# Patient Record
Sex: FEMALE | Race: OTHER | HISPANIC OR LATINO
[De-identification: names, ages, dates, MRNs, and addresses within clinical notes are randomized per-mention and may not be internally consistent; named-entity substitution may affect disease eponyms.]

---

## 2017-01-06 ENCOUNTER — APPOINTMENT (OUTPATIENT)
Dept: INTERNAL MEDICINE | Facility: CLINIC | Age: 72
End: 2017-01-06

## 2017-01-06 ENCOUNTER — OUTPATIENT (OUTPATIENT)
Dept: OUTPATIENT SERVICES | Facility: HOSPITAL | Age: 72
LOS: 1 days | End: 2017-01-06
Payer: MEDICAID

## 2017-01-06 VITALS
SYSTOLIC BLOOD PRESSURE: 120 MMHG | DIASTOLIC BLOOD PRESSURE: 70 MMHG | HEIGHT: 64 IN | WEIGHT: 133 LBS | BODY MASS INDEX: 22.71 KG/M2

## 2017-01-06 DIAGNOSIS — Z98.89 OTHER SPECIFIED POSTPROCEDURAL STATES: Chronic | ICD-10-CM

## 2017-01-06 DIAGNOSIS — I10 ESSENTIAL (PRIMARY) HYPERTENSION: ICD-10-CM

## 2017-01-06 DIAGNOSIS — Z41.1 ENCOUNTER FOR COSMETIC SURGERY: Chronic | ICD-10-CM

## 2017-01-06 DIAGNOSIS — Z98.42 CATARACT EXTRACTION STATUS, LEFT EYE: Chronic | ICD-10-CM

## 2017-01-06 DIAGNOSIS — R04.0 EPISTAXIS: ICD-10-CM

## 2017-01-06 PROCEDURE — G0463: CPT

## 2017-01-09 DIAGNOSIS — M25.561 PAIN IN RIGHT KNEE: ICD-10-CM

## 2017-01-09 DIAGNOSIS — Z87.39 PERSONAL HISTORY OF OTHER DISEASES OF THE MUSCULOSKELETAL SYSTEM AND CONNECTIVE TISSUE: ICD-10-CM

## 2017-01-09 DIAGNOSIS — L57.0 ACTINIC KERATOSIS: ICD-10-CM

## 2017-01-26 ENCOUNTER — FORM ENCOUNTER (OUTPATIENT)
Age: 72
End: 2017-01-26

## 2017-01-27 ENCOUNTER — APPOINTMENT (OUTPATIENT)
Dept: MAMMOGRAPHY | Facility: IMAGING CENTER | Age: 72
End: 2017-01-27

## 2017-01-27 ENCOUNTER — OUTPATIENT (OUTPATIENT)
Dept: OUTPATIENT SERVICES | Facility: HOSPITAL | Age: 72
LOS: 1 days | End: 2017-01-27
Payer: MEDICAID

## 2017-01-27 DIAGNOSIS — Z41.1 ENCOUNTER FOR COSMETIC SURGERY: Chronic | ICD-10-CM

## 2017-01-27 DIAGNOSIS — Z98.89 OTHER SPECIFIED POSTPROCEDURAL STATES: Chronic | ICD-10-CM

## 2017-01-27 DIAGNOSIS — Z12.31 ENCOUNTER FOR SCREENING MAMMOGRAM FOR MALIGNANT NEOPLASM OF BREAST: ICD-10-CM

## 2017-01-27 DIAGNOSIS — Z98.42 CATARACT EXTRACTION STATUS, LEFT EYE: Chronic | ICD-10-CM

## 2017-01-27 PROCEDURE — 77063 BREAST TOMOSYNTHESIS BI: CPT

## 2017-01-27 PROCEDURE — 77067 SCR MAMMO BI INCL CAD: CPT

## 2017-02-03 ENCOUNTER — OUTPATIENT (OUTPATIENT)
Dept: OUTPATIENT SERVICES | Facility: HOSPITAL | Age: 72
LOS: 1 days | End: 2017-02-03
Payer: MEDICAID

## 2017-02-03 ENCOUNTER — APPOINTMENT (OUTPATIENT)
Dept: RHEUMATOLOGY | Facility: HOSPITAL | Age: 72
End: 2017-02-03

## 2017-02-03 VITALS
RESPIRATION RATE: 16 BRPM | HEART RATE: 72 BPM | DIASTOLIC BLOOD PRESSURE: 74 MMHG | HEIGHT: 64 IN | WEIGHT: 129 LBS | SYSTOLIC BLOOD PRESSURE: 147 MMHG | BODY MASS INDEX: 22.02 KG/M2

## 2017-02-03 DIAGNOSIS — Z98.89 OTHER SPECIFIED POSTPROCEDURAL STATES: Chronic | ICD-10-CM

## 2017-02-03 DIAGNOSIS — M06.9 RHEUMATOID ARTHRITIS, UNSPECIFIED: ICD-10-CM

## 2017-02-03 DIAGNOSIS — Z98.42 CATARACT EXTRACTION STATUS, LEFT EYE: Chronic | ICD-10-CM

## 2017-02-03 DIAGNOSIS — Z41.1 ENCOUNTER FOR COSMETIC SURGERY: Chronic | ICD-10-CM

## 2017-02-03 PROCEDURE — G0463: CPT

## 2017-02-03 PROCEDURE — 20610 DRAIN/INJ JOINT/BURSA W/O US: CPT

## 2017-02-03 RX ORDER — LIDOCAINE HYDROCHLORIDE 10 MG/ML
1 INJECTION, SOLUTION INFILTRATION; PERINEURAL
Qty: 0 | Refills: 0 | Status: COMPLETED | OUTPATIENT
Start: 2017-02-03

## 2017-02-03 RX ORDER — METHYLPREDNISOLONE ACETATE 40 MG/ML
40 INJECTION, SUSPENSION INTRA-ARTICULAR; INTRALESIONAL; INTRAMUSCULAR; SOFT TISSUE
Qty: 1 | Refills: 0 | Status: COMPLETED | OUTPATIENT
Start: 2017-02-03

## 2017-02-09 ENCOUNTER — APPOINTMENT (OUTPATIENT)
Dept: OPHTHALMOLOGY | Facility: CLINIC | Age: 72
End: 2017-02-09

## 2017-02-10 DIAGNOSIS — M81.0 AGE-RELATED OSTEOPOROSIS WITHOUT CURRENT PATHOLOGICAL FRACTURE: ICD-10-CM

## 2017-02-10 DIAGNOSIS — M19.90 UNSPECIFIED OSTEOARTHRITIS, UNSPECIFIED SITE: ICD-10-CM

## 2017-02-10 DIAGNOSIS — M11.869 OTHER SPECIFIED CRYSTAL ARTHROPATHIES, UNSPECIFIED KNEE: ICD-10-CM

## 2017-02-13 ENCOUNTER — APPOINTMENT (OUTPATIENT)
Dept: OPHTHALMOLOGY | Facility: CLINIC | Age: 72
End: 2017-02-13

## 2017-04-16 ENCOUNTER — RESULT CHARGE (OUTPATIENT)
Age: 72
End: 2017-04-16

## 2017-04-17 ENCOUNTER — OUTPATIENT (OUTPATIENT)
Dept: OUTPATIENT SERVICES | Facility: HOSPITAL | Age: 72
LOS: 1 days | End: 2017-04-17
Payer: MEDICAID

## 2017-04-17 ENCOUNTER — NON-APPOINTMENT (OUTPATIENT)
Age: 72
End: 2017-04-17

## 2017-04-17 ENCOUNTER — APPOINTMENT (OUTPATIENT)
Dept: INTERNAL MEDICINE | Facility: CLINIC | Age: 72
End: 2017-04-17

## 2017-04-17 ENCOUNTER — LABORATORY RESULT (OUTPATIENT)
Age: 72
End: 2017-04-17

## 2017-04-17 DIAGNOSIS — Z98.89 OTHER SPECIFIED POSTPROCEDURAL STATES: Chronic | ICD-10-CM

## 2017-04-17 DIAGNOSIS — Z41.1 ENCOUNTER FOR COSMETIC SURGERY: Chronic | ICD-10-CM

## 2017-04-17 DIAGNOSIS — I10 ESSENTIAL (PRIMARY) HYPERTENSION: ICD-10-CM

## 2017-04-17 DIAGNOSIS — Z13.1 ENCOUNTER FOR SCREENING FOR DIABETES MELLITUS: ICD-10-CM

## 2017-04-17 DIAGNOSIS — Z98.42 CATARACT EXTRACTION STATUS, LEFT EYE: Chronic | ICD-10-CM

## 2017-04-17 PROCEDURE — 80061 LIPID PANEL: CPT

## 2017-04-17 PROCEDURE — G0463: CPT

## 2017-04-18 LAB
CHOLEST SERPL-MCNC: 140 MG/DL — SIGNIFICANT CHANGE UP (ref 10–199)
HDLC SERPL-MCNC: 56 MG/DL — SIGNIFICANT CHANGE UP (ref 40–125)
LIPID PNL WITH DIRECT LDL SERPL: 72 MG/DL — SIGNIFICANT CHANGE UP
TOTAL CHOLESTEROL/HDL RATIO MEASUREMENT: 2.5 RATIO — LOW (ref 3.3–7.1)
TRIGL SERPL-MCNC: 62 MG/DL — SIGNIFICANT CHANGE UP (ref 10–149)

## 2017-04-21 LAB
ANION GAP SERPL CALC-SCNC: 16 MMOL/L
BASOPHILS # BLD AUTO: 0.02 K/UL
BASOPHILS NFR BLD AUTO: 0.3 %
BUN SERPL-MCNC: 19 MG/DL
CALCIUM SERPL-MCNC: 10 MG/DL
CHLORIDE SERPL-SCNC: 98 MMOL/L
CO2 SERPL-SCNC: 25 MMOL/L
CREAT SERPL-MCNC: 0.73 MG/DL
EOSINOPHIL # BLD AUTO: 0.07 K/UL
EOSINOPHIL NFR BLD AUTO: 1.1 %
GLUCOSE SERPL-MCNC: 91 MG/DL
HBA1C MFR BLD HPLC: 5.8 %
HCT VFR BLD CALC: 41.5 %
HGB BLD-MCNC: 14 G/DL
IMM GRANULOCYTES NFR BLD AUTO: 0.2 %
LYMPHOCYTES # BLD AUTO: 2.09 K/UL
LYMPHOCYTES NFR BLD AUTO: 33.9 %
MAN DIFF?: NORMAL
MCHC RBC-ENTMCNC: 31.3 PG
MCHC RBC-ENTMCNC: 33.7 GM/DL
MCV RBC AUTO: 92.8 FL
MONOCYTES # BLD AUTO: 0.41 K/UL
MONOCYTES NFR BLD AUTO: 6.6 %
NEUTROPHILS # BLD AUTO: 3.57 K/UL
NEUTROPHILS NFR BLD AUTO: 57.9 %
PLATELET # BLD AUTO: 258 K/UL
POTASSIUM SERPL-SCNC: 3.6 MMOL/L
RBC # BLD: 4.47 M/UL
RBC # FLD: 13.6 %
SODIUM SERPL-SCNC: 139 MMOL/L
TSH SERPL-ACNC: 3.59 UIU/ML
WBC # FLD AUTO: 6.17 K/UL

## 2017-04-28 ENCOUNTER — APPOINTMENT (OUTPATIENT)
Dept: RHEUMATOLOGY | Facility: HOSPITAL | Age: 72
End: 2017-04-28

## 2017-05-01 ENCOUNTER — NON-APPOINTMENT (OUTPATIENT)
Age: 72
End: 2017-05-01

## 2017-05-02 ENCOUNTER — APPOINTMENT (OUTPATIENT)
Dept: INTERNAL MEDICINE | Facility: CLINIC | Age: 72
End: 2017-05-02

## 2017-05-02 DIAGNOSIS — L57.0 ACTINIC KERATOSIS: ICD-10-CM

## 2017-05-02 DIAGNOSIS — R10.9 UNSPECIFIED ABDOMINAL PAIN: ICD-10-CM

## 2017-05-16 ENCOUNTER — APPOINTMENT (OUTPATIENT)
Dept: INTERNAL MEDICINE | Facility: CLINIC | Age: 72
End: 2017-05-16

## 2017-05-16 ENCOUNTER — OUTPATIENT (OUTPATIENT)
Dept: OUTPATIENT SERVICES | Facility: HOSPITAL | Age: 72
LOS: 1 days | End: 2017-05-16
Payer: MEDICAID

## 2017-05-16 DIAGNOSIS — Z41.1 ENCOUNTER FOR COSMETIC SURGERY: Chronic | ICD-10-CM

## 2017-05-16 DIAGNOSIS — Z98.42 CATARACT EXTRACTION STATUS, LEFT EYE: Chronic | ICD-10-CM

## 2017-05-16 DIAGNOSIS — I10 ESSENTIAL (PRIMARY) HYPERTENSION: ICD-10-CM

## 2017-05-16 DIAGNOSIS — Z98.89 OTHER SPECIFIED POSTPROCEDURAL STATES: Chronic | ICD-10-CM

## 2017-05-16 PROCEDURE — G0463: CPT

## 2017-05-26 ENCOUNTER — APPOINTMENT (OUTPATIENT)
Dept: RHEUMATOLOGY | Facility: HOSPITAL | Age: 72
End: 2017-05-26

## 2017-05-26 ENCOUNTER — OUTPATIENT (OUTPATIENT)
Dept: OUTPATIENT SERVICES | Facility: HOSPITAL | Age: 72
LOS: 1 days | End: 2017-05-26
Payer: MEDICAID

## 2017-05-26 VITALS
BODY MASS INDEX: 22.2 KG/M2 | WEIGHT: 130 LBS | DIASTOLIC BLOOD PRESSURE: 72 MMHG | SYSTOLIC BLOOD PRESSURE: 136 MMHG | HEART RATE: 74 BPM | HEIGHT: 64 IN

## 2017-05-26 DIAGNOSIS — Z98.89 OTHER SPECIFIED POSTPROCEDURAL STATES: Chronic | ICD-10-CM

## 2017-05-26 DIAGNOSIS — M06.9 RHEUMATOID ARTHRITIS, UNSPECIFIED: ICD-10-CM

## 2017-05-26 DIAGNOSIS — M10.9 GOUT, UNSPECIFIED: ICD-10-CM

## 2017-05-26 DIAGNOSIS — Z98.42 CATARACT EXTRACTION STATUS, LEFT EYE: Chronic | ICD-10-CM

## 2017-05-26 DIAGNOSIS — Z41.1 ENCOUNTER FOR COSMETIC SURGERY: Chronic | ICD-10-CM

## 2017-05-26 LAB
ALBUMIN SERPL ELPH-MCNC: 4.5 G/DL — SIGNIFICANT CHANGE UP (ref 3.3–5)
ALP SERPL-CCNC: 79 U/L — SIGNIFICANT CHANGE UP (ref 40–120)
ALT FLD-CCNC: 29 U/L — SIGNIFICANT CHANGE UP (ref 10–45)
ANION GAP SERPL CALC-SCNC: 17 MMOL/L — SIGNIFICANT CHANGE UP (ref 5–17)
AST SERPL-CCNC: 33 U/L — SIGNIFICANT CHANGE UP (ref 10–40)
B PERT IGG+IGM PNL SER: CLEAR — SIGNIFICANT CHANGE UP
BASOPHILS # BLD AUTO: 0.07 K/UL — SIGNIFICANT CHANGE UP (ref 0–0.2)
BASOPHILS NFR BLD AUTO: 1.2 % — SIGNIFICANT CHANGE UP (ref 0–2)
BILIRUB SERPL-MCNC: 0.5 MG/DL — SIGNIFICANT CHANGE UP (ref 0.2–1.2)
BUN SERPL-MCNC: 18 MG/DL — SIGNIFICANT CHANGE UP (ref 7–23)
CALCIUM SERPL-MCNC: 10.2 MG/DL — SIGNIFICANT CHANGE UP (ref 8.4–10.5)
CHLORIDE SERPL-SCNC: 99 MMOL/L — SIGNIFICANT CHANGE UP (ref 96–108)
CO2 SERPL-SCNC: 25 MMOL/L — SIGNIFICANT CHANGE UP (ref 22–31)
COLOR FLD: SIGNIFICANT CHANGE UP
CREAT SERPL-MCNC: 0.8 MG/DL — SIGNIFICANT CHANGE UP (ref 0.5–1.3)
CRP SERPL-MCNC: <0.2 MG/DL — SIGNIFICANT CHANGE UP (ref 0–0.4)
CRYSTALS SNV QL MICRO: SIGNIFICANT CHANGE UP
EOSINOPHIL # BLD AUTO: 0.1 K/UL — SIGNIFICANT CHANGE UP (ref 0–0.5)
EOSINOPHIL NFR BLD AUTO: 1.7 % — SIGNIFICANT CHANGE UP (ref 0–6)
ERYTHROCYTE [SEDIMENTATION RATE] IN BLOOD: 23 MM/HR — HIGH (ref 0–20)
FLUID INTAKE SUBSTANCE CLASS: SIGNIFICANT CHANGE UP
FLUID SEGMENTED GRANULOCYTES: 3 % — SIGNIFICANT CHANGE UP
GLUCOSE SERPL-MCNC: 97 MG/DL — SIGNIFICANT CHANGE UP (ref 70–99)
GRAM STN FLD: SIGNIFICANT CHANGE UP
HCT VFR BLD CALC: 41.8 % — SIGNIFICANT CHANGE UP (ref 34.5–45)
HGB BLD-MCNC: 13.9 G/DL — SIGNIFICANT CHANGE UP (ref 11.5–15.5)
IMM GRANULOCYTES NFR BLD AUTO: 0.2 % — SIGNIFICANT CHANGE UP (ref 0–1.5)
LYMPHOCYTES # BLD AUTO: 1.79 K/UL — SIGNIFICANT CHANGE UP (ref 1–3.3)
LYMPHOCYTES # BLD AUTO: 30.1 % — SIGNIFICANT CHANGE UP (ref 13–44)
LYMPHOCYTES # FLD: 14 % — SIGNIFICANT CHANGE UP
MCHC RBC-ENTMCNC: 30.5 PG — SIGNIFICANT CHANGE UP (ref 27–34)
MCHC RBC-ENTMCNC: 33.3 GM/DL — SIGNIFICANT CHANGE UP (ref 32–36)
MCV RBC AUTO: 91.9 FL — SIGNIFICANT CHANGE UP (ref 80–100)
MESOTHL CELL # FLD: 10 % — SIGNIFICANT CHANGE UP
MONOCYTES # BLD AUTO: 0.5 K/UL — SIGNIFICANT CHANGE UP (ref 0–0.9)
MONOCYTES NFR BLD AUTO: 8.4 % — SIGNIFICANT CHANGE UP (ref 2–14)
MONOS+MACROS # FLD: 73 % — SIGNIFICANT CHANGE UP
NEUTROPHILS # BLD AUTO: 3.47 K/UL — SIGNIFICANT CHANGE UP (ref 1.8–7.4)
NEUTROPHILS NFR BLD AUTO: 58.4 % — SIGNIFICANT CHANGE UP (ref 43–77)
PLATELET # BLD AUTO: 308 K/UL — SIGNIFICANT CHANGE UP (ref 150–400)
POTASSIUM SERPL-MCNC: 4.1 MMOL/L — SIGNIFICANT CHANGE UP (ref 3.5–5.3)
POTASSIUM SERPL-SCNC: 4.1 MMOL/L — SIGNIFICANT CHANGE UP (ref 3.5–5.3)
PROT SERPL-MCNC: 7.6 G/DL — SIGNIFICANT CHANGE UP (ref 6–8.3)
RBC # BLD: 4.55 M/UL — SIGNIFICANT CHANGE UP (ref 3.8–5.2)
RBC # FLD: 13.4 % — SIGNIFICANT CHANGE UP (ref 10.3–14.5)
RCV VOL RI: 31 /UL — HIGH (ref 0–5)
SODIUM SERPL-SCNC: 141 MMOL/L — SIGNIFICANT CHANGE UP (ref 135–145)
SPECIMEN SOURCE: SIGNIFICANT CHANGE UP
TOTAL NUCLEATED CELL COUNT, BODY FLUID: 73 /UL — HIGH (ref 0–5)
TUBE TYPE: SIGNIFICANT CHANGE UP
URATE SERPL-MCNC: 3.9 MG/DL — SIGNIFICANT CHANGE UP (ref 2.5–7)
WBC # BLD: 5.94 K/UL — SIGNIFICANT CHANGE UP (ref 3.8–10.5)
WBC # FLD AUTO: 5.94 K/UL — SIGNIFICANT CHANGE UP (ref 3.8–10.5)

## 2017-05-26 PROCEDURE — G0463: CPT

## 2017-05-26 PROCEDURE — 85027 COMPLETE CBC AUTOMATED: CPT

## 2017-05-26 PROCEDURE — 85652 RBC SED RATE AUTOMATED: CPT

## 2017-05-26 PROCEDURE — 86140 C-REACTIVE PROTEIN: CPT

## 2017-05-26 PROCEDURE — 89060 EXAM SYNOVIAL FLUID CRYSTALS: CPT

## 2017-05-26 PROCEDURE — 87205 SMEAR GRAM STAIN: CPT

## 2017-05-26 PROCEDURE — 36415 COLL VENOUS BLD VENIPUNCTURE: CPT

## 2017-05-26 PROCEDURE — 87075 CULTR BACTERIA EXCEPT BLOOD: CPT

## 2017-05-26 PROCEDURE — 89051 BODY FLUID CELL COUNT: CPT

## 2017-05-26 PROCEDURE — 84550 ASSAY OF BLOOD/URIC ACID: CPT

## 2017-05-26 PROCEDURE — 87070 CULTURE OTHR SPECIMN AEROBIC: CPT

## 2017-05-26 PROCEDURE — 80053 COMPREHEN METABOLIC PANEL: CPT

## 2017-05-31 ENCOUNTER — APPOINTMENT (OUTPATIENT)
Dept: ELECTROPHYSIOLOGY | Facility: CLINIC | Age: 72
End: 2017-05-31

## 2017-05-31 ENCOUNTER — NON-APPOINTMENT (OUTPATIENT)
Age: 72
End: 2017-05-31

## 2017-05-31 VITALS — OXYGEN SATURATION: 98 % | SYSTOLIC BLOOD PRESSURE: 149 MMHG | HEART RATE: 62 BPM | DIASTOLIC BLOOD PRESSURE: 85 MMHG

## 2017-05-31 LAB
CULTURE RESULTS: SIGNIFICANT CHANGE UP
SPECIMEN SOURCE: SIGNIFICANT CHANGE UP

## 2017-06-02 DIAGNOSIS — M19.90 UNSPECIFIED OSTEOARTHRITIS, UNSPECIFIED SITE: ICD-10-CM

## 2017-06-02 DIAGNOSIS — M11.869 OTHER SPECIFIED CRYSTAL ARTHROPATHIES, UNSPECIFIED KNEE: ICD-10-CM

## 2017-06-21 ENCOUNTER — APPOINTMENT (OUTPATIENT)
Dept: ELECTROPHYSIOLOGY | Facility: CLINIC | Age: 72
End: 2017-06-21

## 2017-06-21 ENCOUNTER — NON-APPOINTMENT (OUTPATIENT)
Age: 72
End: 2017-06-21

## 2017-06-21 VITALS — SYSTOLIC BLOOD PRESSURE: 136 MMHG | DIASTOLIC BLOOD PRESSURE: 71 MMHG | HEART RATE: 62 BPM

## 2017-06-21 DIAGNOSIS — R00.2 PALPITATIONS: ICD-10-CM

## 2017-06-21 DIAGNOSIS — H26.9 UNSPECIFIED CATARACT: ICD-10-CM

## 2017-06-21 RX ORDER — SODIUM CHLORIDE 0.65 %
0.65 AEROSOL, SPRAY (ML) NASAL TWICE DAILY
Qty: 1 | Refills: 0 | Status: DISCONTINUED | COMMUNITY
Start: 2017-01-06 | End: 2017-06-21

## 2017-07-02 ENCOUNTER — FORM ENCOUNTER (OUTPATIENT)
Age: 72
End: 2017-07-02

## 2017-07-21 ENCOUNTER — OUTPATIENT (OUTPATIENT)
Dept: OUTPATIENT SERVICES | Facility: HOSPITAL | Age: 72
LOS: 1 days | End: 2017-07-21
Payer: MEDICAID

## 2017-07-21 ENCOUNTER — APPOINTMENT (OUTPATIENT)
Dept: RHEUMATOLOGY | Facility: HOSPITAL | Age: 72
End: 2017-07-21

## 2017-07-21 VITALS
BODY MASS INDEX: 22.36 KG/M2 | HEIGHT: 64 IN | HEART RATE: 76 BPM | SYSTOLIC BLOOD PRESSURE: 122 MMHG | WEIGHT: 131 LBS | DIASTOLIC BLOOD PRESSURE: 73 MMHG | RESPIRATION RATE: 14 BRPM

## 2017-07-21 DIAGNOSIS — Z98.42 CATARACT EXTRACTION STATUS, LEFT EYE: Chronic | ICD-10-CM

## 2017-07-21 DIAGNOSIS — M06.9 RHEUMATOID ARTHRITIS, UNSPECIFIED: ICD-10-CM

## 2017-07-21 DIAGNOSIS — M11.269 OTHER CHONDROCALCINOSIS, UNSPECIFIED KNEE: ICD-10-CM

## 2017-07-21 DIAGNOSIS — Z41.1 ENCOUNTER FOR COSMETIC SURGERY: Chronic | ICD-10-CM

## 2017-07-21 DIAGNOSIS — Z98.89 OTHER SPECIFIED POSTPROCEDURAL STATES: Chronic | ICD-10-CM

## 2017-07-21 LAB
B PERT IGG+IGM PNL SER: ABNORMAL
COLOR FLD: YELLOW — SIGNIFICANT CHANGE UP
CRYSTALS SNV QL MICRO: SIGNIFICANT CHANGE UP
FLUID INTAKE SUBSTANCE CLASS: SIGNIFICANT CHANGE UP
FLUID SEGMENTED GRANULOCYTES: 17 % — SIGNIFICANT CHANGE UP
FOLATE+VIT B12 SERBLD-IMP: 3 % — SIGNIFICANT CHANGE UP
LYMPHOCYTES # FLD: 29 % — SIGNIFICANT CHANGE UP
MESOTHL CELL # FLD: 11 % — SIGNIFICANT CHANGE UP
MONOS+MACROS # FLD: 40 % — SIGNIFICANT CHANGE UP
RCV VOL RI: 2050 /UL — HIGH (ref 0–5)
TOTAL NUCLEATED CELL COUNT, BODY FLUID: 240 /UL — HIGH (ref 0–5)
TUBE TYPE: SIGNIFICANT CHANGE UP

## 2017-07-21 PROCEDURE — 89060 EXAM SYNOVIAL FLUID CRYSTALS: CPT

## 2017-07-21 PROCEDURE — 89051 BODY FLUID CELL COUNT: CPT

## 2017-07-21 PROCEDURE — 20610 DRAIN/INJ JOINT/BURSA W/O US: CPT

## 2017-07-21 PROCEDURE — G0463: CPT

## 2017-07-24 DIAGNOSIS — M19.90 UNSPECIFIED OSTEOARTHRITIS, UNSPECIFIED SITE: ICD-10-CM

## 2017-07-26 ENCOUNTER — RX RENEWAL (OUTPATIENT)
Age: 72
End: 2017-07-26

## 2017-07-27 ENCOUNTER — FORM ENCOUNTER (OUTPATIENT)
Age: 72
End: 2017-07-27

## 2017-07-28 ENCOUNTER — OUTPATIENT (OUTPATIENT)
Dept: OUTPATIENT SERVICES | Facility: HOSPITAL | Age: 72
LOS: 1 days | End: 2017-07-28
Payer: MEDICAID

## 2017-07-28 DIAGNOSIS — M11.269 OTHER CHONDROCALCINOSIS, UNSPECIFIED KNEE: ICD-10-CM

## 2017-07-28 DIAGNOSIS — M25.569 PAIN IN UNSPECIFIED KNEE: ICD-10-CM

## 2017-07-28 DIAGNOSIS — Z98.89 OTHER SPECIFIED POSTPROCEDURAL STATES: Chronic | ICD-10-CM

## 2017-07-28 DIAGNOSIS — M19.90 UNSPECIFIED OSTEOARTHRITIS, UNSPECIFIED SITE: ICD-10-CM

## 2017-07-28 DIAGNOSIS — Z41.1 ENCOUNTER FOR COSMETIC SURGERY: Chronic | ICD-10-CM

## 2017-07-28 DIAGNOSIS — Z98.42 CATARACT EXTRACTION STATUS, LEFT EYE: Chronic | ICD-10-CM

## 2017-07-28 PROCEDURE — 73564 X-RAY EXAM KNEE 4 OR MORE: CPT

## 2017-07-28 PROCEDURE — 73521 X-RAY EXAM HIPS BI 2 VIEWS: CPT | Mod: 26

## 2017-07-28 PROCEDURE — 73521 X-RAY EXAM HIPS BI 2 VIEWS: CPT

## 2017-07-28 PROCEDURE — 73564 X-RAY EXAM KNEE 4 OR MORE: CPT | Mod: 26,50

## 2017-09-26 ENCOUNTER — RX RENEWAL (OUTPATIENT)
Age: 72
End: 2017-09-26

## 2017-09-29 ENCOUNTER — APPOINTMENT (OUTPATIENT)
Dept: RHEUMATOLOGY | Facility: HOSPITAL | Age: 72
End: 2017-09-29
Payer: MEDICAID

## 2017-09-29 ENCOUNTER — OUTPATIENT (OUTPATIENT)
Dept: OUTPATIENT SERVICES | Facility: HOSPITAL | Age: 72
LOS: 1 days | End: 2017-09-29
Payer: MEDICAID

## 2017-09-29 VITALS
HEART RATE: 74 BPM | DIASTOLIC BLOOD PRESSURE: 74 MMHG | SYSTOLIC BLOOD PRESSURE: 165 MMHG | HEIGHT: 64 IN | WEIGHT: 128 LBS | RESPIRATION RATE: 16 BRPM | BODY MASS INDEX: 21.85 KG/M2

## 2017-09-29 DIAGNOSIS — M19.90 UNSPECIFIED OSTEOARTHRITIS, UNSPECIFIED SITE: ICD-10-CM

## 2017-09-29 DIAGNOSIS — Z98.89 OTHER SPECIFIED POSTPROCEDURAL STATES: Chronic | ICD-10-CM

## 2017-09-29 DIAGNOSIS — M81.0 AGE-RELATED OSTEOPOROSIS WITHOUT CURRENT PATHOLOGICAL FRACTURE: ICD-10-CM

## 2017-09-29 DIAGNOSIS — M25.569 PAIN IN UNSPECIFIED KNEE: ICD-10-CM

## 2017-09-29 DIAGNOSIS — Z98.42 CATARACT EXTRACTION STATUS, LEFT EYE: Chronic | ICD-10-CM

## 2017-09-29 DIAGNOSIS — Z41.1 ENCOUNTER FOR COSMETIC SURGERY: Chronic | ICD-10-CM

## 2017-09-29 DIAGNOSIS — M06.9 RHEUMATOID ARTHRITIS, UNSPECIFIED: ICD-10-CM

## 2017-09-29 PROCEDURE — G0463: CPT

## 2017-09-29 PROCEDURE — 99214 OFFICE O/P EST MOD 30 MIN: CPT

## 2017-09-29 RX ORDER — ALLOPURINOL 300 MG/1
300 TABLET ORAL DAILY
Qty: 30 | Refills: 4 | Status: DISCONTINUED | COMMUNITY
Start: 2017-05-26 | End: 2017-09-29

## 2017-10-03 ENCOUNTER — MEDICATION RENEWAL (OUTPATIENT)
Age: 72
End: 2017-10-03

## 2017-10-13 ENCOUNTER — APPOINTMENT (OUTPATIENT)
Dept: INTERNAL MEDICINE | Facility: CLINIC | Age: 72
End: 2017-10-13
Payer: MEDICAID

## 2017-10-13 VITALS
HEIGHT: 64 IN | OXYGEN SATURATION: 97 % | HEART RATE: 65 BPM | BODY MASS INDEX: 21.51 KG/M2 | DIASTOLIC BLOOD PRESSURE: 60 MMHG | SYSTOLIC BLOOD PRESSURE: 120 MMHG | WEIGHT: 126 LBS

## 2017-10-13 DIAGNOSIS — M25.569 PAIN IN UNSPECIFIED KNEE: ICD-10-CM

## 2017-10-13 DIAGNOSIS — M11.269 OTHER CHONDROCALCINOSIS, UNSPECIFIED KNEE: ICD-10-CM

## 2017-10-13 PROCEDURE — 99214 OFFICE O/P EST MOD 30 MIN: CPT | Mod: GC

## 2017-10-13 RX ORDER — DICLOFENAC SODIUM 10 MG/G
1 GEL TOPICAL
Qty: 1 | Refills: 1 | Status: DISCONTINUED | COMMUNITY
Start: 2017-09-29 | End: 2017-10-13

## 2017-10-13 RX ORDER — COLCHICINE 0.6 MG/1
0.6 CAPSULE ORAL
Qty: 30 | Refills: 0 | Status: DISCONTINUED | COMMUNITY
Start: 2017-04-20 | End: 2017-10-13

## 2017-11-10 ENCOUNTER — OUTPATIENT (OUTPATIENT)
Dept: OUTPATIENT SERVICES | Facility: HOSPITAL | Age: 72
LOS: 1 days | End: 2017-11-10
Payer: MEDICARE

## 2017-11-10 ENCOUNTER — APPOINTMENT (OUTPATIENT)
Dept: RHEUMATOLOGY | Facility: HOSPITAL | Age: 72
End: 2017-11-10
Payer: MEDICARE

## 2017-11-10 VITALS
SYSTOLIC BLOOD PRESSURE: 159 MMHG | HEART RATE: 58 BPM | HEIGHT: 64 IN | WEIGHT: 124 LBS | BODY MASS INDEX: 21.17 KG/M2 | DIASTOLIC BLOOD PRESSURE: 71 MMHG

## 2017-11-10 DIAGNOSIS — Z98.89 OTHER SPECIFIED POSTPROCEDURAL STATES: Chronic | ICD-10-CM

## 2017-11-10 DIAGNOSIS — Z41.1 ENCOUNTER FOR COSMETIC SURGERY: Chronic | ICD-10-CM

## 2017-11-10 DIAGNOSIS — M06.9 RHEUMATOID ARTHRITIS, UNSPECIFIED: ICD-10-CM

## 2017-11-10 DIAGNOSIS — Z98.42 CATARACT EXTRACTION STATUS, LEFT EYE: Chronic | ICD-10-CM

## 2017-11-10 PROCEDURE — G0463: CPT

## 2017-11-10 PROCEDURE — 99213 OFFICE O/P EST LOW 20 MIN: CPT | Mod: GC

## 2017-11-13 ENCOUNTER — OUTPATIENT (OUTPATIENT)
Dept: OUTPATIENT SERVICES | Facility: HOSPITAL | Age: 72
LOS: 1 days | End: 2017-11-13
Payer: MEDICARE

## 2017-11-13 ENCOUNTER — APPOINTMENT (OUTPATIENT)
Dept: INTERNAL MEDICINE | Facility: CLINIC | Age: 72
End: 2017-11-13

## 2017-11-13 ENCOUNTER — APPOINTMENT (OUTPATIENT)
Dept: INTERNAL MEDICINE | Facility: CLINIC | Age: 72
End: 2017-11-13
Payer: MEDICARE

## 2017-11-13 VITALS
SYSTOLIC BLOOD PRESSURE: 120 MMHG | WEIGHT: 124 LBS | DIASTOLIC BLOOD PRESSURE: 60 MMHG | BODY MASS INDEX: 21.17 KG/M2 | HEIGHT: 64 IN

## 2017-11-13 DIAGNOSIS — M19.90 UNSPECIFIED OSTEOARTHRITIS, UNSPECIFIED SITE: ICD-10-CM

## 2017-11-13 DIAGNOSIS — Z41.1 ENCOUNTER FOR COSMETIC SURGERY: Chronic | ICD-10-CM

## 2017-11-13 DIAGNOSIS — M25.569 PAIN IN UNSPECIFIED KNEE: ICD-10-CM

## 2017-11-13 DIAGNOSIS — K11.7 DISTURBANCES OF SALIVARY SECRETION: ICD-10-CM

## 2017-11-13 DIAGNOSIS — Z98.42 CATARACT EXTRACTION STATUS, LEFT EYE: Chronic | ICD-10-CM

## 2017-11-13 DIAGNOSIS — I10 ESSENTIAL (PRIMARY) HYPERTENSION: ICD-10-CM

## 2017-11-13 DIAGNOSIS — Z98.89 OTHER SPECIFIED POSTPROCEDURAL STATES: Chronic | ICD-10-CM

## 2017-11-13 DIAGNOSIS — M11.269 OTHER CHONDROCALCINOSIS, UNSPECIFIED KNEE: ICD-10-CM

## 2017-11-13 PROCEDURE — 99213 OFFICE O/P EST LOW 20 MIN: CPT | Mod: GE

## 2017-11-13 PROCEDURE — G0463: CPT

## 2017-11-14 DIAGNOSIS — M11.269 OTHER CHONDROCALCINOSIS, UNSPECIFIED KNEE: ICD-10-CM

## 2017-11-14 DIAGNOSIS — M19.90 UNSPECIFIED OSTEOARTHRITIS, UNSPECIFIED SITE: ICD-10-CM

## 2017-12-08 ENCOUNTER — APPOINTMENT (OUTPATIENT)
Dept: INTERNAL MEDICINE | Facility: CLINIC | Age: 72
End: 2017-12-08

## 2017-12-08 ENCOUNTER — OUTPATIENT (OUTPATIENT)
Dept: OUTPATIENT SERVICES | Facility: HOSPITAL | Age: 72
LOS: 1 days | End: 2017-12-08
Payer: MEDICARE

## 2017-12-08 DIAGNOSIS — Z98.89 OTHER SPECIFIED POSTPROCEDURAL STATES: Chronic | ICD-10-CM

## 2017-12-08 DIAGNOSIS — Z41.1 ENCOUNTER FOR COSMETIC SURGERY: Chronic | ICD-10-CM

## 2017-12-08 DIAGNOSIS — Z98.42 CATARACT EXTRACTION STATUS, LEFT EYE: Chronic | ICD-10-CM

## 2017-12-08 DIAGNOSIS — I10 ESSENTIAL (PRIMARY) HYPERTENSION: ICD-10-CM

## 2017-12-08 PROCEDURE — G0463: CPT

## 2018-01-10 ENCOUNTER — APPOINTMENT (OUTPATIENT)
Dept: OPHTHALMOLOGY | Facility: CLINIC | Age: 73
End: 2018-01-10
Payer: MEDICARE

## 2018-01-10 DIAGNOSIS — Z12.31 ENCOUNTER FOR SCREENING MAMMOGRAM FOR MALIGNANT NEOPLASM OF BREAST: ICD-10-CM

## 2018-01-10 DIAGNOSIS — H35.3120 NONEXUDATIVE AGE-RELATED MACULAR DEGENERATION, LEFT EYE, STAGE UNSPECIFIED: ICD-10-CM

## 2018-01-10 PROCEDURE — 92134 CPTRZ OPH DX IMG PST SGM RTA: CPT

## 2018-01-10 PROCEDURE — 92014 COMPRE OPH EXAM EST PT 1/>: CPT

## 2018-01-28 ENCOUNTER — FORM ENCOUNTER (OUTPATIENT)
Age: 73
End: 2018-01-28

## 2018-01-29 ENCOUNTER — RX RENEWAL (OUTPATIENT)
Age: 73
End: 2018-01-29

## 2018-01-29 ENCOUNTER — APPOINTMENT (OUTPATIENT)
Dept: MAMMOGRAPHY | Facility: IMAGING CENTER | Age: 73
End: 2018-01-29
Payer: MEDICARE

## 2018-01-29 ENCOUNTER — OUTPATIENT (OUTPATIENT)
Dept: OUTPATIENT SERVICES | Facility: HOSPITAL | Age: 73
LOS: 1 days | End: 2018-01-29
Payer: MEDICARE

## 2018-01-29 DIAGNOSIS — Z98.89 OTHER SPECIFIED POSTPROCEDURAL STATES: Chronic | ICD-10-CM

## 2018-01-29 DIAGNOSIS — Z41.1 ENCOUNTER FOR COSMETIC SURGERY: Chronic | ICD-10-CM

## 2018-01-29 DIAGNOSIS — Z98.42 CATARACT EXTRACTION STATUS, LEFT EYE: Chronic | ICD-10-CM

## 2018-01-29 DIAGNOSIS — Z12.31 ENCOUNTER FOR SCREENING MAMMOGRAM FOR MALIGNANT NEOPLASM OF BREAST: ICD-10-CM

## 2018-01-29 PROCEDURE — 77063 BREAST TOMOSYNTHESIS BI: CPT | Mod: 26

## 2018-01-29 PROCEDURE — 77067 SCR MAMMO BI INCL CAD: CPT | Mod: 26

## 2018-01-29 PROCEDURE — 77063 BREAST TOMOSYNTHESIS BI: CPT

## 2018-01-29 PROCEDURE — 77067 SCR MAMMO BI INCL CAD: CPT

## 2018-02-01 ENCOUNTER — APPOINTMENT (OUTPATIENT)
Dept: NEUROLOGY | Facility: HOSPITAL | Age: 73
End: 2018-02-01

## 2018-02-01 ENCOUNTER — OUTPATIENT (OUTPATIENT)
Dept: OUTPATIENT SERVICES | Facility: HOSPITAL | Age: 73
LOS: 1 days | End: 2018-02-01
Payer: MEDICARE

## 2018-02-01 VITALS
DIASTOLIC BLOOD PRESSURE: 72 MMHG | BODY MASS INDEX: 21.17 KG/M2 | WEIGHT: 124 LBS | HEIGHT: 64 IN | SYSTOLIC BLOOD PRESSURE: 137 MMHG | RESPIRATION RATE: 14 BRPM | HEART RATE: 70 BPM

## 2018-02-01 DIAGNOSIS — R56.9 UNSPECIFIED CONVULSIONS: ICD-10-CM

## 2018-02-01 DIAGNOSIS — Z98.89 OTHER SPECIFIED POSTPROCEDURAL STATES: Chronic | ICD-10-CM

## 2018-02-01 DIAGNOSIS — Z41.1 ENCOUNTER FOR COSMETIC SURGERY: Chronic | ICD-10-CM

## 2018-02-01 DIAGNOSIS — R13.10 DYSPHAGIA, UNSPECIFIED: ICD-10-CM

## 2018-02-01 DIAGNOSIS — Z98.42 CATARACT EXTRACTION STATUS, LEFT EYE: Chronic | ICD-10-CM

## 2018-02-01 DIAGNOSIS — K11.7 DISTURBANCES OF SALIVARY SECRETION: ICD-10-CM

## 2018-02-01 PROCEDURE — G0463: CPT

## 2018-02-01 RX ORDER — COLCHICINE 0.6 MG/1
0.6 TABLET ORAL
Qty: 60 | Refills: 1 | Status: DISCONTINUED | COMMUNITY
Start: 2017-01-06 | End: 2018-02-01

## 2018-02-10 ENCOUNTER — OUTPATIENT (OUTPATIENT)
Dept: OUTPATIENT SERVICES | Facility: HOSPITAL | Age: 73
LOS: 1 days | End: 2018-02-10
Payer: MEDICARE

## 2018-02-10 ENCOUNTER — APPOINTMENT (OUTPATIENT)
Dept: MRI IMAGING | Facility: CLINIC | Age: 73
End: 2018-02-10
Payer: MEDICARE

## 2018-02-10 DIAGNOSIS — Z41.1 ENCOUNTER FOR COSMETIC SURGERY: Chronic | ICD-10-CM

## 2018-02-10 DIAGNOSIS — Z98.89 OTHER SPECIFIED POSTPROCEDURAL STATES: Chronic | ICD-10-CM

## 2018-02-10 DIAGNOSIS — R13.10 DYSPHAGIA, UNSPECIFIED: ICD-10-CM

## 2018-02-10 DIAGNOSIS — Z98.42 CATARACT EXTRACTION STATUS, LEFT EYE: Chronic | ICD-10-CM

## 2018-02-10 PROCEDURE — 70544 MR ANGIOGRAPHY HEAD W/O DYE: CPT

## 2018-02-10 PROCEDURE — 70544 MR ANGIOGRAPHY HEAD W/O DYE: CPT | Mod: 26,59

## 2018-02-10 PROCEDURE — 70551 MRI BRAIN STEM W/O DYE: CPT | Mod: 26

## 2018-02-10 PROCEDURE — 70551 MRI BRAIN STEM W/O DYE: CPT

## 2018-02-10 PROCEDURE — 70549 MR ANGIOGRAPH NECK W/O&W/DYE: CPT | Mod: 26

## 2018-02-10 PROCEDURE — 70549 MR ANGIOGRAPH NECK W/O&W/DYE: CPT

## 2018-02-10 PROCEDURE — 82565 ASSAY OF CREATININE: CPT

## 2018-02-10 PROCEDURE — A9585: CPT

## 2018-03-02 ENCOUNTER — OUTPATIENT (OUTPATIENT)
Dept: OUTPATIENT SERVICES | Facility: HOSPITAL | Age: 73
LOS: 1 days | Discharge: HOME | End: 2018-03-02

## 2018-03-02 ENCOUNTER — APPOINTMENT (OUTPATIENT)
Dept: SPEECH THERAPY | Facility: CLINIC | Age: 73
End: 2018-03-02

## 2018-03-02 DIAGNOSIS — Z41.1 ENCOUNTER FOR COSMETIC SURGERY: Chronic | ICD-10-CM

## 2018-03-02 DIAGNOSIS — Z98.89 OTHER SPECIFIED POSTPROCEDURAL STATES: Chronic | ICD-10-CM

## 2018-03-02 DIAGNOSIS — Z98.42 CATARACT EXTRACTION STATUS, LEFT EYE: Chronic | ICD-10-CM

## 2018-03-13 ENCOUNTER — OTHER (OUTPATIENT)
Age: 73
End: 2018-03-13

## 2018-03-21 DIAGNOSIS — R13.12 DYSPHAGIA, OROPHARYNGEAL PHASE: ICD-10-CM

## 2018-03-29 ENCOUNTER — FORM ENCOUNTER (OUTPATIENT)
Age: 73
End: 2018-03-29

## 2018-03-30 ENCOUNTER — OUTPATIENT (OUTPATIENT)
Dept: OUTPATIENT SERVICES | Facility: HOSPITAL | Age: 73
LOS: 1 days | End: 2018-03-30

## 2018-03-30 ENCOUNTER — APPOINTMENT (OUTPATIENT)
Dept: RADIOLOGY | Facility: HOSPITAL | Age: 73
End: 2018-03-30
Payer: MEDICARE

## 2018-03-30 ENCOUNTER — APPOINTMENT (OUTPATIENT)
Dept: SPEECH THERAPY | Facility: HOSPITAL | Age: 73
End: 2018-03-30
Payer: MEDICARE

## 2018-03-30 DIAGNOSIS — Z98.89 OTHER SPECIFIED POSTPROCEDURAL STATES: Chronic | ICD-10-CM

## 2018-03-30 DIAGNOSIS — R13.10 DYSPHAGIA, UNSPECIFIED: ICD-10-CM

## 2018-03-30 DIAGNOSIS — Z41.1 ENCOUNTER FOR COSMETIC SURGERY: Chronic | ICD-10-CM

## 2018-03-30 DIAGNOSIS — Z98.42 CATARACT EXTRACTION STATUS, LEFT EYE: Chronic | ICD-10-CM

## 2018-03-30 PROCEDURE — 74230 X-RAY XM SWLNG FUNCJ C+: CPT | Mod: 26

## 2018-04-02 DIAGNOSIS — R13.12 DYSPHAGIA, OROPHARYNGEAL PHASE: ICD-10-CM

## 2018-04-05 ENCOUNTER — APPOINTMENT (OUTPATIENT)
Dept: INTERNAL MEDICINE | Facility: CLINIC | Age: 73
End: 2018-04-05
Payer: MEDICAID

## 2018-04-05 ENCOUNTER — OUTPATIENT (OUTPATIENT)
Dept: OUTPATIENT SERVICES | Facility: HOSPITAL | Age: 73
LOS: 1 days | End: 2018-04-05
Payer: MEDICARE

## 2018-04-05 VITALS
HEART RATE: 71 BPM | DIASTOLIC BLOOD PRESSURE: 82 MMHG | HEIGHT: 64 IN | SYSTOLIC BLOOD PRESSURE: 130 MMHG | BODY MASS INDEX: 21.51 KG/M2 | WEIGHT: 126 LBS

## 2018-04-05 DIAGNOSIS — Z98.89 OTHER SPECIFIED POSTPROCEDURAL STATES: Chronic | ICD-10-CM

## 2018-04-05 DIAGNOSIS — I10 ESSENTIAL (PRIMARY) HYPERTENSION: ICD-10-CM

## 2018-04-05 DIAGNOSIS — K11.7 DISTURBANCES OF SALIVARY SECRETION: ICD-10-CM

## 2018-04-05 DIAGNOSIS — Z98.42 CATARACT EXTRACTION STATUS, LEFT EYE: Chronic | ICD-10-CM

## 2018-04-05 DIAGNOSIS — E78.5 HYPERLIPIDEMIA, UNSPECIFIED: ICD-10-CM

## 2018-04-05 DIAGNOSIS — Z41.1 ENCOUNTER FOR COSMETIC SURGERY: Chronic | ICD-10-CM

## 2018-04-05 PROCEDURE — G0463: CPT

## 2018-04-05 PROCEDURE — 99397 PER PM REEVAL EST PAT 65+ YR: CPT | Mod: GC

## 2018-04-06 ENCOUNTER — RESULT REVIEW (OUTPATIENT)
Age: 73
End: 2018-04-06

## 2018-04-06 LAB
ANION GAP SERPL CALC-SCNC: 12 MMOL/L
BASOPHILS # BLD AUTO: 0.04 K/UL
BASOPHILS NFR BLD AUTO: 0.6 %
BUN SERPL-MCNC: 14 MG/DL
CALCIUM SERPL-MCNC: 10 MG/DL
CHLORIDE SERPL-SCNC: 101 MMOL/L
CHOLEST SERPL-MCNC: 149 MG/DL
CHOLEST/HDLC SERPL: 2.5 RATIO
CO2 SERPL-SCNC: 26 MMOL/L
CREAT SERPL-MCNC: 0.74 MG/DL
EOSINOPHIL # BLD AUTO: 0.16 K/UL
EOSINOPHIL NFR BLD AUTO: 2.5 %
GLUCOSE SERPL-MCNC: 73 MG/DL
HBA1C MFR BLD HPLC: 5.7 %
HCT VFR BLD CALC: 41.5 %
HDLC SERPL-MCNC: 60 MG/DL
HGB BLD-MCNC: 14.4 G/DL
IMM GRANULOCYTES NFR BLD AUTO: 0 %
LDLC SERPL CALC-MCNC: 72 MG/DL
LYMPHOCYTES # BLD AUTO: 1.85 K/UL
LYMPHOCYTES NFR BLD AUTO: 29.1 %
MAN DIFF?: NORMAL
MCHC RBC-ENTMCNC: 31.9 PG
MCHC RBC-ENTMCNC: 34.7 GM/DL
MCV RBC AUTO: 92 FL
MONOCYTES # BLD AUTO: 0.44 K/UL
MONOCYTES NFR BLD AUTO: 6.9 %
NEUTROPHILS # BLD AUTO: 3.87 K/UL
NEUTROPHILS NFR BLD AUTO: 60.9 %
PLATELET # BLD AUTO: 293 K/UL
POTASSIUM SERPL-SCNC: 4 MMOL/L
RBC # BLD: 4.51 M/UL
RBC # FLD: 14 %
SODIUM SERPL-SCNC: 139 MMOL/L
TRIGL SERPL-MCNC: 83 MG/DL
WBC # FLD AUTO: 6.36 K/UL

## 2018-04-13 ENCOUNTER — APPOINTMENT (OUTPATIENT)
Dept: SPEECH THERAPY | Facility: CLINIC | Age: 73
End: 2018-04-13

## 2018-04-20 ENCOUNTER — APPOINTMENT (OUTPATIENT)
Dept: SPEECH THERAPY | Facility: CLINIC | Age: 73
End: 2018-04-20

## 2018-05-04 ENCOUNTER — APPOINTMENT (OUTPATIENT)
Dept: INTERNAL MEDICINE | Facility: CLINIC | Age: 73
End: 2018-05-04

## 2018-05-04 ENCOUNTER — OUTPATIENT (OUTPATIENT)
Dept: OUTPATIENT SERVICES | Facility: HOSPITAL | Age: 73
LOS: 1 days | End: 2018-05-04
Payer: MEDICARE

## 2018-05-04 DIAGNOSIS — I10 ESSENTIAL (PRIMARY) HYPERTENSION: ICD-10-CM

## 2018-05-04 DIAGNOSIS — Z98.42 CATARACT EXTRACTION STATUS, LEFT EYE: Chronic | ICD-10-CM

## 2018-05-04 DIAGNOSIS — Z98.89 OTHER SPECIFIED POSTPROCEDURAL STATES: Chronic | ICD-10-CM

## 2018-05-04 DIAGNOSIS — Z41.1 ENCOUNTER FOR COSMETIC SURGERY: Chronic | ICD-10-CM

## 2018-05-04 PROCEDURE — G0463: CPT

## 2018-05-08 ENCOUNTER — APPOINTMENT (OUTPATIENT)
Dept: SPEECH THERAPY | Facility: CLINIC | Age: 73
End: 2018-05-08

## 2018-05-25 ENCOUNTER — OUTPATIENT (OUTPATIENT)
Dept: OUTPATIENT SERVICES | Facility: HOSPITAL | Age: 73
LOS: 1 days | End: 2018-05-25
Payer: MEDICARE

## 2018-05-25 ENCOUNTER — APPOINTMENT (OUTPATIENT)
Dept: RHEUMATOLOGY | Facility: HOSPITAL | Age: 73
End: 2018-05-25
Payer: MEDICARE

## 2018-05-25 VITALS
SYSTOLIC BLOOD PRESSURE: 130 MMHG | DIASTOLIC BLOOD PRESSURE: 66 MMHG | HEIGHT: 64 IN | HEART RATE: 67 BPM | RESPIRATION RATE: 16 BRPM | BODY MASS INDEX: 21.68 KG/M2 | WEIGHT: 127 LBS

## 2018-05-25 DIAGNOSIS — Z98.89 OTHER SPECIFIED POSTPROCEDURAL STATES: Chronic | ICD-10-CM

## 2018-05-25 DIAGNOSIS — M21.41 FLAT FOOT [PES PLANUS] (ACQUIRED), RIGHT FOOT: ICD-10-CM

## 2018-05-25 DIAGNOSIS — M21.42 FLAT FOOT [PES PLANUS] (ACQUIRED), RIGHT FOOT: ICD-10-CM

## 2018-05-25 DIAGNOSIS — M10.9 GOUT, UNSPECIFIED: ICD-10-CM

## 2018-05-25 DIAGNOSIS — Z98.42 CATARACT EXTRACTION STATUS, LEFT EYE: Chronic | ICD-10-CM

## 2018-05-25 DIAGNOSIS — M06.9 RHEUMATOID ARTHRITIS, UNSPECIFIED: ICD-10-CM

## 2018-05-25 DIAGNOSIS — Z41.1 ENCOUNTER FOR COSMETIC SURGERY: Chronic | ICD-10-CM

## 2018-05-25 LAB
ALBUMIN SERPL ELPH-MCNC: 4.6 G/DL — SIGNIFICANT CHANGE UP (ref 3.3–5)
ALP SERPL-CCNC: 76 U/L — SIGNIFICANT CHANGE UP (ref 40–120)
ALT FLD-CCNC: 18 U/L — SIGNIFICANT CHANGE UP (ref 10–45)
ANION GAP SERPL CALC-SCNC: 13 MMOL/L — SIGNIFICANT CHANGE UP (ref 5–17)
AST SERPL-CCNC: 24 U/L — SIGNIFICANT CHANGE UP (ref 10–40)
BILIRUB SERPL-MCNC: 0.4 MG/DL — SIGNIFICANT CHANGE UP (ref 0.2–1.2)
BUN SERPL-MCNC: 14 MG/DL — SIGNIFICANT CHANGE UP (ref 7–23)
CALCIUM SERPL-MCNC: 9.9 MG/DL — SIGNIFICANT CHANGE UP (ref 8.4–10.5)
CHLORIDE SERPL-SCNC: 104 MMOL/L — SIGNIFICANT CHANGE UP (ref 96–108)
CO2 SERPL-SCNC: 26 MMOL/L — SIGNIFICANT CHANGE UP (ref 22–31)
CREAT SERPL-MCNC: 0.72 MG/DL — SIGNIFICANT CHANGE UP (ref 0.5–1.3)
GLUCOSE SERPL-MCNC: 92 MG/DL — SIGNIFICANT CHANGE UP (ref 70–99)
POTASSIUM SERPL-MCNC: 4.4 MMOL/L — SIGNIFICANT CHANGE UP (ref 3.5–5.3)
POTASSIUM SERPL-SCNC: 4.4 MMOL/L — SIGNIFICANT CHANGE UP (ref 3.5–5.3)
PROT SERPL-MCNC: 7.7 G/DL — SIGNIFICANT CHANGE UP (ref 6–8.3)
SODIUM SERPL-SCNC: 143 MMOL/L — SIGNIFICANT CHANGE UP (ref 135–145)
URATE SERPL-MCNC: 4.9 MG/DL — SIGNIFICANT CHANGE UP (ref 2.5–7)

## 2018-05-25 PROCEDURE — 99214 OFFICE O/P EST MOD 30 MIN: CPT | Mod: GC

## 2018-05-25 PROCEDURE — 84550 ASSAY OF BLOOD/URIC ACID: CPT

## 2018-05-25 PROCEDURE — 80053 COMPREHEN METABOLIC PANEL: CPT

## 2018-05-25 PROCEDURE — G0463: CPT

## 2018-06-07 ENCOUNTER — OUTPATIENT (OUTPATIENT)
Dept: OUTPATIENT SERVICES | Facility: HOSPITAL | Age: 73
LOS: 1 days | End: 2018-06-07
Payer: MEDICARE

## 2018-06-07 ENCOUNTER — APPOINTMENT (OUTPATIENT)
Dept: INTERNAL MEDICINE | Facility: CLINIC | Age: 73
End: 2018-06-07

## 2018-06-07 VITALS
HEIGHT: 64 IN | SYSTOLIC BLOOD PRESSURE: 140 MMHG | OXYGEN SATURATION: 97 % | WEIGHT: 125 LBS | DIASTOLIC BLOOD PRESSURE: 70 MMHG | BODY MASS INDEX: 21.34 KG/M2 | HEART RATE: 70 BPM

## 2018-06-07 DIAGNOSIS — R20.0 ANESTHESIA OF SKIN: ICD-10-CM

## 2018-06-07 DIAGNOSIS — Z41.1 ENCOUNTER FOR COSMETIC SURGERY: Chronic | ICD-10-CM

## 2018-06-07 DIAGNOSIS — I10 ESSENTIAL (PRIMARY) HYPERTENSION: ICD-10-CM

## 2018-06-07 DIAGNOSIS — Z98.89 OTHER SPECIFIED POSTPROCEDURAL STATES: Chronic | ICD-10-CM

## 2018-06-07 DIAGNOSIS — Z98.42 CATARACT EXTRACTION STATUS, LEFT EYE: Chronic | ICD-10-CM

## 2018-06-07 PROCEDURE — G0463: CPT

## 2018-06-08 NOTE — HISTORY OF PRESENT ILLNESS
[FreeTextEntry8] : Daughter would like to apply to be home care provider.  \par Patient has lived with daughter for the past 16 yr.  Physical condition has been declining - joint pain r/t gout and osteoarthritis and poor balance also makes ambulation difficult.  \par Patient lives on lower level of daughter's home.  Has a tub chair.  Daughter cooks and does shopping, brings meals down to her as patient cannot do stairs without considerable difficulty.  Has fallen in the past.  Tile floor, one area rug, uses walker. \par Patient takes medication on her own.  Requires help with dressing, toileting, bathing.\par Pain in hands, shoulders, knees, hip, low back pain, ankles.  Joint swelling on and off.  \par Stopped gout prophylaxis about 5-6 mo ago.  Had been taking x 1-1.5 yr.  Labs recently draw at rheum visit, uric acid checked, results pending.  \par Plans to start PT.  Diclofenac gel recently approved.  Hesitant to take Naproxen d/t side effects.  \bassam Has noticed numbness from her hip down the back of her leg to the bottom of her third tow on the left.  No pain, no rash, no increased in weakness.  Chronic low back pain.  \par Has scheduled bone density scheduled for 6/20.   Needs requisition for colo and left UE ultrasound.  \par At end of visit patient mentioned she has felt a nodule on the right side of her neck x 2 days, slightly painful.  Denies any fever/chills, ear pain, congestion, sore throat. \par

## 2018-06-08 NOTE — REVIEW OF SYSTEMS
[Joint Pain] : joint pain [Joint Stiffness] : joint stiffness [Joint Swelling] : joint swelling [Muscle Weakness] : muscle weakness [Back Pain] : back pain [Negative] : Psychiatric [FreeTextEntry4] : right neck nodule [de-identified] : left posterior leg numbness

## 2018-06-08 NOTE — PLAN
[FreeTextEntry1] : \par #1 Physical disability, chronic pain\par Continues follow up for rheum.  Currently off of gout prophylaxis, has blood drawn recently, but results not in chart, will call lab, may not have been entered into [employer].\par Will start Diclofenac gel.\par Discussed PRN use of Naproxen with food, can also take with Tylenol for pain control.  \par Daughter assisting with home care, will complete paperwork.  \par Advised daughter can apply for [employer] sticker for when taking patient to appointments or shopping, bring form in to be completed.\par PT referral provided by rheum.\par Bone density pending.\par \par #2 Right posterior cervical lymph node\par Likely reactive\par Warm compresses, avoid manipulation\par Call/RTO if worsening pain, increased size and new symptoms or additional lymph nodes noticed\par Will follow up at scheduled appointment in 2 weeks with Dr. Shaffer\par \par #3 Left leg numbness\par Likely lumbar radiculopathy\par Will get EMG\par Consider ortho eval of low back pain/scoliosis\par

## 2018-06-08 NOTE — PHYSICAL EXAM
[No Acute Distress] : no acute distress [Well-Appearing] : well-appearing [Normal Outer Ear/Nose] : the outer ears and nose were normal in appearance [Normal Oropharynx] : the oropharynx was normal [Normal TMs] : both tympanic membranes were normal [No JVD] : no jugular venous distention [Supple] : supple [Thyroid Normal, No Nodules] : the thyroid was normal and there were no nodules present [No Respiratory Distress] : no respiratory distress  [Clear to Auscultation] : lungs were clear to auscultation bilaterally [No Accessory Muscle Use] : no accessory muscle use [Normal Rate] : normal rate  [Regular Rhythm] : with a regular rhythm [Normal S1, S2] : normal S1 and S2 [No Murmur] : no murmur heard [Pedal Pulses Present] : the pedal pulses are present [Soft] : abdomen soft [Non Tender] : non-tender [Non-distended] : non-distended [Normal Supraclavicular Nodes] : no supraclavicular lymphadenopathy [Normal Anterior Cervical Nodes] : no anterior cervical lymphadenopathy [No CVA Tenderness] : no CVA  tenderness [Scoliosis] : scoliosis [Motor Strength Upper Extremities Bilaterally] : there was weakness in both upper extremities [Motor Strength Lower Extremities Bilaterally] : there was weakness in both lower extremities [None] : no muscle rigidity was observed [No Rash] : no rash [Normal Gait] : normal gait [Coordination Grossly Intact] : coordination grossly intact [No Focal Deficits] : no focal deficits [de-identified] : mild left ankle swelling, no edema through lower leg [de-identified] : right posterior cervical lymph node, 1-2cm, tender [de-identified] : lumbar paraspinal tenderness [de-identified] : numbness through posterior left leg and bottom of left 3rd toe, weakened strength bilaterally

## 2018-06-13 ENCOUNTER — APPOINTMENT (OUTPATIENT)
Dept: INTERNAL MEDICINE | Facility: CLINIC | Age: 73
End: 2018-06-13
Payer: MEDICARE

## 2018-06-13 ENCOUNTER — OTHER (OUTPATIENT)
Age: 73
End: 2018-06-13

## 2018-06-13 DIAGNOSIS — M11.20 OTHER CHONDROCALCINOSIS, UNSPECIFIED SITE: ICD-10-CM

## 2018-06-13 PROCEDURE — 99214 OFFICE O/P EST MOD 30 MIN: CPT | Mod: GC

## 2018-06-15 DIAGNOSIS — R20.0 ANESTHESIA OF SKIN: ICD-10-CM

## 2018-06-17 ENCOUNTER — FORM ENCOUNTER (OUTPATIENT)
Age: 73
End: 2018-06-17

## 2018-06-18 ENCOUNTER — OUTPATIENT (OUTPATIENT)
Dept: OUTPATIENT SERVICES | Facility: HOSPITAL | Age: 73
LOS: 1 days | End: 2018-06-18
Payer: MEDICARE

## 2018-06-18 ENCOUNTER — APPOINTMENT (OUTPATIENT)
Dept: ULTRASOUND IMAGING | Facility: CLINIC | Age: 73
End: 2018-06-18
Payer: MEDICARE

## 2018-06-18 DIAGNOSIS — Z98.89 OTHER SPECIFIED POSTPROCEDURAL STATES: Chronic | ICD-10-CM

## 2018-06-18 DIAGNOSIS — Z41.1 ENCOUNTER FOR COSMETIC SURGERY: Chronic | ICD-10-CM

## 2018-06-18 DIAGNOSIS — M79.603 PAIN IN ARM, UNSPECIFIED: ICD-10-CM

## 2018-06-18 DIAGNOSIS — Z98.42 CATARACT EXTRACTION STATUS, LEFT EYE: Chronic | ICD-10-CM

## 2018-06-18 PROCEDURE — 93971 EXTREMITY STUDY: CPT

## 2018-06-18 PROCEDURE — 93971 EXTREMITY STUDY: CPT | Mod: 26

## 2018-06-19 ENCOUNTER — FORM ENCOUNTER (OUTPATIENT)
Age: 73
End: 2018-06-19

## 2018-06-19 NOTE — REVIEW OF SYSTEMS
[Joint Pain] : joint pain [Joint Stiffness] : joint stiffness [Joint Swelling] : joint swelling [Fever] : no fever [Chills] : no chills [Pain] : no pain [Itching] : no itching [Earache] : no earache [Sore Throat] : no sore throat [Chest Pain] : no chest pain [Palpitations] : no palpitations [Shortness Of Breath] : no shortness of breath [Wheezing] : no wheezing [Abdominal Pain] : no abdominal pain [Nausea] : no nausea [Vomiting] : no vomiting [Skin Rash] : no skin rash [FreeTextEntry9] : L antecubital pain

## 2018-06-19 NOTE — END OF VISIT
[] : Resident [FreeTextEntry3] : no inflammatory arthritis on exam today. restart colchicine for cppd. forms filled out.

## 2018-06-19 NOTE — ASSESSMENT
[FreeTextEntry1] : Pt is a 72yoF with PMH of CPPD, htn, hLd, actinic keratoses, seborrheic keratosis, osteopenia (on bisphosphonate vacation) who presents for CPE.\par \par 1. Htn: BP borderline today, likely in the setting of pain. C/w felodipine at current dose. \par \par 2. L antecubital pain: No evidence of swelling, palpable induration to suggest presence of DVT or thrombophlebitis. US was ordered at last visit; will follow up results.  \par \par 4. CPPD: Pt appears to have CPPD flare. Last uric acid level was WNL. \par Will restart colchicine 0.6mg qd x 2 weeks. Will hold naproxen since patient is averse to taking it. Will continue diclofenac gel. \par Shower chair prescription provided (written) so that patient can have it filled at a medical supply store of her choice.\par \par 5. 3rd toe numbness: Pt has pain without tenderness. Suspect likely Delgado's neuroma vs metatarsalgia. Advised patient to wear shoes with wider toe box. Podiatry referral provided. Patient denies symptoms suggestive of sciatica (no radiating pain up the lower extremity to the hip). \par \par HCM: \par TDap, pneumonia vaccine, zoster, DEXA, mammogram\par repeat colonoscopy due 2018 \par DEXA referral provided

## 2018-06-19 NOTE — HISTORY OF PRESENT ILLNESS
[de-identified] : Pt is a 72yoF with PMH of CPPD, htn, hLd, actinic keratoses, seborrheic keratosis, osteopenia (on bisphosphonate vacation) who presents for CPE.\par \par 1. Htn: /70 today. Patient has been taking felodipine. \par \par 2. L antecubital pain: Pt complains that L antecubital tightness has not changed. She denies hx of trauma to that area, changes in skin, focal pain or swelling.\par \par 4. CPPD: Pt complains that her bilateral knee pain has returned and is debilitating. She has been ambulating with a walker. She complains of bilateral knee instability and weakness. Pt has been using diclofenac gel at night with mild improvement of her symptoms. She is afraid to take naproxen because of potential kidney injury (she had a friend who developed renal failure in the setting of naproxen use).  She complains of bilateral knee swelling, but no erythema. \par Pt requests shower chair for ease in bathing\par \par 5. 3rd toe numbness: Pt endorses 3rd toe numbness bilaterally. She states that she has full sensation in other toes. She denies pain at the feet. She has a mild "pulling" sensation that extends along the posterior aspect of the LLE but denies any numbness or weakness at the calves or thighs. \par \par HCM: \par TDap, pneumonia vaccine, zoster, DEXA, mammogram\par repeat colonoscopy due 2018

## 2018-06-19 NOTE — PHYSICAL EXAM
[PERRL] : pupils equal round and reactive to light [EOMI] : extraocular movements intact [Normal Oropharynx] : the oropharynx was normal [Supple] : supple [No Lymphadenopathy] : no lymphadenopathy [Clear to Auscultation] : lungs were clear to auscultation bilaterally [No Accessory Muscle Use] : no accessory muscle use [Normal Rate] : normal rate  [Regular Rhythm] : with a regular rhythm [Normal S1, S2] : normal S1 and S2 [No Murmur] : no murmur heard [Soft] : abdomen soft [Non Tender] : non-tender [Normal Supraclavicular Nodes] : no supraclavicular lymphadenopathy [Normal Posterior Cervical Nodes] : no posterior cervical lymphadenopathy [Normal Anterior Cervical Nodes] : no anterior cervical lymphadenopathy [de-identified] : appears uncomfortable [de-identified] : bilateral knee tenderness to palpation, no palpable effusion, crepitus on exam, pain elicited with valgus and varus stress. varicose veins palpable posteriorly. L antecubital tenderness to palpation without induration or overlying skin changes, gait impaired by pain [de-identified] : g

## 2018-06-20 ENCOUNTER — OUTPATIENT (OUTPATIENT)
Dept: OUTPATIENT SERVICES | Facility: HOSPITAL | Age: 73
LOS: 1 days | End: 2018-06-20
Payer: MEDICARE

## 2018-06-20 ENCOUNTER — APPOINTMENT (OUTPATIENT)
Dept: RADIOLOGY | Facility: IMAGING CENTER | Age: 73
End: 2018-06-20
Payer: MEDICARE

## 2018-06-20 DIAGNOSIS — Z98.89 OTHER SPECIFIED POSTPROCEDURAL STATES: Chronic | ICD-10-CM

## 2018-06-20 DIAGNOSIS — Z98.42 CATARACT EXTRACTION STATUS, LEFT EYE: Chronic | ICD-10-CM

## 2018-06-20 DIAGNOSIS — M10.9 GOUT, UNSPECIFIED: ICD-10-CM

## 2018-06-20 DIAGNOSIS — Z41.1 ENCOUNTER FOR COSMETIC SURGERY: Chronic | ICD-10-CM

## 2018-06-20 PROCEDURE — 77080 DXA BONE DENSITY AXIAL: CPT | Mod: 26

## 2018-06-20 PROCEDURE — 77080 DXA BONE DENSITY AXIAL: CPT

## 2018-07-18 ENCOUNTER — APPOINTMENT (OUTPATIENT)
Dept: NEUROLOGY | Facility: CLINIC | Age: 73
End: 2018-07-18
Payer: MEDICARE

## 2018-07-18 PROCEDURE — 95910 NRV CNDJ TEST 7-8 STUDIES: CPT

## 2018-07-18 PROCEDURE — 95886 MUSC TEST DONE W/N TEST COMP: CPT

## 2018-07-20 ENCOUNTER — OUTPATIENT (OUTPATIENT)
Dept: OUTPATIENT SERVICES | Facility: HOSPITAL | Age: 73
LOS: 1 days | End: 2018-07-20
Payer: MEDICARE

## 2018-07-20 ENCOUNTER — APPOINTMENT (OUTPATIENT)
Dept: PODIATRY | Facility: HOSPITAL | Age: 73
End: 2018-07-20

## 2018-07-20 VITALS
BODY MASS INDEX: 21.34 KG/M2 | WEIGHT: 125 LBS | SYSTOLIC BLOOD PRESSURE: 120 MMHG | HEIGHT: 64 IN | DIASTOLIC BLOOD PRESSURE: 64 MMHG | HEART RATE: 76 BPM

## 2018-07-20 DIAGNOSIS — Z98.89 OTHER SPECIFIED POSTPROCEDURAL STATES: Chronic | ICD-10-CM

## 2018-07-20 DIAGNOSIS — Z41.1 ENCOUNTER FOR COSMETIC SURGERY: Chronic | ICD-10-CM

## 2018-07-20 DIAGNOSIS — Z98.42 CATARACT EXTRACTION STATUS, LEFT EYE: Chronic | ICD-10-CM

## 2018-07-20 DIAGNOSIS — E10.9 TYPE 1 DIABETES MELLITUS WITHOUT COMPLICATIONS: ICD-10-CM

## 2018-07-20 DIAGNOSIS — R20.0 ANESTHESIA OF SKIN: ICD-10-CM

## 2018-07-20 PROCEDURE — G0463: CPT

## 2018-07-27 ENCOUNTER — OUTPATIENT (OUTPATIENT)
Dept: OUTPATIENT SERVICES | Facility: HOSPITAL | Age: 73
LOS: 1 days | End: 2018-07-27
Payer: MEDICARE

## 2018-07-27 ENCOUNTER — APPOINTMENT (OUTPATIENT)
Dept: INTERNAL MEDICINE | Facility: CLINIC | Age: 73
End: 2018-07-27
Payer: MEDICARE

## 2018-07-27 VITALS — DIASTOLIC BLOOD PRESSURE: 67 MMHG | OXYGEN SATURATION: 98 % | SYSTOLIC BLOOD PRESSURE: 120 MMHG | HEART RATE: 74 BPM

## 2018-07-27 DIAGNOSIS — M81.0 AGE-RELATED OSTEOPOROSIS W/OUT CURRENT PATHOLOGICAL FRACTURE: ICD-10-CM

## 2018-07-27 DIAGNOSIS — I10 ESSENTIAL (PRIMARY) HYPERTENSION: ICD-10-CM

## 2018-07-27 DIAGNOSIS — Z98.89 OTHER SPECIFIED POSTPROCEDURAL STATES: Chronic | ICD-10-CM

## 2018-07-27 DIAGNOSIS — Z98.42 CATARACT EXTRACTION STATUS, LEFT EYE: Chronic | ICD-10-CM

## 2018-07-27 DIAGNOSIS — H61.22 IMPACTED CERUMEN, LEFT EAR: ICD-10-CM

## 2018-07-27 DIAGNOSIS — Z41.1 ENCOUNTER FOR COSMETIC SURGERY: Chronic | ICD-10-CM

## 2018-07-27 PROCEDURE — G0463: CPT

## 2018-07-27 PROCEDURE — 99213 OFFICE O/P EST LOW 20 MIN: CPT | Mod: GE

## 2018-07-27 RX ORDER — NAPROXEN 500 MG/1
500 TABLET ORAL
Qty: 60 | Refills: 2 | Status: DISCONTINUED | COMMUNITY
Start: 2017-10-13 | End: 2018-07-27

## 2018-07-27 RX ORDER — WALKER
EACH MISCELLANEOUS
Qty: 1 | Refills: 0 | Status: ACTIVE | COMMUNITY
Start: 2018-07-27 | End: 1900-01-01

## 2018-07-27 RX ORDER — DULOXETINE HYDROCHLORIDE 20 MG/1
20 CAPSULE, DELAYED RELEASE PELLETS ORAL
Qty: 30 | Refills: 3 | Status: DISCONTINUED | COMMUNITY
Start: 2018-05-25 | End: 2018-07-27

## 2018-07-30 NOTE — HISTORY OF PRESENT ILLNESS
[FreeTextEntry1] : dizziness in AM [de-identified] : 72yoF with PMH of CPPD, htn, hLd, actinic keratoses, seborrheic keratosis, osteoporosis who presents for follow up\par \par #dizziness - reports worse in AM after taking medications. Does not occur any other time of the day. She does not typically eat breakfast, will only have full lunch and some snacks later on the day. Denies room spinning or worse with head movements. Denies n/v, headaches, chest pain, dyspnea, dysuria, abd pain.\par \par #instability 2/2 knee pain 2/2 OA and CPPD - Reports chronic knee b/l knee pain causing difficulty ambulating and causing falls. Reports last fall was 2 months ago, and she landed on her bed, gradually. She is currently using a walker from a friend. Hesistant to take naproxen as she had a friend  from renal failure and used naproxen. Currently using colchicine as rx'd by rheum with some improvement in pain. \par \par #toe numbness - reports persistent b/l 3rd toe numbness. Was seen by neuro and podiatry. Podiatry believes it originates from back. Neuro recently performed EMG which showed neuropathy. \par \par #osteoporosis - found to have osteoporosis on most recent dexa. Was started on alendronate by rheum. \par \par HCM -\par -Mammogram  2018 \par -Colonoscopy ordered 2018 \par - Pap/hpv - 10/2013 neg\par vaccines - consider pneumovax booster

## 2018-07-30 NOTE — ASSESSMENT
[FreeTextEntry1] : 72yoF with PMH of CPPD, htn, hLd, actinic keratoses, seborrheic keratosis, osteoporosis who presents for follow up\par \par #dizziness -likely 2/2 polypharmacy in the setting of poor nutrition vs orthostatic hypotension. Previous MRA showed L iCA narrowing.\par - recommended taking medications with food and small meals throughout day with atleast 5-6 small snacks\par - recommended adequate hydration with 6 cups of water throughout day\par - f/u with Neuro in regards to iCA stenosis\par \par #instability 2/2 knee pain 2/2 OA and CPPD - improved with colchicine. \par - cont colchicine until next rheum appt in 2 weeks\par - recommended raising legs at night\par - sent walker rx, and shower seat, and toilet support to [facility]\par \par #toe numbness likely 2/2 to neuropathy.\par - consider starting gabapentin if worsens\par -f/u with neuro\par \par #osteoporosis- cont alendronate.\par - discussed with daughter importance of safety around house for fall prevention\par \par HCM -\par -Mammogram  1/2018 \par -Colonoscopy ordered 6/2018 \par - Pap/hpv - 10/2013 neg\par vaccines - consider pneumovax boosterat next visit\par \par \par f/u in 10 wks\par d/w Dr Scott

## 2018-07-30 NOTE — REVIEW OF SYSTEMS
[Joint Pain] : joint pain [Muscle Weakness] : muscle weakness [Dizziness] : dizziness [Fever] : no fever [Chills] : no chills [Night Sweats] : no night sweats [Vision Problems] : no vision problems [Chest Pain] : no chest pain [Palpitations] : no palpitations [Lower Ext Edema] : no lower extremity edema [Shortness Of Breath] : no shortness of breath [Cough] : no cough [Dyspnea on Exertion] : no dyspnea on exertion [Abdominal Pain] : no abdominal pain [Nausea] : no nausea [Diarrhea] : diarrhea [Vomiting] : no vomiting [Dysuria] : no dysuria [Frequency] : no frequency [Headache] : no headache [Memory Loss] : no memory loss

## 2018-07-30 NOTE — HISTORY OF PRESENT ILLNESS
[FreeTextEntry1] : dizziness in AM [de-identified] : 72yoF with PMH of CPPD, htn, hLd, actinic keratoses, seborrheic keratosis, osteoporosis who presents for follow up\par \par #dizziness - reports worse in AM after taking medications. Does not occur any other time of the day. She does not typically eat breakfast, will only have full lunch and some snacks later on the day. Denies room spinning or worse with head movements. Denies n/v, headaches, chest pain, dyspnea, dysuria, abd pain.\par \par #instability 2/2 knee pain 2/2 OA and CPPD - Reports chronic knee b/l knee pain causing difficulty ambulating and causing falls. Reports last fall was 2 months ago, and she landed on her bed, gradually. She is currently using a walker from a friend. Hesistant to take naproxen as she had a friend  from renal failure and used naproxen. Currently using colchicine as rx'd by rheum with some improvement in pain. \par \par #toe numbness - reports persistent b/l 3rd toe numbness. Was seen by neuro and podiatry. Podiatry believes it originates from back. Neuro recently performed EMG which showed neuropathy. \par \par #osteoporosis - found to have osteoporosis on most recent dexa. Was started on alendronate by rheum. \par \par HCM -\par -Mammogram  2018 \par -Colonoscopy ordered 2018 \par - Pap/hpv - 10/2013 neg\par vaccines - consider pneumovax booster

## 2018-07-30 NOTE — PHYSICAL EXAM
[No Acute Distress] : no acute distress [Well Nourished] : well nourished [Well Developed] : well developed [Well-Appearing] : well-appearing [Normal Sclera/Conjunctiva] : normal sclera/conjunctiva [PERRL] : pupils equal round and reactive to light [EOMI] : extraocular movements intact [Normal Outer Ear/Nose] : the outer ears and nose were normal in appearance [Normal Oropharynx] : the oropharynx was normal [No JVD] : no jugular venous distention [Supple] : supple [No Lymphadenopathy] : no lymphadenopathy [No Respiratory Distress] : no respiratory distress  [Clear to Auscultation] : lungs were clear to auscultation bilaterally [No Accessory Muscle Use] : no accessory muscle use [Normal Rate] : normal rate  [Regular Rhythm] : with a regular rhythm [Normal S1, S2] : normal S1 and S2 [No Murmur] : no murmur heard [Pedal Pulses Present] : the pedal pulses are present [No Edema] : there was no peripheral edema [No Extremity Clubbing/Cyanosis] : no extremity clubbing/cyanosis [Soft] : abdomen soft [Non Tender] : non-tender [Non-distended] : non-distended [No Masses] : no abdominal mass palpated [No HSM] : no HSM [Normal Bowel Sounds] : normal bowel sounds [Normal Supraclavicular Nodes] : no supraclavicular lymphadenopathy [Normal Posterior Cervical Nodes] : no posterior cervical lymphadenopathy [Normal Anterior Cervical Nodes] : no anterior cervical lymphadenopathy [No CVA Tenderness] : no CVA  tenderness [No Spinal Tenderness] : no spinal tenderness [Grossly Normal Strength/Tone] : grossly normal strength/tone [No Rash] : no rash [Normal Gait] : normal gait [Coordination Grossly Intact] : coordination grossly intact [No Focal Deficits] : no focal deficits [Normal Affect] : the affect was normal [Normal Insight/Judgement] : insight and judgment were intact [de-identified] : Pain with knee flexion, pain with hip internal and external rotation

## 2018-07-30 NOTE — PHYSICAL EXAM
[No Acute Distress] : no acute distress [Well Nourished] : well nourished [Well Developed] : well developed [Well-Appearing] : well-appearing [Normal Sclera/Conjunctiva] : normal sclera/conjunctiva [PERRL] : pupils equal round and reactive to light [EOMI] : extraocular movements intact [Normal Outer Ear/Nose] : the outer ears and nose were normal in appearance [Normal Oropharynx] : the oropharynx was normal [No JVD] : no jugular venous distention [Supple] : supple [No Lymphadenopathy] : no lymphadenopathy [No Respiratory Distress] : no respiratory distress  [Clear to Auscultation] : lungs were clear to auscultation bilaterally [No Accessory Muscle Use] : no accessory muscle use [Normal Rate] : normal rate  [Regular Rhythm] : with a regular rhythm [Normal S1, S2] : normal S1 and S2 [No Murmur] : no murmur heard [Pedal Pulses Present] : the pedal pulses are present [No Edema] : there was no peripheral edema [No Extremity Clubbing/Cyanosis] : no extremity clubbing/cyanosis [Soft] : abdomen soft [Non Tender] : non-tender [Non-distended] : non-distended [No Masses] : no abdominal mass palpated [No HSM] : no HSM [Normal Bowel Sounds] : normal bowel sounds [Normal Supraclavicular Nodes] : no supraclavicular lymphadenopathy [Normal Posterior Cervical Nodes] : no posterior cervical lymphadenopathy [Normal Anterior Cervical Nodes] : no anterior cervical lymphadenopathy [No CVA Tenderness] : no CVA  tenderness [No Spinal Tenderness] : no spinal tenderness [Grossly Normal Strength/Tone] : grossly normal strength/tone [No Rash] : no rash [Normal Gait] : normal gait [Coordination Grossly Intact] : coordination grossly intact [No Focal Deficits] : no focal deficits [Normal Affect] : the affect was normal [Normal Insight/Judgement] : insight and judgment were intact [de-identified] : Pain with knee flexion, pain with hip internal and external rotation

## 2018-07-31 DIAGNOSIS — H61.22 IMPACTED CERUMEN, LEFT EAR: ICD-10-CM

## 2018-07-31 DIAGNOSIS — M25.561 PAIN IN RIGHT KNEE: ICD-10-CM

## 2018-07-31 DIAGNOSIS — R20.0 ANESTHESIA OF SKIN: ICD-10-CM

## 2018-07-31 DIAGNOSIS — R42 DIZZINESS AND GIDDINESS: ICD-10-CM

## 2018-07-31 DIAGNOSIS — M25.562 PAIN IN LEFT KNEE: ICD-10-CM

## 2018-07-31 DIAGNOSIS — M19.90 UNSPECIFIED OSTEOARTHRITIS, UNSPECIFIED SITE: ICD-10-CM

## 2018-07-31 DIAGNOSIS — M81.0 AGE-RELATED OSTEOPOROSIS WITHOUT CURRENT PATHOLOGICAL FRACTURE: ICD-10-CM

## 2018-08-30 ENCOUNTER — OUTPATIENT (OUTPATIENT)
Dept: OUTPATIENT SERVICES | Facility: HOSPITAL | Age: 73
LOS: 1 days | End: 2018-08-30
Payer: MEDICARE

## 2018-08-30 ENCOUNTER — APPOINTMENT (OUTPATIENT)
Dept: INTERNAL MEDICINE | Facility: CLINIC | Age: 73
End: 2018-08-30
Payer: MEDICARE

## 2018-08-30 VITALS
DIASTOLIC BLOOD PRESSURE: 69 MMHG | HEART RATE: 81 BPM | WEIGHT: 127 LBS | OXYGEN SATURATION: 98 % | SYSTOLIC BLOOD PRESSURE: 127 MMHG | BODY MASS INDEX: 21.8 KG/M2

## 2018-08-30 DIAGNOSIS — Z98.42 CATARACT EXTRACTION STATUS, LEFT EYE: Chronic | ICD-10-CM

## 2018-08-30 DIAGNOSIS — R42 DIZZINESS AND GIDDINESS: ICD-10-CM

## 2018-08-30 DIAGNOSIS — Z41.1 ENCOUNTER FOR COSMETIC SURGERY: Chronic | ICD-10-CM

## 2018-08-30 DIAGNOSIS — Z98.89 OTHER SPECIFIED POSTPROCEDURAL STATES: Chronic | ICD-10-CM

## 2018-08-30 DIAGNOSIS — I10 ESSENTIAL (PRIMARY) HYPERTENSION: ICD-10-CM

## 2018-08-30 PROCEDURE — 99213 OFFICE O/P EST LOW 20 MIN: CPT | Mod: GE

## 2018-08-30 PROCEDURE — G0463: CPT

## 2018-08-30 RX ORDER — ALENDRONATE SODIUM 70 MG/1
70 TABLET ORAL
Qty: 5 | Refills: 6 | Status: ACTIVE | COMMUNITY
Start: 2018-07-11 | End: 1900-01-01

## 2018-08-31 ENCOUNTER — APPOINTMENT (OUTPATIENT)
Dept: RHEUMATOLOGY | Facility: HOSPITAL | Age: 73
End: 2018-08-31
Payer: MEDICAID

## 2018-08-31 ENCOUNTER — OUTPATIENT (OUTPATIENT)
Dept: OUTPATIENT SERVICES | Facility: HOSPITAL | Age: 73
LOS: 1 days | End: 2018-08-31
Payer: MEDICARE

## 2018-08-31 VITALS
RESPIRATION RATE: 16 BRPM | HEIGHT: 64 IN | BODY MASS INDEX: 21.51 KG/M2 | WEIGHT: 126 LBS | HEART RATE: 54 BPM | DIASTOLIC BLOOD PRESSURE: 73 MMHG | SYSTOLIC BLOOD PRESSURE: 152 MMHG

## 2018-08-31 DIAGNOSIS — M19.90 UNSPECIFIED OSTEOARTHRITIS, UNSPECIFIED SITE: ICD-10-CM

## 2018-08-31 DIAGNOSIS — Z41.1 ENCOUNTER FOR COSMETIC SURGERY: Chronic | ICD-10-CM

## 2018-08-31 DIAGNOSIS — M06.9 RHEUMATOID ARTHRITIS, UNSPECIFIED: ICD-10-CM

## 2018-08-31 DIAGNOSIS — Z98.42 CATARACT EXTRACTION STATUS, LEFT EYE: Chronic | ICD-10-CM

## 2018-08-31 DIAGNOSIS — Z98.89 OTHER SPECIFIED POSTPROCEDURAL STATES: Chronic | ICD-10-CM

## 2018-08-31 PROBLEM — R42 DIZZINESS: Status: ACTIVE | Noted: 2018-07-28

## 2018-08-31 PROCEDURE — G0463: CPT

## 2018-08-31 PROCEDURE — 99214 OFFICE O/P EST MOD 30 MIN: CPT | Mod: GC

## 2018-08-31 NOTE — PHYSICAL EXAM
[No Acute Distress] : no acute distress [Well Nourished] : well nourished [Well Developed] : well developed [Well-Appearing] : well-appearing [Normal Sclera/Conjunctiva] : normal sclera/conjunctiva [PERRL] : pupils equal round and reactive to light [EOMI] : extraocular movements intact [Normal Outer Ear/Nose] : the outer ears and nose were normal in appearance [Normal Oropharynx] : the oropharynx was normal [No JVD] : no jugular venous distention [Supple] : supple [No Lymphadenopathy] : no lymphadenopathy [No Respiratory Distress] : no respiratory distress  [Clear to Auscultation] : lungs were clear to auscultation bilaterally [No Accessory Muscle Use] : no accessory muscle use [Normal Rate] : normal rate  [Regular Rhythm] : with a regular rhythm [Normal S1, S2] : normal S1 and S2 [No Murmur] : no murmur heard [Pedal Pulses Present] : the pedal pulses are present [No Edema] : there was no peripheral edema [No Extremity Clubbing/Cyanosis] : no extremity clubbing/cyanosis [Soft] : abdomen soft [Non Tender] : non-tender [Non-distended] : non-distended [No Masses] : no abdominal mass palpated [No HSM] : no HSM [Normal Bowel Sounds] : normal bowel sounds [Normal Supraclavicular Nodes] : no supraclavicular lymphadenopathy [Normal Posterior Cervical Nodes] : no posterior cervical lymphadenopathy [Normal Anterior Cervical Nodes] : no anterior cervical lymphadenopathy [No CVA Tenderness] : no CVA  tenderness [No Spinal Tenderness] : no spinal tenderness [Grossly Normal Strength/Tone] : grossly normal strength/tone [No Rash] : no rash [Normal Gait] : normal gait [Coordination Grossly Intact] : coordination grossly intact [No Focal Deficits] : no focal deficits [Normal Affect] : the affect was normal [Normal Insight/Judgement] : insight and judgment were intact [de-identified] : b/l partial cerumen impaction [de-identified] : Pain with b/l knee flexion

## 2018-08-31 NOTE — REVIEW OF SYSTEMS
[Joint Pain] : joint pain [Back Pain] : back pain [Fever] : no fever [Chills] : no chills [Fatigue] : no fatigue [Chest Pain] : no chest pain [Palpitations] : no palpitations [Lower Ext Edema] : no lower extremity edema [Shortness Of Breath] : no shortness of breath [Wheezing] : no wheezing [Cough] : no cough [Abdominal Pain] : no abdominal pain [Nausea] : no nausea [Diarrhea] : diarrhea [Vomiting] : no vomiting [Dysuria] : no dysuria [Hematuria] : no hematuria [Frequency] : no frequency [Muscle Pain] : no muscle pain [Itching] : no itching [Headache] : no headache [Dizziness] : no dizziness [Memory Loss] : no memory loss

## 2018-08-31 NOTE — HISTORY OF PRESENT ILLNESS
[FreeTextEntry1] : dizziness [de-identified] : 71yo F with PMH of CPPD/psuedogout, HTN, HLD, actinic keratoses, seborrheic keratosis, osteoporosis who presents for follow up for dizziness.\par \par #Dizziness - Reports dizziness has resolved since eating and drinking water every morning.  \par \par #OA and CPPD - Still c/o pain in lower back, hips, ankles and knees.  Pain is worse with humidity.  Has good days and bad days, using her colchicine regularly, and diclofenac gel. Will f/u with Rheum. Has been unable to obtain shower seat, also needs paperwork completed for handicap parking permit.\par \par #Impacted cerumen - Has been using ear drops for L ear. \par \par Sleeps about six hours nightly, naps 5-10 minutes each, denies fatigue.\par  \par HCM -\par -Mammogram 1/2018 birads 2\par -Colonoscopy in 2015 with multiple polyps, ordered 6/2018\par - Pap/hpv - 10/2013 neg

## 2018-08-31 NOTE — ASSESSMENT
[FreeTextEntry1] : 73yo F with PMH of CPPD/psuedogout, HTN, HLD, actinic keratoses, seborrheic keratosis, osteoporosis who presents for follow up for dizziness.\par \par #Dizziness - stable. Also with L ICA stenosis on MRI.\par - cont to monitor\par - limit polypharmacy\par - f/u with neuro \par \par #Neuropathy - EMG consistent with neuropathy in 7/2018.\par - ordered  b12 and tsh\par - f/u with neuro \par \par #OA and CPPD - cont diclofenac \par - cont PT\par - conchicine as needed\par - will ask Cassandra for help for obtaining toilet assist frame and shower seat\par \par #Impacted cerumen b/l \par - cont debrox for b/l ears\par - if no improvement, can do washout\par \par HCM -\par -Mammogram 1/2018 birads 2\par -Colonoscopy in 2015 with multiple polyps, ordered 6/2018\par - Pap/hpv - 10/2013 neg\par \par f/u in 10 weeks \par d/w Dr Loredo

## 2018-09-13 DIAGNOSIS — R42 DIZZINESS AND GIDDINESS: ICD-10-CM

## 2018-09-13 DIAGNOSIS — G62.9 POLYNEUROPATHY, UNSPECIFIED: ICD-10-CM

## 2018-09-13 DIAGNOSIS — M19.90 UNSPECIFIED OSTEOARTHRITIS, UNSPECIFIED SITE: ICD-10-CM

## 2018-10-04 ENCOUNTER — OUTPATIENT (OUTPATIENT)
Dept: OUTPATIENT SERVICES | Facility: HOSPITAL | Age: 73
LOS: 1 days | End: 2018-10-04
Payer: MEDICARE

## 2018-10-04 ENCOUNTER — APPOINTMENT (OUTPATIENT)
Dept: NEUROLOGY | Facility: HOSPITAL | Age: 73
End: 2018-10-04

## 2018-10-04 VITALS
HEIGHT: 64 IN | WEIGHT: 123 LBS | BODY MASS INDEX: 21 KG/M2 | SYSTOLIC BLOOD PRESSURE: 108 MMHG | DIASTOLIC BLOOD PRESSURE: 66 MMHG | HEART RATE: 66 BPM

## 2018-10-04 DIAGNOSIS — Z41.1 ENCOUNTER FOR COSMETIC SURGERY: Chronic | ICD-10-CM

## 2018-10-04 DIAGNOSIS — M79.603 PAIN IN ARM, UNSPECIFIED: ICD-10-CM

## 2018-10-04 DIAGNOSIS — K11.7 DISTURBANCES OF SALIVARY SECRETION: ICD-10-CM

## 2018-10-04 DIAGNOSIS — Z98.89 OTHER SPECIFIED POSTPROCEDURAL STATES: Chronic | ICD-10-CM

## 2018-10-04 DIAGNOSIS — R56.9 UNSPECIFIED CONVULSIONS: ICD-10-CM

## 2018-10-04 DIAGNOSIS — Z98.42 CATARACT EXTRACTION STATUS, LEFT EYE: Chronic | ICD-10-CM

## 2018-10-04 LAB — HBA1C BLD-MCNC: 5.6 % — SIGNIFICANT CHANGE UP (ref 4–5.6)

## 2018-10-04 PROCEDURE — G0463: CPT

## 2018-10-04 PROCEDURE — 86042 ACETYLCHOLN RCPTR BLCKG ANTB: CPT

## 2018-10-04 PROCEDURE — 84238 ASSAY NONENDOCRINE RECEPTOR: CPT

## 2018-10-04 PROCEDURE — 86043 ACETYLCHOLN RCPTR MODLG ANTB: CPT

## 2018-10-04 PROCEDURE — 86366 MUSCLE-SPECIFIC KINASE ANTB: CPT

## 2018-10-04 PROCEDURE — 83036 HEMOGLOBIN GLYCOSYLATED A1C: CPT

## 2018-10-04 PROCEDURE — 86041 ACETYLCHOLN RCPTR BNDNG ANTB: CPT

## 2018-10-05 ENCOUNTER — OTHER (OUTPATIENT)
Age: 73
End: 2018-10-05

## 2018-10-05 RX ORDER — DICLOFENAC SODIUM 10 MG/G
1 GEL TOPICAL DAILY
Qty: 1 | Refills: 2 | Status: ACTIVE | COMMUNITY
Start: 2018-05-25 | End: 1900-01-01

## 2018-10-09 LAB
ACHR BIND AB SER-ACNC: <0.3 NMOL/L — SIGNIFICANT CHANGE UP
ACHR BLOCK AB SER-ACNC: <15 — SIGNIFICANT CHANGE UP
ACHR MOD AB SER-ACNC: 6 — SIGNIFICANT CHANGE UP

## 2018-10-10 LAB — ACRM MODULATING ANTIBODY: 0 NMOL/L — SIGNIFICANT CHANGE UP

## 2018-10-11 LAB — MUSK IGG SER IA-MCNC: SIGNIFICANT CHANGE UP

## 2018-11-05 ENCOUNTER — APPOINTMENT (OUTPATIENT)
Dept: INTERNAL MEDICINE | Facility: CLINIC | Age: 73
End: 2018-11-05
Payer: MEDICARE

## 2018-11-05 ENCOUNTER — OUTPATIENT (OUTPATIENT)
Dept: OUTPATIENT SERVICES | Facility: HOSPITAL | Age: 73
LOS: 1 days | End: 2018-11-05
Payer: MEDICARE

## 2018-11-05 VITALS
SYSTOLIC BLOOD PRESSURE: 110 MMHG | WEIGHT: 125 LBS | OXYGEN SATURATION: 94 % | BODY MASS INDEX: 21.46 KG/M2 | HEART RATE: 83 BPM | DIASTOLIC BLOOD PRESSURE: 65 MMHG

## 2018-11-05 DIAGNOSIS — Z98.89 OTHER SPECIFIED POSTPROCEDURAL STATES: Chronic | ICD-10-CM

## 2018-11-05 DIAGNOSIS — M25.562 PAIN IN RIGHT KNEE: ICD-10-CM

## 2018-11-05 DIAGNOSIS — I10 ESSENTIAL (PRIMARY) HYPERTENSION: ICD-10-CM

## 2018-11-05 DIAGNOSIS — G89.29 OTHER CHRONIC PAIN: ICD-10-CM

## 2018-11-05 DIAGNOSIS — M54.5 LOW BACK PAIN: ICD-10-CM

## 2018-11-05 DIAGNOSIS — E78.5 HYPERLIPIDEMIA, UNSPECIFIED: ICD-10-CM

## 2018-11-05 DIAGNOSIS — M25.561 PAIN IN RIGHT KNEE: ICD-10-CM

## 2018-11-05 DIAGNOSIS — G89.29 LOW BACK PAIN: ICD-10-CM

## 2018-11-05 DIAGNOSIS — L82.1 OTHER SEBORRHEIC KERATOSIS: ICD-10-CM

## 2018-11-05 DIAGNOSIS — Z98.42 CATARACT EXTRACTION STATUS, LEFT EYE: Chronic | ICD-10-CM

## 2018-11-05 DIAGNOSIS — G62.9 POLYNEUROPATHY, UNSPECIFIED: ICD-10-CM

## 2018-11-05 DIAGNOSIS — Z41.1 ENCOUNTER FOR COSMETIC SURGERY: Chronic | ICD-10-CM

## 2018-11-05 DIAGNOSIS — M25.562 PAIN IN LEFT KNEE: ICD-10-CM

## 2018-11-05 PROCEDURE — G0463: CPT

## 2018-11-05 PROCEDURE — 99213 OFFICE O/P EST LOW 20 MIN: CPT | Mod: GE

## 2018-11-05 RX ORDER — COLCHICINE 0.6 MG/1
0.6 TABLET ORAL
Qty: 31 | Refills: 0 | Status: DISCONTINUED | COMMUNITY
Start: 2018-06-13 | End: 2018-11-05

## 2018-11-05 RX ORDER — ATORVASTATIN CALCIUM 20 MG/1
20 TABLET, FILM COATED ORAL DAILY
Qty: 30 | Refills: 6 | Status: ACTIVE | COMMUNITY
Start: 2018-11-05 | End: 1900-01-01

## 2018-11-05 RX ORDER — CALCIUM CARBONATE 160(400)MG
TABLET,CHEWABLE ORAL
Qty: 1 | Refills: 0 | Status: ACTIVE | OUTPATIENT
Start: 2018-11-05

## 2018-11-05 NOTE — PHYSICAL EXAM
[No Acute Distress] : no acute distress [Well Nourished] : well nourished [Well Developed] : well developed [Well-Appearing] : well-appearing [Normal Sclera/Conjunctiva] : normal sclera/conjunctiva [PERRL] : pupils equal round and reactive to light [EOMI] : extraocular movements intact [Normal Outer Ear/Nose] : the outer ears and nose were normal in appearance [Normal Oropharynx] : the oropharynx was normal [No JVD] : no jugular venous distention [Supple] : supple [No Lymphadenopathy] : no lymphadenopathy [No Respiratory Distress] : no respiratory distress  [Clear to Auscultation] : lungs were clear to auscultation bilaterally [No Accessory Muscle Use] : no accessory muscle use [Normal Rate] : normal rate  [Regular Rhythm] : with a regular rhythm [Normal S1, S2] : normal S1 and S2 [No Murmur] : no murmur heard [Pedal Pulses Present] : the pedal pulses are present [No Edema] : there was no peripheral edema [No Extremity Clubbing/Cyanosis] : no extremity clubbing/cyanosis [Soft] : abdomen soft [Non Tender] : non-tender [Non-distended] : non-distended [No Masses] : no abdominal mass palpated [No HSM] : no HSM [Normal Bowel Sounds] : normal bowel sounds [No CVA Tenderness] : no CVA  tenderness [No Spinal Tenderness] : no spinal tenderness [No Joint Swelling] : no joint swelling [Grossly Normal Strength/Tone] : grossly normal strength/tone [No Rash] : no rash [Normal Gait] : normal gait [Coordination Grossly Intact] : coordination grossly intact [No Focal Deficits] : no focal deficits [Normal Affect] : the affect was normal [Normal Insight/Judgement] : insight and judgment were intact

## 2018-11-06 ENCOUNTER — APPOINTMENT (OUTPATIENT)
Dept: GASTROENTEROLOGY | Facility: HOSPITAL | Age: 73
End: 2018-11-06

## 2018-11-06 ENCOUNTER — OUTPATIENT (OUTPATIENT)
Dept: OUTPATIENT SERVICES | Facility: HOSPITAL | Age: 73
LOS: 1 days | End: 2018-11-06
Payer: MEDICARE

## 2018-11-06 VITALS
DIASTOLIC BLOOD PRESSURE: 76 MMHG | SYSTOLIC BLOOD PRESSURE: 138 MMHG | HEIGHT: 64 IN | RESPIRATION RATE: 14 BRPM | WEIGHT: 125 LBS | HEART RATE: 60 BPM | BODY MASS INDEX: 21.34 KG/M2

## 2018-11-06 DIAGNOSIS — Z12.12 ENCOUNTER FOR SCREENING FOR MALIGNANT NEOPLASM OF COLON: ICD-10-CM

## 2018-11-06 DIAGNOSIS — Z87.891 PERSONAL HISTORY OF NICOTINE DEPENDENCE: ICD-10-CM

## 2018-11-06 DIAGNOSIS — Z98.89 OTHER SPECIFIED POSTPROCEDURAL STATES: Chronic | ICD-10-CM

## 2018-11-06 DIAGNOSIS — D12.6 BENIGN NEOPLASM OF COLON, UNSPECIFIED: ICD-10-CM

## 2018-11-06 DIAGNOSIS — Z98.42 CATARACT EXTRACTION STATUS, LEFT EYE: Chronic | ICD-10-CM

## 2018-11-06 DIAGNOSIS — K31.9 DISEASE OF STOMACH AND DUODENUM, UNSPECIFIED: ICD-10-CM

## 2018-11-06 DIAGNOSIS — Z12.11 ENCOUNTER FOR SCREENING FOR MALIGNANT NEOPLASM OF COLON: ICD-10-CM

## 2018-11-06 DIAGNOSIS — Z78.9 OTHER SPECIFIED HEALTH STATUS: ICD-10-CM

## 2018-11-06 DIAGNOSIS — Z41.1 ENCOUNTER FOR COSMETIC SURGERY: Chronic | ICD-10-CM

## 2018-11-06 PROBLEM — M25.561 BILATERAL KNEE PAIN: Status: ACTIVE | Noted: 2018-06-13

## 2018-11-06 PROCEDURE — G0463: CPT

## 2018-12-03 ENCOUNTER — APPOINTMENT (OUTPATIENT)
Dept: NEUROLOGY | Facility: CLINIC | Age: 73
End: 2018-12-03
Payer: MEDICARE

## 2018-12-03 PROCEDURE — 95937 NEUROMUSCULAR JUNCTION TEST: CPT

## 2018-12-03 PROCEDURE — 95907 NVR CNDJ TST 1-2 STUDIES: CPT

## 2018-12-03 PROCEDURE — 95886 MUSC TEST DONE W/N TEST COMP: CPT

## 2018-12-03 PROCEDURE — 95887 MUSC TST DONE W/N TST NONEXT: CPT

## 2018-12-03 NOTE — REVIEW OF SYSTEMS
[Joint Pain] : joint pain [Muscle Weakness] : muscle weakness [Unsteady Walking] : ataxia [Fever] : no fever [Chills] : no chills [Fatigue] : no fatigue [Chest Pain] : no chest pain [Palpitations] : no palpitations [Lower Ext Edema] : no lower extremity edema [Shortness Of Breath] : no shortness of breath [Wheezing] : no wheezing [Cough] : no cough [Abdominal Pain] : no abdominal pain [Nausea] : no nausea [Vomiting] : no vomiting [Dysuria] : no dysuria [Incontinence] : no incontinence [Nocturia] : no nocturia [Frequency] : no frequency [Headache] : no headache [Dizziness] : no dizziness

## 2018-12-03 NOTE — ASSESSMENT
[FreeTextEntry1] : 72 yo F PMH of CPPD/psuedogout, HTN, HLD, actinic keratoses, seborrheic keratosis, osteoporosis who presents for follow up\par \par HCM -will get colonoscopy eval tomorrow\par pap/HPv -No longer necessary, as per gyn\par IMM - flu and prevnar 10/1\par [facility] - Mammogram 1/2018, BIRADS 2

## 2018-12-03 NOTE — HISTORY OF PRESENT ILLNESS
[FreeTextEntry1] : neuropathy [de-identified] : 72 yo F PMH of CPPD/psuedogout, HTN, HLD, actinic keratoses, seborrheic keratosis, osteoporosis who presents for follow up. \par \par #neuropathy - Reports persistent loss of sensation of 3rd digit of feet, b/l, and contributing to instability. Seen by PT and Neuro. Neuro Recommended repeat EMG. Pt was Recommended for rollator. reports improvement with PT and would like referral to do it again. Reports able to complete ADLs and IADLs.\par \par #OA/chronic back pain - Reports back and hip intermittently, most recently 1-2 wks ago, spontaneously, now resolved and improves with NSAIDs. \par \par #HTN - BP stable with Felodipine 10mg qd. \par \par #seborrheic keratosis - pt with hx of seborrheic keratosis. Has multiple lesions throughout back and requesting dermatology referral.\par \par HCM -will get colonoscopy eval tomorrow with GI\par pap -was told was by gyn no longer necessary\par received flu and prevnar at pharmacy\par [facility] - Mammogram 1/2018, BIRADS 2

## 2018-12-12 ENCOUNTER — APPOINTMENT (OUTPATIENT)
Dept: DERMATOLOGY | Facility: CLINIC | Age: 73
End: 2018-12-12

## 2018-12-13 ENCOUNTER — APPOINTMENT (OUTPATIENT)
Dept: DERMATOLOGY | Facility: CLINIC | Age: 73
End: 2018-12-13
Payer: MEDICARE

## 2018-12-13 VITALS — SYSTOLIC BLOOD PRESSURE: 140 MMHG | DIASTOLIC BLOOD PRESSURE: 70 MMHG

## 2018-12-13 DIAGNOSIS — Z12.83 ENCOUNTER FOR SCREENING FOR MALIGNANT NEOPLASM OF SKIN: ICD-10-CM

## 2018-12-13 DIAGNOSIS — L82.1 OTHER SEBORRHEIC KERATOSIS: ICD-10-CM

## 2018-12-13 DIAGNOSIS — L60.3 NAIL DYSTROPHY: ICD-10-CM

## 2018-12-13 DIAGNOSIS — L57.0 ACTINIC KERATOSIS: ICD-10-CM

## 2018-12-13 DIAGNOSIS — L30.9 DERMATITIS, UNSPECIFIED: ICD-10-CM

## 2018-12-13 PROCEDURE — 99214 OFFICE O/P EST MOD 30 MIN: CPT | Mod: 25

## 2018-12-13 PROCEDURE — 17003 DESTRUCT PREMALG LES 2-14: CPT

## 2018-12-13 PROCEDURE — 17000 DESTRUCT PREMALG LESION: CPT

## 2018-12-19 ENCOUNTER — OUTPATIENT (OUTPATIENT)
Dept: OUTPATIENT SERVICES | Facility: HOSPITAL | Age: 73
LOS: 1 days | End: 2018-12-19
Payer: MEDICARE

## 2018-12-19 ENCOUNTER — RESULT REVIEW (OUTPATIENT)
Age: 73
End: 2018-12-19

## 2018-12-19 DIAGNOSIS — Z41.1 ENCOUNTER FOR COSMETIC SURGERY: Chronic | ICD-10-CM

## 2018-12-19 DIAGNOSIS — Z98.42 CATARACT EXTRACTION STATUS, LEFT EYE: Chronic | ICD-10-CM

## 2018-12-19 DIAGNOSIS — D12.6 BENIGN NEOPLASM OF COLON, UNSPECIFIED: ICD-10-CM

## 2018-12-19 DIAGNOSIS — Z98.89 OTHER SPECIFIED POSTPROCEDURAL STATES: Chronic | ICD-10-CM

## 2018-12-19 PROCEDURE — 45380 COLONOSCOPY AND BIOPSY: CPT | Mod: 59

## 2018-12-19 PROCEDURE — 45380 COLONOSCOPY AND BIOPSY: CPT | Mod: XS,PT

## 2018-12-19 PROCEDURE — 88305 TISSUE EXAM BY PATHOLOGIST: CPT | Mod: 26

## 2018-12-19 PROCEDURE — 45385 COLONOSCOPY W/LESION REMOVAL: CPT

## 2018-12-19 PROCEDURE — 45385 COLONOSCOPY W/LESION REMOVAL: CPT | Mod: PT

## 2018-12-19 PROCEDURE — 88305 TISSUE EXAM BY PATHOLOGIST: CPT

## 2018-12-21 LAB — SURGICAL PATHOLOGY STUDY: SIGNIFICANT CHANGE UP

## 2018-12-28 ENCOUNTER — APPOINTMENT (OUTPATIENT)
Dept: INTERNAL MEDICINE | Facility: CLINIC | Age: 73
End: 2018-12-28
Payer: MEDICARE

## 2018-12-28 ENCOUNTER — OUTPATIENT (OUTPATIENT)
Dept: OUTPATIENT SERVICES | Facility: HOSPITAL | Age: 73
LOS: 1 days | End: 2018-12-28
Payer: MEDICARE

## 2018-12-28 VITALS
SYSTOLIC BLOOD PRESSURE: 110 MMHG | HEIGHT: 64 IN | OXYGEN SATURATION: 96 % | DIASTOLIC BLOOD PRESSURE: 70 MMHG | WEIGHT: 128 LBS | BODY MASS INDEX: 21.85 KG/M2 | HEART RATE: 66 BPM

## 2018-12-28 VITALS
OXYGEN SATURATION: 96 % | HEIGHT: 64 IN | SYSTOLIC BLOOD PRESSURE: 110 MMHG | DIASTOLIC BLOOD PRESSURE: 70 MMHG | HEART RATE: 66 BPM | BODY MASS INDEX: 21.85 KG/M2 | WEIGHT: 128 LBS

## 2018-12-28 DIAGNOSIS — E78.5 HYPERLIPIDEMIA, UNSPECIFIED: ICD-10-CM

## 2018-12-28 DIAGNOSIS — K21.9 GASTRO-ESOPHAGEAL REFLUX DISEASE W/OUT ESOPHAGITIS: ICD-10-CM

## 2018-12-28 DIAGNOSIS — Z98.89 OTHER SPECIFIED POSTPROCEDURAL STATES: Chronic | ICD-10-CM

## 2018-12-28 DIAGNOSIS — H16.139 PHOTOKERATITIS, UNSPECIFIED EYE: ICD-10-CM

## 2018-12-28 DIAGNOSIS — I10 ESSENTIAL (PRIMARY) HYPERTENSION: ICD-10-CM

## 2018-12-28 DIAGNOSIS — Z41.1 ENCOUNTER FOR COSMETIC SURGERY: Chronic | ICD-10-CM

## 2018-12-28 DIAGNOSIS — Z98.42 CATARACT EXTRACTION STATUS, LEFT EYE: Chronic | ICD-10-CM

## 2018-12-28 PROCEDURE — 99213 OFFICE O/P EST LOW 20 MIN: CPT | Mod: GE

## 2018-12-28 PROCEDURE — G0463: CPT

## 2018-12-28 NOTE — HISTORY OF PRESENT ILLNESS
[FreeTextEntry1] : paperwork [de-identified] : 72 yo F PMH of CPPD/psuedogout, HTN, HLD, actinic keratoses, seborrheic keratosis, osteoporosis who presents for follow up and paperwork\par \par #OA/chronic back pain - \par [facility] said for OA and form needed to be filled out, problems with walking and has a walker, has issues with balance and needs help ambulating and grabs herself from the wall, has fallen several times, 2-3 mons but no fractures (hip or knees or elbows) but with walker used to fall every week. ADLs able to eat and clean herself, daughter helps with her groceries and she lives with her\par \par #HTN - BP stable with Felodipine 10mg qd. \par \par #seborrheic keratosis - pt with hx of seborrheic keratosis. Has multiple lesions throughout back and derm for cryotherapy\par x 2 cycles\par \par HCM -\par Colonoscopy 12/13 but no results yet, has had in the past 2015 w/ adenomatous polyps\par pap -was told was by gyn no longer necessary\par received flu and prevnar \par [facility] - Mammogram 1/2018, BIRADS 2

## 2018-12-28 NOTE — ASSESSMENT
[FreeTextEntry1] : 72 yo F PMH of CPPD/psuedogout, HTN, HLD, actinic keratoses, seborrheic keratosis, osteoporosis who presents for follow up and paperwork\par \par #OA/chronic back pain - \par -c/w OTC pain meds for now, c/w Diclofenac 1%\par -c/w Alendronate, Ca+Vit D\par -c/w Gabapentin\par \par #HLD-c/w Lipitor 20mg\par \par #HTN - BP stable with Felodipine 10mg qd. \par \par #seborrheic keratosis - pt with hx of seborrheic keratosis. Has multiple lesions throughout back and derm for cryotherapy\par x 2 cycles\par \par HCM -\par Colonoscopy 12/13 but no results yet, has had in the past 2015 w/ adenomatous polyps\par pap -was told was by gyn no longer necessary\par received flu and prevnar \par [facility] - Mammogram 1/2018, BIRADS 2\par \par Case d/w Dr. Prabhakar \par \par RTC in 6 mons

## 2019-01-03 ENCOUNTER — APPOINTMENT (OUTPATIENT)
Dept: NEUROLOGY | Facility: HOSPITAL | Age: 74
End: 2019-01-03

## 2019-01-03 ENCOUNTER — OUTPATIENT (OUTPATIENT)
Dept: OUTPATIENT SERVICES | Facility: HOSPITAL | Age: 74
LOS: 1 days | End: 2019-01-03
Payer: MEDICARE

## 2019-01-03 VITALS
SYSTOLIC BLOOD PRESSURE: 138 MMHG | DIASTOLIC BLOOD PRESSURE: 69 MMHG | HEART RATE: 59 BPM | HEIGHT: 64 IN | WEIGHT: 128 LBS | BODY MASS INDEX: 21.85 KG/M2

## 2019-01-03 DIAGNOSIS — R20.0 ANESTHESIA OF SKIN: ICD-10-CM

## 2019-01-03 DIAGNOSIS — G62.9 POLYNEUROPATHY, UNSPECIFIED: ICD-10-CM

## 2019-01-03 DIAGNOSIS — Z41.1 ENCOUNTER FOR COSMETIC SURGERY: Chronic | ICD-10-CM

## 2019-01-03 DIAGNOSIS — R56.9 UNSPECIFIED CONVULSIONS: ICD-10-CM

## 2019-01-03 DIAGNOSIS — Z98.89 OTHER SPECIFIED POSTPROCEDURAL STATES: Chronic | ICD-10-CM

## 2019-01-03 DIAGNOSIS — Z98.42 CATARACT EXTRACTION STATUS, LEFT EYE: Chronic | ICD-10-CM

## 2019-01-03 LAB
FOLATE SERPL-MCNC: >20 NG/ML — SIGNIFICANT CHANGE UP
T3FREE SERPL-MCNC: 2.43 PG/ML — SIGNIFICANT CHANGE UP (ref 1.8–4.6)
T4 AB SER-ACNC: 6.3 UG/DL — SIGNIFICANT CHANGE UP (ref 4.6–12)
TSH SERPL-MCNC: 4.52 UIU/ML — HIGH (ref 0.27–4.2)
VIT B12 SERPL-MCNC: 546 PG/ML — SIGNIFICANT CHANGE UP (ref 232–1245)

## 2019-01-03 PROCEDURE — 86038 ANTINUCLEAR ANTIBODIES: CPT

## 2019-01-03 PROCEDURE — 84436 ASSAY OF TOTAL THYROXINE: CPT

## 2019-01-03 PROCEDURE — 84443 ASSAY THYROID STIM HORMONE: CPT

## 2019-01-03 PROCEDURE — 84481 FREE ASSAY (FT-3): CPT

## 2019-01-03 PROCEDURE — G0463: CPT

## 2019-01-03 PROCEDURE — 82746 ASSAY OF FOLIC ACID SERUM: CPT

## 2019-01-03 PROCEDURE — 84165 PROTEIN E-PHORESIS SERUM: CPT

## 2019-01-03 PROCEDURE — 82607 VITAMIN B-12: CPT

## 2019-01-03 RX ORDER — GABAPENTIN 300 MG/1
300 CAPSULE ORAL
Qty: 30 | Refills: 1 | Status: DISCONTINUED | COMMUNITY
Start: 2018-11-05 | End: 2019-01-03

## 2019-01-03 RX ORDER — POLYETHYLENE GLYCOL 3350 AND ELECTROLYTES WITH LEMON FLAVOR 236; 22.74; 6.74; 5.86; 2.97 G/4L; G/4L; G/4L; G/4L; G/4L
236 POWDER, FOR SOLUTION ORAL
Qty: 1 | Refills: 0 | Status: DISCONTINUED | COMMUNITY
Start: 2018-11-06 | End: 2019-01-03

## 2019-01-04 DIAGNOSIS — E78.5 HYPERLIPIDEMIA, UNSPECIFIED: ICD-10-CM

## 2019-01-04 DIAGNOSIS — K21.9 GASTRO-ESOPHAGEAL REFLUX DISEASE WITHOUT ESOPHAGITIS: ICD-10-CM

## 2019-01-04 DIAGNOSIS — H16.139 PHOTOKERATITIS, UNSPECIFIED EYE: ICD-10-CM

## 2019-01-04 DIAGNOSIS — M19.90 UNSPECIFIED OSTEOARTHRITIS, UNSPECIFIED SITE: ICD-10-CM

## 2019-01-04 LAB
% ALBUMIN: 57.2 % — SIGNIFICANT CHANGE UP
% ALPHA 1: 4.4 % — SIGNIFICANT CHANGE UP
% ALPHA 2: 14.1 % — SIGNIFICANT CHANGE UP
% BETA: 11.5 % — SIGNIFICANT CHANGE UP
% GAMMA: 12.8 % — SIGNIFICANT CHANGE UP
ALBUMIN SERPL ELPH-MCNC: 4.3 G/DL — SIGNIFICANT CHANGE UP (ref 3.6–5.5)
ALBUMIN/GLOB SERPL ELPH: 1.3 RATIO — SIGNIFICANT CHANGE UP
ALPHA1 GLOB SERPL ELPH-MCNC: 0.3 G/DL — SIGNIFICANT CHANGE UP (ref 0.1–0.4)
ALPHA2 GLOB SERPL ELPH-MCNC: 1.1 G/DL — HIGH (ref 0.5–1)
ANA TITR SER: NEGATIVE — SIGNIFICANT CHANGE UP
B-GLOBULIN SERPL ELPH-MCNC: 0.9 G/DL — SIGNIFICANT CHANGE UP (ref 0.5–1)
GAMMA GLOBULIN: 1 G/DL — SIGNIFICANT CHANGE UP (ref 0.6–1.6)
IGA FLD-MCNC: 191 MG/DL — SIGNIFICANT CHANGE UP (ref 84–499)
IGG FLD-MCNC: 980 MG/DL — SIGNIFICANT CHANGE UP (ref 610–1660)
IGM SERPL-MCNC: 90 MG/DL — SIGNIFICANT CHANGE UP (ref 35–242)
INTERPRETATION SERPL IFE-IMP: SIGNIFICANT CHANGE UP
KAPPA LC SER QL IFE: 1.64 MG/DL — SIGNIFICANT CHANGE UP (ref 0.33–1.94)
KAPPA/LAMBDA FREE LIGHT CHAIN RATIO, SERUM: 1.21 RATIO — SIGNIFICANT CHANGE UP (ref 0.26–1.65)
LAMBDA LC SER QL IFE: 1.35 MG/DL — SIGNIFICANT CHANGE UP (ref 0.57–2.63)
PROT PATTERN SERPL ELPH-IMP: SIGNIFICANT CHANGE UP
PROT SERPL-MCNC: 7.5 G/DL — SIGNIFICANT CHANGE UP (ref 6–8.3)
PROT SERPL-MCNC: 7.5 G/DL — SIGNIFICANT CHANGE UP (ref 6–8.3)

## 2019-01-14 ENCOUNTER — APPOINTMENT (OUTPATIENT)
Dept: INTERNAL MEDICINE | Facility: CLINIC | Age: 74
End: 2019-01-14
Payer: MEDICARE

## 2019-01-14 ENCOUNTER — OUTPATIENT (OUTPATIENT)
Dept: OUTPATIENT SERVICES | Facility: HOSPITAL | Age: 74
LOS: 1 days | End: 2019-01-14
Payer: MEDICARE

## 2019-01-14 VITALS
BODY MASS INDEX: 21.51 KG/M2 | HEART RATE: 57 BPM | HEIGHT: 64 IN | WEIGHT: 126 LBS | DIASTOLIC BLOOD PRESSURE: 60 MMHG | OXYGEN SATURATION: 97 % | SYSTOLIC BLOOD PRESSURE: 112 MMHG

## 2019-01-14 DIAGNOSIS — Z98.89 OTHER SPECIFIED POSTPROCEDURAL STATES: Chronic | ICD-10-CM

## 2019-01-14 DIAGNOSIS — I10 ESSENTIAL (PRIMARY) HYPERTENSION: ICD-10-CM

## 2019-01-14 DIAGNOSIS — Z98.42 CATARACT EXTRACTION STATUS, LEFT EYE: Chronic | ICD-10-CM

## 2019-01-14 DIAGNOSIS — Z41.1 ENCOUNTER FOR COSMETIC SURGERY: Chronic | ICD-10-CM

## 2019-01-14 DIAGNOSIS — M19.90 UNSPECIFIED OSTEOARTHRITIS, UNSPECIFIED SITE: ICD-10-CM

## 2019-01-14 PROCEDURE — 99214 OFFICE O/P EST MOD 30 MIN: CPT | Mod: GC

## 2019-01-14 PROCEDURE — G0463: CPT

## 2019-01-14 RX ORDER — KETOCONAZOLE 20 MG/G
2 CREAM TOPICAL TWICE DAILY
Qty: 1 | Refills: 3 | Status: DISCONTINUED | COMMUNITY
Start: 2018-12-13 | End: 2019-01-14

## 2019-01-14 RX ORDER — ACETAMINOPHEN 500 MG/1
500 TABLET, COATED ORAL DAILY
Qty: 60 | Refills: 2 | Status: ACTIVE | COMMUNITY
Start: 2019-01-14 | End: 1900-01-01

## 2019-01-14 RX ORDER — ASPIRIN 81 MG
6.5 TABLET, DELAYED RELEASE (ENTERIC COATED) ORAL
Qty: 1 | Refills: 1 | Status: DISCONTINUED | COMMUNITY
Start: 2018-07-27 | End: 2019-01-14

## 2019-01-14 RX ORDER — CALCIUM CARBONATE/VITAMIN D3 600 MG-20
600-800 TABLET ORAL
Qty: 30 | Refills: 3 | Status: ACTIVE | COMMUNITY
Start: 2019-01-14 | End: 1900-01-01

## 2019-01-16 NOTE — PHYSICAL EXAM
[No Acute Distress] : no acute distress [Well Nourished] : well nourished [Well-Appearing] : well-appearing [Normal Sclera/Conjunctiva] : normal sclera/conjunctiva [PERRL] : pupils equal round and reactive to light [EOMI] : extraocular movements intact [Normal Outer Ear/Nose] : the outer ears and nose were normal in appearance [Normal Oropharynx] : the oropharynx was normal [No JVD] : no jugular venous distention [Supple] : supple [No Lymphadenopathy] : no lymphadenopathy [No Respiratory Distress] : no respiratory distress  [Clear to Auscultation] : lungs were clear to auscultation bilaterally [No Accessory Muscle Use] : no accessory muscle use [Normal Rate] : normal rate  [Regular Rhythm] : with a regular rhythm [Normal S1, S2] : normal S1 and S2 [No Murmur] : no murmur heard [Pedal Pulses Present] : the pedal pulses are present [No Edema] : there was no peripheral edema [No Extremity Clubbing/Cyanosis] : no extremity clubbing/cyanosis [Soft] : abdomen soft [Non Tender] : non-tender [Non-distended] : non-distended [No Masses] : no abdominal mass palpated [No HSM] : no HSM [Normal Bowel Sounds] : normal bowel sounds [Normal Supraclavicular Nodes] : no supraclavicular lymphadenopathy [Normal Posterior Cervical Nodes] : no posterior cervical lymphadenopathy [Normal Anterior Cervical Nodes] : no anterior cervical lymphadenopathy [No CVA Tenderness] : no CVA  tenderness [No Spinal Tenderness] : no spinal tenderness [No Joint Swelling] : no joint swelling [Grossly Normal Strength/Tone] : grossly normal strength/tone [No Rash] : no rash [Normal Gait] : normal gait [Coordination Grossly Intact] : coordination grossly intact [No Focal Deficits] : no focal deficits [Deep Tendon Reflexes (DTR)] : deep tendon reflexes were 2+ and symmetric [Normal Affect] : the affect was normal [Normal Insight/Judgement] : insight and judgment were intact

## 2019-01-27 NOTE — REVIEW OF SYSTEMS
[Joint Pain] : joint pain [Back Pain] : back pain [Fever] : no fever [Chills] : no chills [Fatigue] : no fatigue [Chest Pain] : no chest pain [Palpitations] : no palpitations [Lower Ext Edema] : no lower extremity edema [Shortness Of Breath] : no shortness of breath [Wheezing] : no wheezing [Cough] : no cough [Abdominal Pain] : no abdominal pain [Nausea] : no nausea [Vomiting] : no vomiting [Joint Swelling] : no joint swelling [Muscle Weakness] : no muscle weakness

## 2019-01-27 NOTE — HISTORY OF PRESENT ILLNESS
[FreeTextEntry1] : OA [de-identified] : 74 yo F PMH of CPPD/psuedogout, HTN, HLD, actinic keratoses, seborrheic keratosis, osteoporosis who presents for follow up.\par \par b/l toe neuropathy - Reports continued loss of sensation of 2nd and 3rd toe of bilateral feet. Has been following with neurology. Initially had EMG diagnosed with R leg neuropathy. UE EMG showed no neuropathy. Was taken off gabapentin and started on nortriptyline.  Reports no change in paresthesia and numbness. \par \par OA - Reports stable chronic b/l knee pain. Reports some improvement with diclofenac, has been using it BID. Requesting script for rollator today.\par \par htn - Doing well with felodipine. \par \par HCM -will get colonoscopy eval tomorrow with GI\par pap -was told was by gyn no longer necessary\par received flu and prevnar at pharmacy\par [facility] - Mammogram 1/2018, BIRADS 2

## 2019-01-27 NOTE — ASSESSMENT
[FreeTextEntry1] : 72 yo F PMH of CPPD/psuedogout, HTN, HLD, actinic keratoses, seborrheic keratosis, osteoporosis who presents for follow up.\par \par b/l toe neuropathy - \par - f/u neuro and ongoing w/u for neuropathy\par - incr nortryptyline to 25 qd\par \par OA -\par -cont diclofenac\par - rollator script given\par - cont tylenol PRN\par \par htn - cont felodipine. \par \par HCM \par Colonoscopy completed in [facility] 2018, pending final report\par received flu and prevnar at pharmacy\par [facility] - Mammogram 1/2018, BIRADS 2 \par \par f/u in 5 wks for neuropathy and pain eval

## 2019-01-31 ENCOUNTER — APPOINTMENT (OUTPATIENT)
Dept: OPHTHALMOLOGY | Facility: CLINIC | Age: 74
End: 2019-01-31
Payer: MEDICARE

## 2019-01-31 DIAGNOSIS — H04.123 DRY EYE SYNDROME OF BILATERAL LACRIMAL GLANDS: ICD-10-CM

## 2019-01-31 PROCEDURE — 92014 COMPRE OPH EXAM EST PT 1/>: CPT

## 2019-03-07 ENCOUNTER — OUTPATIENT (OUTPATIENT)
Dept: OUTPATIENT SERVICES | Facility: HOSPITAL | Age: 74
LOS: 1 days | End: 2019-03-07
Payer: MEDICARE

## 2019-03-07 ENCOUNTER — APPOINTMENT (OUTPATIENT)
Dept: NEUROLOGY | Facility: HOSPITAL | Age: 74
End: 2019-03-07

## 2019-03-07 VITALS
BODY MASS INDEX: 21.51 KG/M2 | WEIGHT: 126 LBS | DIASTOLIC BLOOD PRESSURE: 83 MMHG | HEART RATE: 90 BPM | SYSTOLIC BLOOD PRESSURE: 132 MMHG | HEIGHT: 64 IN

## 2019-03-07 DIAGNOSIS — Z98.89 OTHER SPECIFIED POSTPROCEDURAL STATES: Chronic | ICD-10-CM

## 2019-03-07 DIAGNOSIS — R56.9 UNSPECIFIED CONVULSIONS: ICD-10-CM

## 2019-03-07 DIAGNOSIS — Z41.1 ENCOUNTER FOR COSMETIC SURGERY: Chronic | ICD-10-CM

## 2019-03-07 DIAGNOSIS — Z98.42 CATARACT EXTRACTION STATUS, LEFT EYE: Chronic | ICD-10-CM

## 2019-03-07 DIAGNOSIS — K11.7 DISTURBANCES OF SALIVARY SECRETION: ICD-10-CM

## 2019-03-07 DIAGNOSIS — G62.9 POLYNEUROPATHY, UNSPECIFIED: ICD-10-CM

## 2019-03-07 PROCEDURE — G0463: CPT

## 2019-03-07 RX ORDER — NORTRIPTYLINE HYDROCHLORIDE 50 MG/1
50 CAPSULE ORAL
Qty: 30 | Refills: 1 | Status: ACTIVE | COMMUNITY
Start: 2019-01-03 | End: 1900-01-01

## 2019-03-07 NOTE — ASSESSMENT
[FreeTextEntry1] : 73 year old female with almost 1 year old intermittent drooling throughout the day and numbness of B/L feet. Exam reveals diffuse proximal weakness throughout and sensory changes restricted to plantar surface of B/L feet without obvious signs of weakness of facial muscles. MRI Brain w/o is negative. MRA H/N notable for L ICA narrowing with distal Left M1 stenosis. EMG reveals peripheral neuropathy, repeat testing was negative for neuromuscular junction disorder such as myasthenia gravis. \par Myasthenia labs are WNL. Other causes of peripheral neuropathy: TSH, B12, folate, AFSHAN, SPEP/IPEP WNL \par \par PLAN:\par - GI referral given for drooling\par - Increase Pamelor to 50mg qhs \par - Script for walker given\par - Follow up in 3 months \par - Plan discussed with Dr. THOM Pérez.

## 2019-03-07 NOTE — HISTORY OF PRESENT ILLNESS
[FreeTextEntry1] : Interval history:\par Ms. Jose returns for follow up visit for drooling and peripheral neuropathy in B/L feet. She states she continues to have problems with drooling, unchanged. Occurring ~3x/day. Not getting worse but also no improvement. No urinary retention or side effects from Pamelor.\par \par Was told she has weak labial musculature on the left of her mouth by ENT. MRI Brain, MRA H/N unremarkable. EMG repeated showed no neuromuscular disease. Myasthenia labs were sent, all are negative. Labs sent on last visit - TSH, B12, folate, SPEP/IPEP are WNL.   \par \bassam Continues to have numbness in her toes bilaterally with some pain involved, although mild. Had EMG consistent with neuropathy in July. previously on Gabapentin, which we discontinued last visit. Started Nortriptyline 10mg qhs since last visit and feels it helps a little. Dose was increased to 25mg at night by PMD. \par \par Imaging:\par MRI Brain w/o, MRA Head/Neck reviewed. \par \par MRI brain negative for acute stroke/ICH. MRA notable for L ICA narrowing proximal to the petrous segment and supraclinoid ICAs with a distal L M1 stenosis. \par \par Past history:\par 74 YO F h/o B/L cataracts, HTN, HLD, Gout referred to neurology clinic for concern for TIA. She describes drooling, saliva coming from left side of mouth at times. Started 6 mo ago. Occurs 2-3/d. No facial droop. No blurry vision or diplopia. No dysarthria or word findings difficulty. Denies dysphagia but does note coughing sometimes with fluids only, not solids. No changes in taste/smell. Decreased hearing on R side x 1mo. No focal weakness of L arm or leg. Some intermittent numbness of L arm. No h/o migraines. At times gets bitemporal headache L>R, 2x/wk, mostly 6-7/10, at times goes to 9/10. Does not take meds for HAs. No migrainous features. \par Has episodes of confusion while driving lasting few minutes where she forgets where she is going but this quickly resolves on its own. \par \par From [place], lived in [place] for 15 yrs. Attends school everyday to learn English. Used to work as a teacher. Performs all ADLs independently, continues to drive. Lives at home with daughter. \par  \par

## 2019-03-07 NOTE — PHYSICAL EXAM
[Past-pointing] : there was no past-pointing [Tremor] : no tremor present [Coordination - Dysmetria Impaired Finger-to-Nose Bilateral] : not present [Plantar Reflex Right Only] : normal on the right [Plantar Reflex Left Only] : normal on the left [FreeTextEntry6] : 4-/5 in triceps, 4/5 UE's otherwise, 4/5 hip flexors, 5/5 distal LE's  [FreeTextEntry7] : decreased light touch on dorsal aspect of foot and 3rd toe b/l

## 2019-03-16 ENCOUNTER — RESULT REVIEW (OUTPATIENT)
Age: 74
End: 2019-03-16

## 2019-03-16 ENCOUNTER — INPATIENT (INPATIENT)
Facility: HOSPITAL | Age: 74
LOS: 9 days | DRG: 356 | End: 2019-03-26
Attending: HOSPITALIST | Admitting: HOSPITALIST
Payer: MEDICARE

## 2019-03-16 VITALS
RESPIRATION RATE: 16 BRPM | SYSTOLIC BLOOD PRESSURE: 124 MMHG | HEART RATE: 100 BPM | DIASTOLIC BLOOD PRESSURE: 61 MMHG | WEIGHT: 130.07 LBS | OXYGEN SATURATION: 99 % | TEMPERATURE: 97 F | HEIGHT: 62 IN

## 2019-03-16 DIAGNOSIS — Z41.1 ENCOUNTER FOR COSMETIC SURGERY: Chronic | ICD-10-CM

## 2019-03-16 DIAGNOSIS — I26.99 OTHER PULMONARY EMBOLISM WITHOUT ACUTE COR PULMONALE: ICD-10-CM

## 2019-03-16 DIAGNOSIS — Z98.89 OTHER SPECIFIED POSTPROCEDURAL STATES: Chronic | ICD-10-CM

## 2019-03-16 DIAGNOSIS — Z98.42 CATARACT EXTRACTION STATUS, LEFT EYE: Chronic | ICD-10-CM

## 2019-03-16 LAB
ALBUMIN SERPL ELPH-MCNC: 2.7 G/DL — LOW (ref 3.3–5)
ALBUMIN SERPL ELPH-MCNC: 3.6 G/DL — SIGNIFICANT CHANGE UP (ref 3.3–5)
ALP SERPL-CCNC: 116 U/L — SIGNIFICANT CHANGE UP (ref 40–120)
ALP SERPL-CCNC: 92 U/L — SIGNIFICANT CHANGE UP (ref 40–120)
ALT FLD-CCNC: 32 U/L — SIGNIFICANT CHANGE UP (ref 10–45)
ALT FLD-CCNC: 64 U/L — HIGH (ref 10–45)
ANION GAP SERPL CALC-SCNC: 17 MMOL/L — SIGNIFICANT CHANGE UP (ref 5–17)
ANION GAP SERPL CALC-SCNC: 25 MMOL/L — HIGH (ref 5–17)
APPEARANCE UR: CLEAR — SIGNIFICANT CHANGE UP
APTT BLD: 161.6 SEC — CRITICAL HIGH (ref 27.5–36.3)
APTT BLD: 24.9 SEC — LOW (ref 27.5–36.3)
AST SERPL-CCNC: 161 U/L — HIGH (ref 10–40)
AST SERPL-CCNC: 49 U/L — HIGH (ref 10–40)
BACTERIA # UR AUTO: NEGATIVE — SIGNIFICANT CHANGE UP
BASE EXCESS BLDV CALC-SCNC: -1.7 MMOL/L — SIGNIFICANT CHANGE UP (ref -2–2)
BASE EXCESS BLDV CALC-SCNC: -6.4 MMOL/L — LOW (ref -2–2)
BASOPHILS # BLD AUTO: 0 K/UL — SIGNIFICANT CHANGE UP (ref 0–0.2)
BASOPHILS NFR BLD AUTO: 0.1 % — SIGNIFICANT CHANGE UP (ref 0–2)
BILIRUB SERPL-MCNC: 0.7 MG/DL — SIGNIFICANT CHANGE UP (ref 0.2–1.2)
BILIRUB SERPL-MCNC: 0.8 MG/DL — SIGNIFICANT CHANGE UP (ref 0.2–1.2)
BILIRUB UR-MCNC: NEGATIVE — SIGNIFICANT CHANGE UP
BLD GP AB SCN SERPL QL: NEGATIVE — SIGNIFICANT CHANGE UP
BUN SERPL-MCNC: 42 MG/DL — HIGH (ref 7–23)
BUN SERPL-MCNC: 45 MG/DL — HIGH (ref 7–23)
CA-I SERPL-SCNC: 0.99 MMOL/L — LOW (ref 1.12–1.3)
CA-I SERPL-SCNC: 1.07 MMOL/L — LOW (ref 1.12–1.3)
CALCIUM SERPL-MCNC: 7.6 MG/DL — LOW (ref 8.4–10.5)
CALCIUM SERPL-MCNC: 8.6 MG/DL — SIGNIFICANT CHANGE UP (ref 8.4–10.5)
CHLORIDE BLDV-SCNC: 100 MMOL/L — SIGNIFICANT CHANGE UP (ref 96–108)
CHLORIDE BLDV-SCNC: 94 MMOL/L — LOW (ref 96–108)
CHLORIDE SERPL-SCNC: 87 MMOL/L — LOW (ref 96–108)
CHLORIDE SERPL-SCNC: 94 MMOL/L — LOW (ref 96–108)
CO2 BLDV-SCNC: 21 MMOL/L — LOW (ref 22–30)
CO2 BLDV-SCNC: 24 MMOL/L — SIGNIFICANT CHANGE UP (ref 22–30)
CO2 SERPL-SCNC: 15 MMOL/L — LOW (ref 22–31)
CO2 SERPL-SCNC: 16 MMOL/L — LOW (ref 22–31)
COLOR SPEC: SIGNIFICANT CHANGE UP
CREAT SERPL-MCNC: 1.12 MG/DL — SIGNIFICANT CHANGE UP (ref 0.5–1.3)
CREAT SERPL-MCNC: 1.12 MG/DL — SIGNIFICANT CHANGE UP (ref 0.5–1.3)
DIFF PNL FLD: ABNORMAL
EOSINOPHIL # BLD AUTO: 0.1 K/UL — SIGNIFICANT CHANGE UP (ref 0–0.5)
EOSINOPHIL NFR BLD AUTO: 0.6 % — SIGNIFICANT CHANGE UP (ref 0–6)
EPI CELLS # UR: 1 /HPF — SIGNIFICANT CHANGE UP
FLU A RESULT: SIGNIFICANT CHANGE UP
FLU A RESULT: SIGNIFICANT CHANGE UP
FLUAV AG NPH QL: SIGNIFICANT CHANGE UP
FLUBV AG NPH QL: SIGNIFICANT CHANGE UP
GAS PNL BLDA: SIGNIFICANT CHANGE UP
GAS PNL BLDV: 122 MMOL/L — LOW (ref 136–145)
GAS PNL BLDV: 123 MMOL/L — LOW (ref 136–145)
GAS PNL BLDV: SIGNIFICANT CHANGE UP
GLUCOSE BLDV-MCNC: 159 MG/DL — HIGH (ref 70–99)
GLUCOSE BLDV-MCNC: 230 MG/DL — HIGH (ref 70–99)
GLUCOSE SERPL-MCNC: 245 MG/DL — HIGH (ref 70–99)
GLUCOSE SERPL-MCNC: 298 MG/DL — HIGH (ref 70–99)
GLUCOSE UR QL: NEGATIVE — SIGNIFICANT CHANGE UP
HCO3 BLDV-SCNC: 20 MMOL/L — LOW (ref 21–29)
HCO3 BLDV-SCNC: 23 MMOL/L — SIGNIFICANT CHANGE UP (ref 21–29)
HCT VFR BLD CALC: 40.2 % — SIGNIFICANT CHANGE UP (ref 34.5–45)
HCT VFR BLD CALC: 40.2 % — SIGNIFICANT CHANGE UP (ref 34.5–45)
HCT VFR BLDA CALC: 31 % — LOW (ref 39–50)
HCT VFR BLDA CALC: 41 % — SIGNIFICANT CHANGE UP (ref 39–50)
HGB BLD CALC-MCNC: 10.1 G/DL — LOW (ref 11.5–15.5)
HGB BLD CALC-MCNC: 13.2 G/DL — SIGNIFICANT CHANGE UP (ref 11.5–15.5)
HGB BLD-MCNC: 13.3 G/DL — SIGNIFICANT CHANGE UP (ref 11.5–15.5)
HGB BLD-MCNC: 13.5 G/DL — SIGNIFICANT CHANGE UP (ref 11.5–15.5)
HYALINE CASTS # UR AUTO: 5 /LPF — HIGH (ref 0–2)
INR BLD: 1.5 RATIO — HIGH (ref 0.88–1.16)
INR BLD: 1.62 RATIO — HIGH (ref 0.88–1.16)
KETONES UR-MCNC: ABNORMAL
LACTATE BLDV-MCNC: 2.4 MMOL/L — HIGH (ref 0.7–2)
LACTATE BLDV-MCNC: 6.8 MMOL/L — CRITICAL HIGH (ref 0.7–2)
LDH SERPL L TO P-CCNC: 855 U/L — HIGH (ref 50–242)
LEUKOCYTE ESTERASE UR-ACNC: NEGATIVE — SIGNIFICANT CHANGE UP
LIDOCAIN IGE QN: 40 U/L — SIGNIFICANT CHANGE UP (ref 7–60)
LYMPHOCYTES # BLD AUTO: 0.7 K/UL — LOW (ref 1–3.3)
LYMPHOCYTES # BLD AUTO: 4.3 % — LOW (ref 13–44)
MCHC RBC-ENTMCNC: 29.7 PG — SIGNIFICANT CHANGE UP (ref 27–34)
MCHC RBC-ENTMCNC: 31.2 PG — SIGNIFICANT CHANGE UP (ref 27–34)
MCHC RBC-ENTMCNC: 33.1 GM/DL — SIGNIFICANT CHANGE UP (ref 32–36)
MCHC RBC-ENTMCNC: 33.5 GM/DL — SIGNIFICANT CHANGE UP (ref 32–36)
MCV RBC AUTO: 89.8 FL — SIGNIFICANT CHANGE UP (ref 80–100)
MCV RBC AUTO: 93 FL — SIGNIFICANT CHANGE UP (ref 80–100)
MONOCYTES # BLD AUTO: 0.7 K/UL — SIGNIFICANT CHANGE UP (ref 0–0.9)
MONOCYTES NFR BLD AUTO: 4 % — SIGNIFICANT CHANGE UP (ref 2–14)
NEUTROPHILS # BLD AUTO: 15.1 K/UL — HIGH (ref 1.8–7.4)
NEUTROPHILS NFR BLD AUTO: 91 % — HIGH (ref 43–77)
NITRITE UR-MCNC: NEGATIVE — SIGNIFICANT CHANGE UP
OTHER CELLS CSF MANUAL: 4 ML/DL — LOW (ref 18–22)
PCO2 BLDV: 41 MMHG — SIGNIFICANT CHANGE UP (ref 35–50)
PCO2 BLDV: 44 MMHG — SIGNIFICANT CHANGE UP (ref 35–50)
PH BLDV: 7.28 — LOW (ref 7.35–7.45)
PH BLDV: 7.37 — SIGNIFICANT CHANGE UP (ref 7.35–7.45)
PH UR: 6 — SIGNIFICANT CHANGE UP (ref 5–8)
PLATELET # BLD AUTO: 131 K/UL — LOW (ref 150–400)
PLATELET # BLD AUTO: 172 K/UL — SIGNIFICANT CHANGE UP (ref 150–400)
PO2 BLDV: 23 MMHG — LOW (ref 25–45)
PO2 BLDV: 25 MMHG — SIGNIFICANT CHANGE UP (ref 25–45)
POTASSIUM BLDV-SCNC: 4.4 MMOL/L — SIGNIFICANT CHANGE UP (ref 3.5–5.3)
POTASSIUM BLDV-SCNC: 5.2 MMOL/L — SIGNIFICANT CHANGE UP (ref 3.5–5.3)
POTASSIUM SERPL-MCNC: 4.8 MMOL/L — SIGNIFICANT CHANGE UP (ref 3.5–5.3)
POTASSIUM SERPL-MCNC: 5.7 MMOL/L — HIGH (ref 3.5–5.3)
POTASSIUM SERPL-SCNC: 4.8 MMOL/L — SIGNIFICANT CHANGE UP (ref 3.5–5.3)
POTASSIUM SERPL-SCNC: 5.7 MMOL/L — HIGH (ref 3.5–5.3)
PROT SERPL-MCNC: 5.1 G/DL — LOW (ref 6–8.3)
PROT SERPL-MCNC: 6.2 G/DL — SIGNIFICANT CHANGE UP (ref 6–8.3)
PROT UR-MCNC: ABNORMAL
PROTHROM AB SERPL-ACNC: 17.5 SEC — HIGH (ref 10–12.9)
PROTHROM AB SERPL-ACNC: 18.9 SEC — HIGH (ref 10–12.9)
RBC # BLD: 4.33 M/UL — SIGNIFICANT CHANGE UP (ref 3.8–5.2)
RBC # BLD: 4.47 M/UL — SIGNIFICANT CHANGE UP (ref 3.8–5.2)
RBC # FLD: 12.5 % — SIGNIFICANT CHANGE UP (ref 10.3–14.5)
RBC # FLD: 12.7 % — SIGNIFICANT CHANGE UP (ref 10.3–14.5)
RBC CASTS # UR COMP ASSIST: 6 /HPF — HIGH (ref 0–4)
RH IG SCN BLD-IMP: POSITIVE — SIGNIFICANT CHANGE UP
RSV RESULT: SIGNIFICANT CHANGE UP
RSV RNA RESP QL NAA+PROBE: SIGNIFICANT CHANGE UP
SAO2 % BLDV: 26 % — LOW (ref 67–88)
SAO2 % BLDV: 30 % — LOW (ref 67–88)
SODIUM SERPL-SCNC: 126 MMOL/L — LOW (ref 135–145)
SODIUM SERPL-SCNC: 128 MMOL/L — LOW (ref 135–145)
SP GR SPEC: >1.05 (ref 1.01–1.02)
TROPONIN T, HIGH SENSITIVITY RESULT: 33 NG/L — SIGNIFICANT CHANGE UP (ref 0–51)
TROPONIN T, HIGH SENSITIVITY RESULT: 36 NG/L — SIGNIFICANT CHANGE UP (ref 0–51)
UROBILINOGEN FLD QL: NEGATIVE — SIGNIFICANT CHANGE UP
WBC # BLD: 14.9 K/UL — HIGH (ref 3.8–10.5)
WBC # BLD: 16.5 K/UL — HIGH (ref 3.8–10.5)
WBC # FLD AUTO: 14.9 K/UL — HIGH (ref 3.8–10.5)
WBC # FLD AUTO: 16.5 K/UL — HIGH (ref 3.8–10.5)
WBC UR QL: 4 /HPF — SIGNIFICANT CHANGE UP (ref 0–5)

## 2019-03-16 PROCEDURE — 88342 IMHCHEM/IMCYTCHM 1ST ANTB: CPT | Mod: 26,59

## 2019-03-16 PROCEDURE — 31500 INSERT EMERGENCY AIRWAY: CPT

## 2019-03-16 PROCEDURE — 99291 CRITICAL CARE FIRST HOUR: CPT

## 2019-03-16 PROCEDURE — 71045 X-RAY EXAM CHEST 1 VIEW: CPT | Mod: 26,76

## 2019-03-16 PROCEDURE — 70450 CT HEAD/BRAIN W/O DYE: CPT | Mod: 26

## 2019-03-16 PROCEDURE — 88305 TISSUE EXAM BY PATHOLOGIST: CPT | Mod: 26

## 2019-03-16 PROCEDURE — 88341 IMHCHEM/IMCYTCHM EA ADD ANTB: CPT | Mod: 26,59

## 2019-03-16 PROCEDURE — 99291 CRITICAL CARE FIRST HOUR: CPT | Mod: 25

## 2019-03-16 PROCEDURE — 71275 CT ANGIOGRAPHY CHEST: CPT | Mod: 26

## 2019-03-16 PROCEDURE — 88360 TUMOR IMMUNOHISTOCHEM/MANUAL: CPT | Mod: 26

## 2019-03-16 RX ORDER — HEPARIN SODIUM 5000 [USP'U]/ML
4500 INJECTION INTRAVENOUS; SUBCUTANEOUS EVERY 6 HOURS
Qty: 0 | Refills: 0 | Status: DISCONTINUED | OUTPATIENT
Start: 2019-03-16 | End: 2019-03-16

## 2019-03-16 RX ORDER — FENTANYL CITRATE 50 UG/ML
0.5 INJECTION INTRAVENOUS
Qty: 5000 | Refills: 0 | Status: DISCONTINUED | OUTPATIENT
Start: 2019-03-16 | End: 2019-03-18

## 2019-03-16 RX ORDER — HEPARIN SODIUM 5000 [USP'U]/ML
INJECTION INTRAVENOUS; SUBCUTANEOUS
Qty: 25000 | Refills: 0 | Status: DISCONTINUED | OUTPATIENT
Start: 2019-03-16 | End: 2019-03-16

## 2019-03-16 RX ORDER — MIDAZOLAM HYDROCHLORIDE 1 MG/ML
0.02 INJECTION, SOLUTION INTRAMUSCULAR; INTRAVENOUS
Qty: 100 | Refills: 0 | Status: DISCONTINUED | OUTPATIENT
Start: 2019-03-16 | End: 2019-03-18

## 2019-03-16 RX ORDER — PANTOPRAZOLE SODIUM 20 MG/1
8 TABLET, DELAYED RELEASE ORAL
Qty: 80 | Refills: 0 | Status: DISCONTINUED | OUTPATIENT
Start: 2019-03-16 | End: 2019-03-17

## 2019-03-16 RX ORDER — HEPARIN SODIUM 5000 [USP'U]/ML
4500 INJECTION INTRAVENOUS; SUBCUTANEOUS ONCE
Qty: 0 | Refills: 0 | Status: COMPLETED | OUTPATIENT
Start: 2019-03-16 | End: 2019-03-16

## 2019-03-16 RX ORDER — OCTREOTIDE ACETATE 200 UG/ML
50 INJECTION, SOLUTION INTRAVENOUS; SUBCUTANEOUS ONCE
Qty: 0 | Refills: 0 | Status: COMPLETED | OUTPATIENT
Start: 2019-03-16 | End: 2019-03-16

## 2019-03-16 RX ORDER — ALBUMIN HUMAN 25 %
100 VIAL (ML) INTRAVENOUS EVERY 6 HOURS
Qty: 0 | Refills: 0 | Status: DISCONTINUED | OUTPATIENT
Start: 2019-03-16 | End: 2019-03-17

## 2019-03-16 RX ORDER — PIPERACILLIN AND TAZOBACTAM 4; .5 G/20ML; G/20ML
3.38 INJECTION, POWDER, LYOPHILIZED, FOR SOLUTION INTRAVENOUS ONCE
Qty: 0 | Refills: 0 | Status: COMPLETED | OUTPATIENT
Start: 2019-03-16 | End: 2019-03-16

## 2019-03-16 RX ORDER — PROTAMINE SULFATE 10 MG/ML
50 AMPUL (ML) INTRAVENOUS ONCE
Qty: 0 | Refills: 0 | Status: COMPLETED | OUTPATIENT
Start: 2019-03-16 | End: 2019-03-16

## 2019-03-16 RX ORDER — OCTREOTIDE ACETATE 200 UG/ML
50 INJECTION, SOLUTION INTRAVENOUS; SUBCUTANEOUS
Qty: 500 | Refills: 0 | Status: DISCONTINUED | OUTPATIENT
Start: 2019-03-16 | End: 2019-03-17

## 2019-03-16 RX ORDER — SODIUM CHLORIDE 9 MG/ML
1850 INJECTION INTRAMUSCULAR; INTRAVENOUS; SUBCUTANEOUS ONCE
Qty: 0 | Refills: 0 | Status: DISCONTINUED | OUTPATIENT
Start: 2019-03-16 | End: 2019-03-16

## 2019-03-16 RX ORDER — VANCOMYCIN HCL 1 G
1000 VIAL (EA) INTRAVENOUS ONCE
Qty: 0 | Refills: 0 | Status: COMPLETED | OUTPATIENT
Start: 2019-03-16 | End: 2019-03-16

## 2019-03-16 RX ORDER — FENTANYL CITRATE 50 UG/ML
100 INJECTION INTRAVENOUS ONCE
Qty: 0 | Refills: 0 | Status: DISCONTINUED | OUTPATIENT
Start: 2019-03-16 | End: 2019-03-16

## 2019-03-16 RX ORDER — PROTAMINE SULFATE 10 MG/ML
67 AMPUL (ML) INTRAVENOUS ONCE
Qty: 0 | Refills: 0 | Status: DISCONTINUED | OUTPATIENT
Start: 2019-03-16 | End: 2019-03-16

## 2019-03-16 RX ORDER — PANTOPRAZOLE SODIUM 20 MG/1
80 TABLET, DELAYED RELEASE ORAL ONCE
Qty: 0 | Refills: 0 | Status: COMPLETED | OUTPATIENT
Start: 2019-03-16 | End: 2019-03-16

## 2019-03-16 RX ORDER — HEPARIN SODIUM 5000 [USP'U]/ML
2000 INJECTION INTRAVENOUS; SUBCUTANEOUS EVERY 6 HOURS
Qty: 0 | Refills: 0 | Status: DISCONTINUED | OUTPATIENT
Start: 2019-03-16 | End: 2019-03-16

## 2019-03-16 RX ORDER — PHYTONADIONE (VIT K1) 5 MG
10 TABLET ORAL ONCE
Qty: 0 | Refills: 0 | Status: COMPLETED | OUTPATIENT
Start: 2019-03-16 | End: 2019-03-16

## 2019-03-16 RX ORDER — SODIUM CHLORIDE 9 MG/ML
2000 INJECTION INTRAMUSCULAR; INTRAVENOUS; SUBCUTANEOUS ONCE
Qty: 0 | Refills: 0 | Status: COMPLETED | OUTPATIENT
Start: 2019-03-16 | End: 2019-03-16

## 2019-03-16 RX ORDER — ETOMIDATE 2 MG/ML
20 INJECTION INTRAVENOUS ONCE
Qty: 0 | Refills: 0 | Status: COMPLETED | OUTPATIENT
Start: 2019-03-16 | End: 2019-03-16

## 2019-03-16 RX ORDER — PROPOFOL 10 MG/ML
10 INJECTION, EMULSION INTRAVENOUS
Qty: 1000 | Refills: 0 | Status: DISCONTINUED | OUTPATIENT
Start: 2019-03-16 | End: 2019-03-16

## 2019-03-16 RX ORDER — ROCURONIUM BROMIDE 10 MG/ML
60 VIAL (ML) INTRAVENOUS ONCE
Qty: 0 | Refills: 0 | Status: COMPLETED | OUTPATIENT
Start: 2019-03-16 | End: 2019-03-16

## 2019-03-16 RX ORDER — METOCLOPRAMIDE HCL 10 MG
10 TABLET ORAL ONCE
Qty: 0 | Refills: 0 | Status: COMPLETED | OUTPATIENT
Start: 2019-03-16 | End: 2019-03-16

## 2019-03-16 RX ORDER — CALCIUM GLUCONATE 100 MG/ML
2 VIAL (ML) INTRAVENOUS ONCE
Qty: 0 | Refills: 0 | Status: COMPLETED | OUTPATIENT
Start: 2019-03-16 | End: 2019-03-16

## 2019-03-16 RX ADMIN — FENTANYL CITRATE 1.42 MICROGRAM(S)/KG/HR: 50 INJECTION INTRAVENOUS at 23:02

## 2019-03-16 RX ADMIN — MIDAZOLAM HYDROCHLORIDE 1.14 MG/KG/HR: 1 INJECTION, SOLUTION INTRAMUSCULAR; INTRAVENOUS at 20:44

## 2019-03-16 RX ADMIN — Medication 200 GRAM(S): at 23:02

## 2019-03-16 RX ADMIN — PANTOPRAZOLE SODIUM 10 MG/HR: 20 TABLET, DELAYED RELEASE ORAL at 20:44

## 2019-03-16 RX ADMIN — Medication 60 MILLIGRAM(S): at 19:03

## 2019-03-16 RX ADMIN — SODIUM CHLORIDE 4000 MILLILITER(S): 9 INJECTION INTRAMUSCULAR; INTRAVENOUS; SUBCUTANEOUS at 15:31

## 2019-03-16 RX ADMIN — PIPERACILLIN AND TAZOBACTAM 200 GRAM(S): 4; .5 INJECTION, POWDER, LYOPHILIZED, FOR SOLUTION INTRAVENOUS at 15:31

## 2019-03-16 RX ADMIN — Medication 220 MILLIGRAM(S): at 20:15

## 2019-03-16 RX ADMIN — ETOMIDATE 20 MILLIGRAM(S): 2 INJECTION INTRAVENOUS at 19:03

## 2019-03-16 RX ADMIN — FENTANYL CITRATE 100 MICROGRAM(S): 50 INJECTION INTRAVENOUS at 19:31

## 2019-03-16 RX ADMIN — HEPARIN SODIUM 1100 UNIT(S)/HR: 5000 INJECTION INTRAVENOUS; SUBCUTANEOUS at 16:34

## 2019-03-16 RX ADMIN — OCTREOTIDE ACETATE 50 MICROGRAM(S): 200 INJECTION, SOLUTION INTRAVENOUS; SUBCUTANEOUS at 19:32

## 2019-03-16 RX ADMIN — FENTANYL CITRATE 1.42 MICROGRAM(S)/KG/HR: 50 INJECTION INTRAVENOUS at 20:43

## 2019-03-16 RX ADMIN — Medication 102 MILLIGRAM(S): at 21:55

## 2019-03-16 RX ADMIN — PANTOPRAZOLE SODIUM 10 MG/HR: 20 TABLET, DELAYED RELEASE ORAL at 23:03

## 2019-03-16 RX ADMIN — PANTOPRAZOLE SODIUM 80 MILLIGRAM(S): 20 TABLET, DELAYED RELEASE ORAL at 20:42

## 2019-03-16 RX ADMIN — OCTREOTIDE ACETATE 10 MICROGRAM(S)/HR: 200 INJECTION, SOLUTION INTRAVENOUS; SUBCUTANEOUS at 23:02

## 2019-03-16 RX ADMIN — Medication 250 MILLIGRAM(S): at 17:51

## 2019-03-16 RX ADMIN — OCTREOTIDE ACETATE 10 MICROGRAM(S)/HR: 200 INJECTION, SOLUTION INTRAVENOUS; SUBCUTANEOUS at 20:45

## 2019-03-16 RX ADMIN — MIDAZOLAM HYDROCHLORIDE 1.14 MG/KG/HR: 1 INJECTION, SOLUTION INTRAMUSCULAR; INTRAVENOUS at 23:02

## 2019-03-16 RX ADMIN — Medication 10 MILLIGRAM(S): at 21:07

## 2019-03-16 RX ADMIN — HEPARIN SODIUM 4500 UNIT(S): 5000 INJECTION INTRAVENOUS; SUBCUTANEOUS at 16:33

## 2019-03-16 RX ADMIN — Medication 50 MILLILITER(S): at 23:27

## 2019-03-16 NOTE — H&P ADULT - NSHPSOCIALHISTORY_GEN_ALL_CORE
Smoking: former smoker   EtOH Use: denied etoh  Drugs: no drugs   Occupation: Retired teacher   Recent Travel: [facility] [place]  Country of Birth:  Advance Directives: Smoking: former smoker   EtOH Use: denied etoh  Drugs: no drugs   Occupation: Retired teacher   [place] [facility] [place]  Country of Birth:  Advance Directives: FULL CODE; surrogate decision maker is daughter

## 2019-03-16 NOTE — ED PROCEDURE NOTE - ATTENDING CONTRIBUTION TO CARE
Attending MD Meeks:  I was present during the key portions of the procedure.
Attending MD Meeks: I was present during the key portions of the procedure.

## 2019-03-16 NOTE — ED ADULT NURSE REASSESSMENT NOTE - NS ED NURSE REASSESS COMMENT FT1
Pt given 20 mg etomidate and 60 mg of nikita given by MD Whitt. Successfully intubated with a 7.5 et tube and 23 at the lip line

## 2019-03-16 NOTE — H&P ADULT - HISTORY OF PRESENT ILLNESS
73 F w/ pmh former smoker HTN, HLD, RA (colchicine), peripheral neuropathy who presents for decreased appetite, generalized weakness and confusion for 3 days. Patient is a clinic patient at [address] and follow up with Dr. Lema in [facility]. Patient had a colonoscopy in dec/2018. Patient also recently returned from [place] 2/18/19. Patient is denied any chest pain, abdominal pain, sob, fever.  Patient is AxO2, no complaints. She has a history of hepatitis A, B and C were negative.     In the ED she had hematemesis and was intubated for airway protection. Transferred to the MICU for further care and management.

## 2019-03-16 NOTE — ED PROVIDER NOTE - CARE PLAN
Principal Discharge DX:	Pulmonary embolism Principal Discharge DX:	Pulmonary embolism  Secondary Diagnosis:	Acute respiratory failure with hypoxia Principal Discharge DX:	Pulmonary embolism  Secondary Diagnosis:	Acute respiratory failure with hypoxia  Secondary Diagnosis:	Acute upper GI bleeding

## 2019-03-16 NOTE — ED ADULT NURSE NOTE - OBJECTIVE STATEMENT
Pt is a 72 yo female bib family for increased AMS, lethargy and abdominal distention. As per daughter pt flew home from [place] 1 month ago and has not been acting like herself since. Pts daughter states that she hasn't gotten out of bed in the past four days, Pt denies any CP, no N/V/D, Pt has not had a bowel movement in 3 days. decreased PO intake. Pt is A&Ox2. Her O2 sat is 80 on RA, she denies any diff breathing, placed on NRB,

## 2019-03-16 NOTE — CONSULT NOTE ADULT - SUBJECTIVE AND OBJECTIVE BOX
Chief Complaint:  Patient is a 73y old  Female who presents with a chief complaint of hematemesis (16 Mar 2019 19:20)      HPI:  73 year old female with COPD (former smoker), HTN, HLD, RA (on colchicine), peripheral neuropathy who presented to the emergency room with chief complain of decreased appetite, generalized weakness and confusion for 3 days.       In the ED she had hematemesis and was intubated for airway protection.       GI Doctor: [facility]  Last colonoscopy: 12.19.2018  Last endoscopy: never       Allergies:  No Known Allergies      Home Medications:    Hospital Medications:  fentaNYL    Injectable 100 MICROGram(s) IV Push once  fentaNYL   Infusion. 0.5 MICROgram(s)/kG/Hr IV Continuous <Continuous>  midazolam Infusion 0.02 mG/kG/Hr IV Continuous <Continuous>  octreotide  Infusion 50 MICROgram(s)/Hr IV Continuous <Continuous>  pantoprazole  Injectable 80 milliGRAM(s) IV Push once  pantoprazole Infusion 8 mG/Hr IV Continuous <Continuous>  protamine  IVPB 50 milliGRAM(s) IV Intermittent once      PMHX/PSHX:  Raynauds disease  GERD (gastroesophageal reflux disease)  HLD (hyperlipidemia)  HTN (hypertension)  COPD (chronic obstructive pulmonary disease)  S/P left cataract extraction  History of tonsillectomy  History of rhinoplasty  History of hernia repair      Family history:  No pertinent family history in first degree relatives      Social History:     ROS:     General:  No wt loss, fevers, chills, night sweats, fatigue,   Eyes:  Good vision, no reported pain  ENT:  No sore throat, pain, runny nose, dysphagia  CV:  No pain, palpitations, hypo/hypertension  Resp:  No dyspnea, cough, tachypnea, wheezing  GI:  See HPI  :  No pain, bleeding, incontinence, nocturia  Muscle:  No pain, weakness  Neuro:  No weakness, tingling, memory problems  Psych:  No fatigue, insomnia, mood problems, depression  Endocrine:  No polyuria, polydipsia, cold/heat intolerance  Heme:  No petechiae, ecchymosis, easy bruisability  Skin:  No rash, edema      PHYSICAL EXAM:     GENERAL:  Appears stated age, well-groomed, well-nourished, no distress  HEENT:  NC/AT,  conjunctivae clear and pink,  no JVD  CHEST:  Full & symmetric excursion, no increased effort, breath sounds clear  HEART:  Regular rhythm, S1, S2, no murmur/rub/S3/S4, no abdominal bruit, no edema  ABDOMEN:  Soft, non-tender, non-distended, normoactive bowel sounds,  no masses ,  EXTREMITIES:  no cyanosis,clubbing or edema  SKIN:  No rash/erythema/ecchymoses/petechiae/wounds/abscess/warm/dry  NEURO:  Alert, oriented    Vital Signs:  Vital Signs Last 24 Hrs  T(C): 36.2 (16 Mar 2019 13:28), Max: 36.2 (16 Mar 2019 12:10)  T(F): 97.1 (16 Mar 2019 13:28), Max: 97.1 (16 Mar 2019 12:10)  HR: 97 (16 Mar 2019 19:29) (82 - 117)  BP: 146/97 (16 Mar 2019 19:10) (124/61 - 185/76)  BP(mean): --  RR: 18 (16 Mar 2019 19:10) (16 - 26)  SpO2: 100% (16 Mar 2019 19:29) (84% - 100%)  Daily Height in cm: 157.48 (16 Mar 2019 12:10)    Daily     LABS:                        13.5   16.5  )-----------( 172      ( 16 Mar 2019 14:14 )             40.2     03-16    128<L>  |  87<L>  |  45<H>  ----------------------------<  245<H>  4.8   |  16<L>  |  1.12    Ca    8.6      16 Mar 2019 14:14    TPro  6.2  /  Alb  3.6  /  TBili  0.7  /  DBili  x   /  AST  49<H>  /  ALT  32  /  AlkPhos  116  03-16    LIVER FUNCTIONS - ( 16 Mar 2019 14:14 )  Alb: 3.6 g/dL / Pro: 6.2 g/dL / ALK PHOS: 116 U/L / ALT: 32 U/L / AST: 49 U/L / GGT: x           PT/INR - ( 16 Mar 2019 14:14 )   PT: 17.5 sec;   INR: 1.50 ratio         PTT - ( 16 Mar 2019 14:14 )  PTT:24.9 sec    Amylase Serum--      Lipase serum40       Ammonia--      Imaging:    < from: CT Angio Chest w/ IV Cont (03.16.19 @ 15:53) >    FINDINGS:    CHEST:     LUNGS AND LARGE AIRWAYS: Patent central airways.  6 mm left lower lobe and 5 mm right upper lobe calcified granulomas, decreased in size from 11/5/2012, favoring benign etiology.  PLEURA: No pleural effusion.  VESSELS: There is acute pulmonary emboli involvingthe bilateral main   pulmonary arteries extending into the lobar, segmental and subsegmental pulmonary arteries of the right upper lobe, right lower lobe, right middle lobe, left lower lobe and left upper lobe. Atherosclerotic ulcerations of the thoracic aorta and coronary arteries.  HEART: Heart size is normal. No pericardial effusion. There is mild straightening of the intraventricular septum with reflux of contrast into   the IVC concerning for right heart strain.  MEDIASTINUM AND SHANELL: No lymphadenopathy.  CHEST WALL AND LOWER NECK: Within normal limits.  VISUALIZED UPPER ABDOMEN: Cirrhotic morphology with innumerable hypodense hepatic nodules. Cholelithiasis. Moderate volume ascites with peritoneal nodularity concerning for peritoneal carcinomatosis. Subcentimeter   hypodense renal lesions, too small to characterize. Anasarca along the imaged abdominal wall.  BONES: Multilevel spinal degenerative changes.    IMPRESSION:   Extensive bilateral pulmonary emboli involving the lobar, segmental and subsegmental pulmonary arteries.    Straightening of the intraventricular septum and reflux of contrast into the IVC concerning for right heart strain. Correlate with echocardiography.    Cirrhotic liver with innumerable hypodense nodules concerning for metastatic disease. Moderate volume ascites with peritoneal nodularity concerning for peritoneal carcinomatosis. Subcentimeter hypodense renal lesions, too small to characterize. Follow-up pending CT abdomen pelvis with intravenous contrast.    < end of copied text >      < from: Colonoscopy (11.04.15 @ 11:34) >  Impression:          - External and internal hemorrhoids.      - One 6 mm polyp at the hepatic flexure. Resected and retrieved.                       - Two 3 mm polyps in the cecum. Resected and retrieved.                       - Four 3 to 5 mm polyps in the ascending colon. Resected and retrieved.              - Diverticulosis in the sigmoid colon.                       - The examined portion of the ileum was normal.  Recommendation:      - Return to GI clinic in 1 month to follow up pathology results.                       - Repeat colonoscopyin 3 years for surveillance.    < end of copied text >    Surgical Pathology Report (12.19.18 @ 15:09)  Final Diagnosis    1. Cecum, polyp, biopsy  - Colonic mucosa with focal hyperplastic change. See noteNote: Deeper levels are also reviewed.  2. Transverse colon, polyp, biopsy  - Fragments of tubular adenoma  3. Rectum, polyp, biopsy  - Hyperplastic polyp    Verified by: Gabriela Stewart M.D.  (Electronic Signature)  Reported on: 12/21/18 15:30 EST, [address] [place] [address]  _________________________________________________________________    Clinical History  colon polyps    Specimen(s) Submitted  1     Cecal polyp  2     Transverse colon polyp  3     Rectal polyp Chief Complaint:  Patient is a 73y old  Female who presents with a chief complaint of hematemesis (16 Mar 2019 19:20)      HPI:  73 year old female with COPD (former smoker), HTN, HLD, RA (on colchicine), peripheral neuropathy who presented to the emergency room with chief complain of decreased appetite, generalized weakness and confusion for 3 days.     She was found to have PEs and thus was started on a heparin drip. She subsequently developed hematemesis and was emergently intubated for airway protection. GI was consulted for further evaluation of the hematemesis. Family including her daughter were at bedside.     GI Doctor: [facility]  Last colonoscopy: 12.19.2018  Last endoscopy: never       Allergies:  No Known Allergies      Home Medications:    Hospital Medications:  fentaNYL    Injectable 100 MICROGram(s) IV Push once  fentaNYL   Infusion. 0.5 MICROgram(s)/kG/Hr IV Continuous <Continuous>  midazolam Infusion 0.02 mG/kG/Hr IV Continuous <Continuous>  octreotide  Infusion 50 MICROgram(s)/Hr IV Continuous <Continuous>  pantoprazole  Injectable 80 milliGRAM(s) IV Push once  pantoprazole Infusion 8 mG/Hr IV Continuous <Continuous>  protamine  IVPB 50 milliGRAM(s) IV Intermittent once      PMHX/PSHX:  Raynauds disease  GERD (gastroesophageal reflux disease)  HLD (hyperlipidemia)  HTN (hypertension)  COPD (chronic obstructive pulmonary disease)  S/P left cataract extraction  History of tonsillectomy  History of rhinoplasty  History of hernia repair      Family history:  No pertinent family history in first degree relatives      Social History:   Former smoker    ROS: Unable to assess as she is intubated and sedated     PHYSICAL EXAM:     GENERAL:  Appears stated age  HEENT:  NC/AT  CHEST:  intubated   HEART:  Regular rhythm, S1, S2+  ABDOMEN:  Soft, non-tender, non-distended  EXTREMITIES:  no edema  SKIN:  skin rash+, large black discoloration on the right arm   NEURO: sedated     Vital Signs:  Vital Signs Last 24 Hrs  T(C): 36.2 (16 Mar 2019 13:28), Max: 36.2 (16 Mar 2019 12:10)  T(F): 97.1 (16 Mar 2019 13:28), Max: 97.1 (16 Mar 2019 12:10)  HR: 97 (16 Mar 2019 19:29) (82 - 117)  BP: 146/97 (16 Mar 2019 19:10) (124/61 - 185/76)  BP(mean): --  RR: 18 (16 Mar 2019 19:10) (16 - 26)  SpO2: 100% (16 Mar 2019 19:29) (84% - 100%)  Daily Height in cm: 157.48 (16 Mar 2019 12:10)    Daily     LABS:                        13.5   16.5  )-----------( 172      ( 16 Mar 2019 14:14 )             40.2     03-16    128<L>  |  87<L>  |  45<H>  ----------------------------<  245<H>  4.8   |  16<L>  |  1.12    Ca    8.6      16 Mar 2019 14:14    TPro  6.2  /  Alb  3.6  /  TBili  0.7  /  DBili  x   /  AST  49<H>  /  ALT  32  /  AlkPhos  116  03-16    LIVER FUNCTIONS - ( 16 Mar 2019 14:14 )  Alb: 3.6 g/dL / Pro: 6.2 g/dL / ALK PHOS: 116 U/L / ALT: 32 U/L / AST: 49 U/L / GGT: x           PT/INR - ( 16 Mar 2019 14:14 )   PT: 17.5 sec;   INR: 1.50 ratio         PTT - ( 16 Mar 2019 14:14 )  PTT:24.9 sec    Amylase Serum--      Lipase serum40       Ammonia--      Imaging:    < from: CT Angio Chest w/ IV Cont (03.16.19 @ 15:53) >    FINDINGS:    CHEST:     LUNGS AND LARGE AIRWAYS: Patent central airways.  6 mm left lower lobe and 5 mm right upper lobe calcified granulomas, decreased in size from 11/5/2012, favoring benign etiology.  PLEURA: No pleural effusion.  VESSELS: There is acute pulmonary emboli involvingthe bilateral main   pulmonary arteries extending into the lobar, segmental and subsegmental pulmonary arteries of the right upper lobe, right lower lobe, right middle lobe, left lower lobe and left upper lobe. Atherosclerotic ulcerations of the thoracic aorta and coronary arteries.  HEART: Heart size is normal. No pericardial effusion. There is mild straightening of the intraventricular septum with reflux of contrast into   the IVC concerning for right heart strain.  MEDIASTINUM AND SHANELL: No lymphadenopathy.  CHEST WALL AND LOWER NECK: Within normal limits.  VISUALIZED UPPER ABDOMEN: Cirrhotic morphology with innumerable hypodense hepatic nodules. Cholelithiasis. Moderate volume ascites with peritoneal nodularity concerning for peritoneal carcinomatosis. Subcentimeter   hypodense renal lesions, too small to characterize. Anasarca along the imaged abdominal wall.  BONES: Multilevel spinal degenerative changes.    IMPRESSION:   Extensive bilateral pulmonary emboli involving the lobar, segmental and subsegmental pulmonary arteries.    Straightening of the intraventricular septum and reflux of contrast into the IVC concerning for right heart strain. Correlate with echocardiography.    Cirrhotic liver with innumerable hypodense nodules concerning for metastatic disease. Moderate volume ascites with peritoneal nodularity concerning for peritoneal carcinomatosis. Subcentimeter hypodense renal lesions, too small to characterize. Follow-up pending CT abdomen pelvis with intravenous contrast.    < end of copied text >      < from: Colonoscopy (11.04.15 @ 11:34) >  Impression:          - External and internal hemorrhoids.      - One 6 mm polyp at the hepatic flexure. Resected and retrieved.                       - Two 3 mm polyps in the cecum. Resected and retrieved.                       - Four 3 to 5 mm polyps in the ascending colon. Resected and retrieved.              - Diverticulosis in the sigmoid colon.                       - The examined portion of the ileum was normal.  Recommendation:      - Return to GI clinic in 1 month to follow up pathology results.                       - Repeat colonoscopyin 3 years for surveillance.    < end of copied text >    Surgical Pathology Report (12.19.18 @ 15:09)  Final Diagnosis    1. Cecum, polyp, biopsy  - Colonic mucosa with focal hyperplastic change. See noteNote: Deeper levels are also reviewed.  2. Transverse colon, polyp, biopsy  - Fragments of tubular adenoma  3. Rectum, polyp, biopsy  - Hyperplastic polyp    Verified by: Gabriela Stewart M.D.  (Electronic Signature)  Reported on: 12/21/18 15:30 EST, [address] [place] [address], [place]  [address]  _________________________________________________________________    Clinical History  colon polyps    Specimen(s) Submitted  1     Cecal polyp  2     Transverse colon polyp  3     Rectal polyp Chief Complaint:  Patient is a 73y old  Female who presents with a chief complaint of hematemesis (16 Mar 2019 19:20)      HPI:  73 year old female with COPD (former smoker), HTN, HLD, RA (on colchicine), peripheral neuropathy who presented to the emergency room with chief complain of decreased appetite, generalized weakness and confusion for 3 days.     She was found to have PEs and thus was started on a heparin drip. She subsequently developed hematemesis and was emergently intubated for airway protection. GI was consulted for further evaluation of the hematemesis. Family including her daughter were at bedside. Other than weakness recently, she had no abdominal pain or melena/hematochezia.    GI Doctor: [facility]  Last colonoscopy: 12.19.2018  Last endoscopy: never       Allergies:  No Known Allergies      Home Medications:    Hospital Medications:  fentaNYL    Injectable 100 MICROGram(s) IV Push once  fentaNYL   Infusion. 0.5 MICROgram(s)/kG/Hr IV Continuous <Continuous>  midazolam Infusion 0.02 mG/kG/Hr IV Continuous <Continuous>  octreotide  Infusion 50 MICROgram(s)/Hr IV Continuous <Continuous>  pantoprazole  Injectable 80 milliGRAM(s) IV Push once  pantoprazole Infusion 8 mG/Hr IV Continuous <Continuous>  protamine  IVPB 50 milliGRAM(s) IV Intermittent once      PMHX/PSHX:  Raynauds disease  GERD (gastroesophageal reflux disease)  HLD (hyperlipidemia)  HTN (hypertension)  COPD (chronic obstructive pulmonary disease)  S/P left cataract extraction  History of tonsillectomy  History of rhinoplasty  History of hernia repair      Family history:  No pertinent family history in first degree relatives      Social History:   Former smoker    ROS: Unable to assess as she is intubated and sedated     PHYSICAL EXAM:     GENERAL:  Appears stated age  HEENT:  NC/AT  CHEST:  intubated   HEART:  Regular rhythm, S1, S2+  ABDOMEN:  Soft, non-tender, non-distended  EXTREMITIES:  no edema  SKIN:  skin rash+, large black discoloration on the right arm   NEURO: sedated     Vital Signs:  Vital Signs Last 24 Hrs  T(C): 36.2 (16 Mar 2019 13:28), Max: 36.2 (16 Mar 2019 12:10)  T(F): 97.1 (16 Mar 2019 13:28), Max: 97.1 (16 Mar 2019 12:10)  HR: 97 (16 Mar 2019 19:29) (82 - 117)  BP: 146/97 (16 Mar 2019 19:10) (124/61 - 185/76)  BP(mean): --  RR: 18 (16 Mar 2019 19:10) (16 - 26)  SpO2: 100% (16 Mar 2019 19:29) (84% - 100%)  Daily Height in cm: 157.48 (16 Mar 2019 12:10)    Daily     LABS:                        13.5   16.5  )-----------( 172      ( 16 Mar 2019 14:14 )             40.2     03-16    128<L>  |  87<L>  |  45<H>  ----------------------------<  245<H>  4.8   |  16<L>  |  1.12    Ca    8.6      16 Mar 2019 14:14    TPro  6.2  /  Alb  3.6  /  TBili  0.7  /  DBili  x   /  AST  49<H>  /  ALT  32  /  AlkPhos  116  03-16    LIVER FUNCTIONS - ( 16 Mar 2019 14:14 )  Alb: 3.6 g/dL / Pro: 6.2 g/dL / ALK PHOS: 116 U/L / ALT: 32 U/L / AST: 49 U/L / GGT: x           PT/INR - ( 16 Mar 2019 14:14 )   PT: 17.5 sec;   INR: 1.50 ratio         PTT - ( 16 Mar 2019 14:14 )  PTT:24.9 sec    Amylase Serum--      Lipase serum40       Ammonia--      Imaging:    < from: CT Angio Chest w/ IV Cont (03.16.19 @ 15:53) >    FINDINGS:    CHEST:     LUNGS AND LARGE AIRWAYS: Patent central airways.  6 mm left lower lobe and 5 mm right upper lobe calcified granulomas, decreased in size from 11/5/2012, favoring benign etiology.  PLEURA: No pleural effusion.  VESSELS: There is acute pulmonary emboli involvingthe bilateral main   pulmonary arteries extending into the lobar, segmental and subsegmental pulmonary arteries of the right upper lobe, right lower lobe, right middle lobe, left lower lobe and left upper lobe. Atherosclerotic ulcerations of the thoracic aorta and coronary arteries.  HEART: Heart size is normal. No pericardial effusion. There is mild straightening of the intraventricular septum with reflux of contrast into   the IVC concerning for right heart strain.  MEDIASTINUM AND SHANELL: No lymphadenopathy.  CHEST WALL AND LOWER NECK: Within normal limits.  VISUALIZED UPPER ABDOMEN: Cirrhotic morphology with innumerable hypodense hepatic nodules. Cholelithiasis. Moderate volume ascites with peritoneal nodularity concerning for peritoneal carcinomatosis. Subcentimeter   hypodense renal lesions, too small to characterize. Anasarca along the imaged abdominal wall.  BONES: Multilevel spinal degenerative changes.    IMPRESSION:   Extensive bilateral pulmonary emboli involving the lobar, segmental and subsegmental pulmonary arteries.    Straightening of the intraventricular septum and reflux of contrast into the IVC concerning for right heart strain. Correlate with echocardiography.    Cirrhotic liver with innumerable hypodense nodules concerning for metastatic disease. Moderate volume ascites with peritoneal nodularity concerning for peritoneal carcinomatosis. Subcentimeter hypodense renal lesions, too small to characterize. Follow-up pending CT abdomen pelvis with intravenous contrast.    < end of copied text >      < from: Colonoscopy (11.04.15 @ 11:34) >  Impression:          - External and internal hemorrhoids.      - One 6 mm polyp at the hepatic flexure. Resected and retrieved.                       - Two 3 mm polyps in the cecum. Resected and retrieved.                       - Four 3 to 5 mm polyps in the ascending colon. Resected and retrieved.              - Diverticulosis in the sigmoid colon.                       - The examined portion of the ileum was normal.  Recommendation:      - Return to GI clinic in 1 month to follow up pathology results.                       - Repeat colonoscopyin 3 years for surveillance.    < end of copied text >    Surgical Pathology Report (12.19.18 @ 15:09)  Final Diagnosis    1. Cecum, polyp, biopsy  - Colonic mucosa with focal hyperplastic change. See noteNote: Deeper levels are also reviewed.  2. Transverse colon, polyp, biopsy  - Fragments of tubular adenoma  3. Rectum, polyp, biopsy  - Hyperplastic polyp    Verified by: Gabriela Stewart M.D.  (Electronic Signature)  Reported on: 12/21/18 15:30 EST, [address] [place] [address], [place]  [address]  _________________________________________________________________    Clinical History  colon polyps    Specimen(s) Submitted  1     Cecal polyp  2     Transverse colon polyp  3     Rectal polyp

## 2019-03-16 NOTE — ED PROVIDER NOTE - PMH
COPD (chronic obstructive pulmonary disease)    GERD (gastroesophageal reflux disease)    HLD (hyperlipidemia)    HTN (hypertension)    Raynauds disease

## 2019-03-16 NOTE — H&P ADULT - NSICDXPASTMEDICALHX_GEN_ALL_CORE_FT
PAST MEDICAL HISTORY:  COPD (chronic obstructive pulmonary disease)     GERD (gastroesophageal reflux disease)     HLD (hyperlipidemia)     HTN (hypertension)     Raynauds disease

## 2019-03-16 NOTE — CONSULT NOTE ADULT - ASSESSMENT
#PE  - Pulmonary emboli likely from hypercoagulable state from malignancy.   - Hemodynamically stable.   - Troponins 36, repeat trops.   - Add on pro-bnp.   - LE dopplers   - C/w heparin drip     #Liver lesions  - Malignancy work up as per primary team.     Philipp Glasgow [phone] F w/ pmh former smoker HTN, HLD, RA (colchicine), peripheral neuropathy who presents for decreased appetite, generalized weakness and confusion for 3 days. Patient is a clinic patient at [facility] and follow up with Dr. Lema in [facility]. Patient had a colonoscopy in dec/2018. Patient also recently returned from [place] 2/18/19. Patient is denied any chest pain, abdominal pain, sob, fever.  Patient is AxO2, no complaints. She has a history of hepatitis A, her hepatitis B and C were negative in 2015.     #PE  - Pulmonary emboli likely from hypercoagulable state from malignancy.   - Hemodynamically stable.   - Troponins 36, repeat trops.   - Add on pro-bnp.   - LE dopplers   - C/w heparin drip   - Telemetry   - Maintain Goal sat 90%     #Liver lesions  - Malignancy work up as per primary team.   - Likely may need sample of ascites.     #GOC  - Spoke with patient whom endorsed patient daughter at bedside can be HCP. Please fill out form.     Patient does not need medical ICU at this time.   Patient is a [facility] clinic patient. PCP is Dr. Lema. Admit to house staff    Philipp Glasgow PGY-2 Call with question [phone] F w/ pmh former smoker HTN, HLD, RA (colchicine), peripheral neuropathy who presents for decreased appetite, generalized weakness and confusion for 3 days. Patient is a clinic patient at [facility] and follow up with Dr. Lema in [facility]. Patient had a colonoscopy in dec/2018. Patient also recently returned from [place] 2/18/19. Patient is denied any chest pain, abdominal pain, sob, fever.  Patient is AxO2, no complaints. She has a history of hepatitis A, her hepatitis B and C were negative in 2015.     #PE  - Pulmonary emboli likely from hypercoagulable state from malignancy.   - Hemodynamically stable, low O2 requirement  - Troponins 36, repeat trop stable  - Add on pro-bnp.   - LE dopplers   - C/w heparin drip   - Telemetry   - Maintain Goal sat 90%     #Liver lesions  - Malignancy work up as per primary team.   - Likely may need sample of ascites.     #GOC  - Spoke with patient whom endorsed patient daughter at bedside will be her surrogate decision maker. Please fill out form.     Patient does not need medical ICU at this time.   Patient is a [facility] clinic patient. PCP is Dr. Lema. Admit to house staff    Philipp Glasgow PGY-2 Call with question [phone]

## 2019-03-16 NOTE — PATIENT PROFILE ADULT - IS PATIENT POST-MENOPAUSAL?
Detail Level: Simple Additional Notes: PRP discussed; steroids cannot be used with prp Additional Notes: Recommended Vitamin B complex yes

## 2019-03-16 NOTE — ED PROVIDER NOTE - OBJECTIVE STATEMENT
73F w/ pmh HTN, HLD, RA (colchicine) p/w decr po, weakness, confusion x 3 days - has had some intermittent disorientation since returned from [place] 2/18/19, however worse last few days, and has new sx of decr po and weakness. no cough, sob, chest / abd pain,. +dysuria, dark urine. No bilat leg swelling / pain. AOx2. No recent medication change, illness, or hospitalization. 73F w/ pmh HTN, HLD, RA (colchicine) p/w decr po, weakness, confusion x 3 days - has had some intermittent disorientation since returned from [place] 2/18/19, however worse last few days, and has new sx of decr po and weakness. no cough, sob, chest / abd pain,. +dysuria, dark urine. No bilat leg swelling / pain. AOx2. No recent medication change, illness, or hospitalization.    PCP: Meir Bustamante 73F w/ pmh HTN, HLD, RA (colchicine) p/w decr po, weakness, confusion x 3 days - has had some intermittent disorientation since returned from [place] 2/18/19, however worse last few days, and has new sx of decr po and weakness. no cough, sob, chest / abd pain,. +dysuria, dark urine. No bilat leg swelling / pain. AOx2. No recent medication change, illness, or hospitalization.

## 2019-03-16 NOTE — ED PROCEDURE NOTE - CPROC ED PHYSICIAN PRESENCE1
Date & Time: 3/1/2023, 9:14 AM  Patient: ELENITA BUITRAGO [employer]ICAL COM[employer]X  Encounter Provider(s):    Vianey Mendoza MD       To Whom It May Concern:    ELENITA BUITRAGO [employer] COM[employer]X was seen and treated in our department on 3/1/2023. She should not return to work until 3/3/2023.     If you have any questions or concerns, please do not hesitate to call.        _____________________________  Physician/APC Signature
I was present during the key portion of the procedure.
I was present during the key portion of the procedure.

## 2019-03-16 NOTE — H&P ADULT - ATTENDING COMMENTS
critical care time 40 mins  Patient seen and examined.  Agree with resident note as above.  Patient with hx as noted who presented to ER with lethargy and mild hypoxia.  Found in ER to have diffuse PE, ascites, cirrhotic liver morphology with numerous masses, peritoneal carcinomatosis.  Unclear primary.  Initally stable and started on heparin in ER, then developed large volume hematemesis and so accepted to MICU.  Intubated, given protamine, hep gtt stopped.  Octreotide/protonix started.  Await GI endoscopy.  Will check TTE and LE duplex; will not be able to tolerate heparin, may require IVC filter.  Needs definitive diagnosis of malignancy - will pursue once stable.  Rest of plan as above and I have edited as appropriate.  Pt identifies daughter as surrogate decision maker.  FULL CODE.

## 2019-03-16 NOTE — ED PROVIDER NOTE - PSH
History of hernia repair    History of rhinoplasty    History of tonsillectomy    S/P left cataract extraction

## 2019-03-16 NOTE — H&P ADULT - ASSESSMENT
73 F w/ pmh former smoker HTN, HLD, RA (colchicine), peripheral neuropathy who presents for decreased appetite, generalized weakness and confusion for 3 days. Patient is a clinic patient at [address] and follow up with Dr. Lema in [facility]. Patient had a colonoscopy in dec/2018. Patient also recently returned from [place] 2/18/19. Patient is denied any chest pain, abdominal pain, sob, fever.  Patient is AxO2, no complaints. She has a history of hepatitis A, her hepatitis B and C were negative in 2015.     #Neuro  encephalopathy likely 2/2 acute illness  -intubated/sedated for airway protection  Hx of peripheral neuropathy    #Cards  troponins downtrended from 36 to 33  ProBNP  -heparin drip for PE stopped in setting of acute bleed    #resp  Intubated for airway protection in setting of UGI bleed, hematemesis  PE  - Pulmonary emboli likely from hypercoagulable state from malignancy.    - Add on pro-bnp.   - LE dopplers   - hold heparin drip, reverse due to acute bleed with protamine  - Telemetry   - Maintain Goal sat 90%     #GI  UGIB and hematemesis  -intubated for airway protection, stop AC  -NPO  -GI c/s  -octreotide + ppi  -protamine to reverse heparin  Liver lesions  - Malignancy work up as per primary team.   - Likely may need sample of ascites.     #Renal  -BUN/Cr sl elevated compared to 1 year prior  -monitor, may worsen in setting of acute blood loss anemia/low volume state  -avoid nephrotoxins       #ID  -recent travel to [place]  -hx of hepatitis A  -received vanc and zosyn in ED  -WBC elevated with 91% neutrophils, afebrile but tachycardic to 92 prior to bleeding  -blood cultures in ED pending  possible sources include ascites however abd exam ok, pna with LLL opacity on CXR  -can cont vanc, zosyn, plus azithro for CAP and f/u urine legionella and blood cultures      #VTE  -scds in setting of acute bleed    #GOC  - Spoke with patient whom endorsed patient daughter at bedside can be HCP. Please fill out form. 73 F w/ pmh former smoker HTN, HLD, RA (colchicine), peripheral neuropathy who presents for decreased appetite, generalized weakness and confusion for 3 days. Patient is a clinic patient at [address] and follow up with Dr. Lema in [facility]. Patient had a colonoscopy in dec/2018. Patient also recently returned from [place] 2/18/19. Patient is denied any chest pain, abdominal pain, sob, fever.  Patient is AxO2, no complaints. She has a history of hepatitis A, her hepatitis B and C were negative in 2015.     #Neuro  -intubated/sedated for airway protection in the setting of GIB  -check ammonia  Hx of peripheral neuropathy    #Cards  troponins downtrended from 36 to 33  ProBNP  -heparin drip for PE stopped in setting of acute bleed  -will check TTE and LE duplex; may require IVCF    #resp  Intubated for airway protection in setting of UGI bleed, hematemesis  PE  - Pulmonary emboli likely from hypercoagulable state from malignancy.    - Add on pro-bnp.   - LE dopplers   - hold heparin drip, reverse due to acute bleed with protamine  - Telemetry   - Maintain Goal sat 90%     #GI  UGIB and hematemesis  -intubated for airway protection, stop AC  -NPO  -GI c/s  -octreotide + ppi  -protamine to reverse heparin  Liver lesions  - Malignancy work up as per primary team.   - Likely may need sample of ascites.     #Renal  -BUN/Cr sl elevated compared to 1 year prior  -monitor, may worsen in setting of acute blood loss anemia/low volume state  -avoid nephrotoxins       #ID  -recent travel to [place]  -hx of hepatitis A  -received vanc and zosyn in ED  -WBC elevated with 91% neutrophils, afebrile but tachycardic to 92 prior to bleeding  -blood cultures in ED pending  possible sources include ascites however abd exam ok, pna with LLL opacity on CXR  -can cont vanc, zosyn, plus azithro for CAP and f/u urine legionella and blood cultures      #VTE  -check LE duplex; no SCD until r/o DVT    #GOC  - Spoke with patient whom endorsed daughter as surrogate decision maker

## 2019-03-16 NOTE — CONSULT NOTE ADULT - ASSESSMENT
IMPRESSION  - Hematemesis Ddx: varices, esophageal or stomach cancer, esophagitis, PUD, erosive gastritis, dieulafoy lesion, angioectasia  - Extensive Pulmonary Embolus  - Liver cirrhosis     RECOMMENDATION    - trend CBC, CMP, INR  - please start pantoprazole (bolus 80mg IV +infusion at 8mg/hr)  - please start octreotide (bolus 50mcg IV +infusion at 50mcg/hr)  - please start Ceftriaxone 1gm IV Qday  - plan for upper endoscopy after discussion with the family, please keep NPO for now  - please give 1 dose of reglan 10mg IV (to improve gastric motility and empty the stomach, such that visualisation is better during the endoscopy)  - supportive care as per primary team    NOTE IS INCOMPLETE     Elmer Meraz, PGY-5  GI fellow  B- [id number]/ [phone]  Please call GI fellow on call after 5pm and on weekends 73 year old female with COPD (former smoker), HTN, HLD, RA (on colchicine), peripheral neuropathy who presented to the emergency room with chief complain of decreased appetite, generalized weakness and confusion for 3 days.     IMPRESSION  - Hematemesis s/p EGD on 3/16/2019 with presence of a large ulcerated mass in the stomach, biopsed  - Extensive Pulmonary Embolus, previously on heparin drip, currently on hold due to massive GI bleeding   - Abnormal CT with innumerable hypodense nodules in the liver, concerning for metastatic disease. Also with moderate volume ascites with peritoneal nodularity concerning for peritoneal carcinomatosis    RECOMMENDATION    - trend CBC, CMP, INR  - please start pantoprazole (bolus 80mg IV +infusion at 8mg/hr)  - please start Ceftriaxone 1gm IV Qday  - pending biopsy results from EGD 3/16/2019  - recommend CT chest, abdomen, pelvis with contrast when stable  - please check CEA, CA 19-9, AFP,   - supportive care as per primary team      Elmer Meraz, PGY-5  GI fellow  B- [id number]/ [phone]  Please call GI fellow on call after 5pm and on weekends

## 2019-03-16 NOTE — ED ADULT NURSE REASSESSMENT NOTE - NS ED NURSE REASSESS COMMENT FT1
At 1845 pt began vomiting 200 cc of bright red blood, MD Finley made aware of the situation and pt moved to [facility]. Pt given zofran and emergency release of two units given. Both units of PRBC started at 1851. MD Dean and Tushar preparing to intubate patient

## 2019-03-16 NOTE — H&P ADULT - NSHPPHYSICALEXAM_GEN_ALL_CORE
GENERAL: intubated, frail  HEAD:  Atraumatic, Normocephalic  EYES: PERRLA, conjunctiva and sclera clear  NECK: No JVD  CHEST/LUNG: Clear to auscultation bilaterally; No wheeze/rhonchi/rales   HEART: Regular rate and rhythm; normal S1 S2,  No murmurs, rubs, or gallops  ABDOMEN: Soft, Nontender, nondistended; Bowel sounds normal  EXTREMITIES:  2+ Peripheral Pulses, No clubbing, cyanosis, or edema  PSYCH: sedated No acute distress   NEUROLOGY: non-focal  SKIN: No rashes or lesions GENERAL: intubated, frail  HEAD:  Atraumatic, Normocephalic  EYES: PERRLA, conjunctiva and sclera clear  NECK: No JVD  CHEST/LUNG: Clear to auscultation bilaterally; No wheeze/rhonchi/rales   HEART: Regular rate and rhythm; normal S1 S2,  No murmurs, rubs, or gallops  ABDOMEN: Soft, Nontender, mildly distended; Bowel sounds normal  EXTREMITIES:  2+ Peripheral Pulses, No clubbing, cyanosis, or edema  PSYCH: sedated No acute distress   NEUROLOGY: non-focal  SKIN: No rashes or lesions

## 2019-03-16 NOTE — CONSULT NOTE ADULT - ATTENDING COMMENTS
Patient seen and examined.  Agree with resident note as above.  Patient with hx as noted and is now admitted for lethargy/acute hypoxia.  Found in ER to have acute B/L PE, CT abd c/w diffuse liver and peritoneal mets of unknown source.  Vitals stable and O2 requirement low.  Unclear if there is an element of sepsis- +leukocytosis.  Patient initially triaged to tele on heparin gtt - interval events include massive hematemesis and subsequent intubation, will accept patient to MICU.  Please see H&P for full details of new plan.

## 2019-03-16 NOTE — ED PROVIDER NOTE - PROGRESS NOTE DETAILS
Darron Whitt PGY2: lactate, wbc elevated - cxr concerning for pna - ordered iv antibiotics, pending CTA - will reassess Darron Whitt PGY2: d/w rads resident - diffuse PE on CT, also concern for diffuse carcinomatosis - ordered CTAP, will re-check weight and start heparin gtt after Darron Whitt PGY2: d/w rads resident - diffuse PE on CT, also concern for diffuse carcinomatosis - ordered CTAP, will re-check weight and start heparin gtt PERT team activated. Darron Whitt PGY2: d/w Dr. Richey - agreed w/ plan and to admission Darron Whitt PGY2: massive GI bleed - moved to critical room, GI paged Attending MD Meeks: patient intubated in setting of hemetamesis via RSI, s/p 2u pRBCs emergency release. ordered for octreotide, IV ppi, already received IV abx. SBPs in 150s. OG placed with migel blood return ~200cc. GI consulted, MICU updated on clinical course and arranging bed. Will given protamine for heparin reversal Attending MD Meeks: GI fellow seeing patient now, they request no further blood products as long as patient remains stable

## 2019-03-16 NOTE — H&P ADULT - NSICDXPASTSURGICALHX_GEN_ALL_CORE_FT
PAST SURGICAL HISTORY:  History of hernia repair     History of rhinoplasty     History of tonsillectomy     S/P left cataract extraction

## 2019-03-16 NOTE — H&P ADULT - NSHPLABSRESULTS_GEN_ALL_CORE
LABS:                        13.5   16.5  )-----------( 172      ( 16 Mar 2019 14:14 )             40.2     Hgb Trend: 13.5<--  03-16    128<L>  |  87<L>  |  45<H>  ----------------------------<  245<H>  4.8   |  16<L>  |  1.12    Ca    8.6      16 Mar 2019 14:14    TPro  6.2  /  Alb  3.6  /  TBili  0.7  /  DBili  x   /  AST  49<H>  /  ALT  32  /  AlkPhos  116  03-16    Creatinine Trend: 1.12<--  PT/INR - ( 16 Mar 2019 14:14 )   PT: 17.5 sec;   INR: 1.50 ratio         PTT - ( 16 Mar 2019 14:14 )  PTT:24.9 sec      Venous Blood Gas:  03-16 @ 18:44  7.37/41/23/23/30  VBG Lactate: 2.4  Venous Blood Gas:  03-16 @ 14:14  7.28/44/25/20/26  VBG Lactate: 6.8      MICROBIOLOGY:     RADIOLOGY:  [ ] Reviewed and interpreted by me    EKG: LABS:                        13.5   16.5  )-----------( 172      ( 16 Mar 2019 14:14 )             40.2     Hgb Trend: 13.5<--  03-16    128<L>  |  87<L>  |  45<H>  ----------------------------<  245<H>  4.8   |  16<L>  |  1.12    Ca    8.6      16 Mar 2019 14:14    TPro  6.2  /  Alb  3.6  /  TBili  0.7  /  DBili  x   /  AST  49<H>  /  ALT  32  /  AlkPhos  116  03-16    Creatinine Trend: 1.12<--  PT/INR - ( 16 Mar 2019 14:14 )   PT: 17.5 sec;   INR: 1.50 ratio         PTT - ( 16 Mar 2019 14:14 )  PTT:24.9 sec      Venous Blood Gas:  03-16 @ 18:44  7.37/41/23/23/30  VBG Lactate: 2.4  Venous Blood Gas:  03-16 @ 14:14  7.28/44/25/20/26  VBG Lactate: 6.8      MICROBIOLOGY: Cultures pending    RADIOLOGY:  [x ] Reviewed and interpreted by me  < from: CT Angio Chest w/ IV Cont (03.16.19 @ 15:53) >    Extensive bilateral pulmonary emboli involving the lobar, segmental and   subsegmental pulmonary arteries.    Straightening of the intraventricular septum and reflux of contrast into   the IVC concerning for right heart strain. Correlate with   echocardiography.    Cirrhotic liver with innumerable hypodense nodules concerning for   metastatic disease. Moderate volume ascites with peritoneal nodularity   concerning for peritoneal carcinomatosis. Subcentimeter hypodense renal   lesions, too small to characterize. Follow-up pending CT abdomen pelvis   with intravenous contrast.    < end of copied text >

## 2019-03-16 NOTE — CONSULT NOTE ADULT - SUBJECTIVE AND OBJECTIVE BOX
CHIEF COMPLAINT:    HPI:  73F w/ pmh HTN, HLD, RA (colchicine) p/w decr po, weakness, confusion x 3 days - has had some intermittent disorientation since returned from [place] 2/18/19, however worse last few days, and has new sx of decr po and weakness. no cough, sob, chest / abd pain,. +dysuria, dark urine. No bilat leg swelling / pain. AOx2. No recent medication change, illness, or hospitalization.    PAST MEDICAL & SURGICAL HISTORY:  Raynauds disease  GERD (gastroesophageal reflux disease)  HLD (hyperlipidemia)  HTN (hypertension)  COPD (chronic obstructive pulmonary disease)  S/P left cataract extraction  History of tonsillectomy  History of rhinoplasty  History of hernia repair    FAMILY HISTORY:    SOCIAL HISTORY:  Smoking: __ packs x ___ years  EtOH Use:  Marital Status:  Occupation:  Recent Travel:  Country of Birth:  Advance Directives:    Allergies    No Known Allergies    Intolerances        HOME MEDICATIONS:    REVIEW OF SYSTEMS:  Constitutional:   Eyes:  ENT:  CV:  Resp:  GI:  :  MSK:  Integumentary:  Neurological:  Psychiatric:  Endocrine:  Hematologic/Lymphatic:  Allergic/Immunologic:  [ ] All other systems negative  [ ] Unable to assess ROS because ________    OBJECTIVE:  ICU Vital Signs Last 24 Hrs  T(C): 36.2 (16 Mar 2019 13:28), Max: 36.2 (16 Mar 2019 12:10)  T(F): 97.1 (16 Mar 2019 13:28), Max: 97.1 (16 Mar 2019 12:10)  HR: 89 (16 Mar 2019 16:24) (89 - 100)  BP: 154/89 (16 Mar 2019 16:24) (124/61 - 154/89)  BP(mean): --  ABP: --  ABP(mean): --  RR: 20 (16 Mar 2019 16:24) (16 - 20)  SpO2: 99% (16 Mar 2019 16:24) (84% - 100%)        CAPILLARY BLOOD GLUCOSE      POCT Blood Glucose.: 158 mg/dL (16 Mar 2019 12:13)      PHYSICAL EXAM:  General:   HEENT:   Lymph Nodes:  Neck:   Respiratory:   Cardiovascular:   Abdomen:   Extremities:   Skin:   Neurological:  Psychiatry:    HOSPITAL MEDICATIONS:  MEDICATIONS  (STANDING):  heparin  Infusion.  Unit(s)/Hr (11 mL/Hr) IV Continuous <Continuous>  vancomycin  IVPB 1000 milliGRAM(s) IV Intermittent once    MEDICATIONS  (PRN):  heparin  Injectable 4500 Unit(s) IV Push every 6 hours PRN For aPTT less than 40  heparin  Injectable 2000 Unit(s) IV Push every 6 hours PRN For aPTT between 40 - 57      LABS:                        13.5   16.5  )-----------( 172      ( 16 Mar 2019 14:14 )             40.2     03-16    128<L>  |  87<L>  |  45<H>  ----------------------------<  245<H>  4.8   |  16<L>  |  1.12    Ca    8.6      16 Mar 2019 14:14    TPro  6.2  /  Alb  3.6  /  TBili  0.7  /  DBili  x   /  AST  49<H>  /  ALT  32  /  AlkPhos  116  03-16    PT/INR - ( 16 Mar 2019 14:14 )   PT: 17.5 sec;   INR: 1.50 ratio         PTT - ( 16 Mar 2019 14:14 )  PTT:24.9 sec      Venous Blood Gas:  03-16 @ 14:14  7.28/44/25/20/26  VBG Lactate: 6.8      MICROBIOLOGY:     RADIOLOGY:  [ ] Reviewed and interpreted by me    EKG:    < from: CT Angio Chest w/ IV Cont (03.16.19 @ 15:53) >    FINDINGS:    CHEST:     LUNGS AND LARGE AIRWAYS: Patent central airways.  6 mm left lower lobe   and 5 mm right upper lobe calcified granulomas, decreased in size from   11/5/2012, favoring benign etiology.  PLEURA: No pleural effusion.  VESSELS: There is acute pulmonary emboli involvingthe bilateral main   pulmonary arteries extending into the lobar, segmental and subsegmental   pulmonary arteries of the right upper lobe, right lower lobe, right   middle lobe, left lower lobe and left upper lobe. Atherosclerotic   ulcerations of the thoracic aorta and coronary arteries.  HEART: Heart size is normal. No pericardial effusion. There is mild   straightening of the intraventricular septum with reflux of contrast into   the IVC concerning for right heart strain.  MEDIASTINUM AND SHANELL: No lymphadenopathy.  CHEST WALL AND LOWER NECK: Within normal limits.  VISUALIZED UPPER ABDOMEN: Cirrhotic morphology with innumerable hypodense   hepatic nodules. Cholelithiasis. Moderate volume ascites with peritoneal   nodularity concerning for peritoneal carcinomatosis. Subcentimeter   hypodense renal lesions, too small to characterize. Anasarca along the   imaged abdominal wall.  BONES: Multilevel spinal degenerative changes.    IMPRESSION:   Extensive bilateral pulmonary emboli involving the lobar, segmental and   subsegmental pulmonary arteries.    Straightening of the intraventricular septum and reflux of contrast into   the IVC concerning for right heart strain. Correlate with   echocardiography.    Cirrhotic liver with innumerable hypodense nodules concerning for   metastatic disease. Moderate volume ascites with peritoneal nodularity   concerning for peritoneal carcinomatosis. Subcentimeter hypodense renal   lesions, too small to characterize. Follow-up pending CT abdomen pelvis   with intravenous contrast. CHIEF COMPLAINT:    HPI:  73 F w/ pmh former smoker HTN, HLD, RA (colchicine), peripheral neuropathy who presents for decreased appetite, generalized weakness and confusion for 3 days. Patient is a clinic patient at [facility] and follow up with Dr. Lema in [facility]. Patient had a colonoscopy in dec/2018. Patient also recently returned from [place] 2/18/19. Patient is denied any chest pain, abdominal pain, sob, fever.  Patient is AxO2, no complaints. She has a history of hepatitis A, B and C were negative.     PAST MEDICAL & SURGICAL HISTORY:  Raynauds disease  GERD (gastroesophageal reflux disease)  HLD (hyperlipidemia)  HTN (hypertension)  COPD (chronic obstructive pulmonary disease)  S/P left cataract extraction  History of tonsillectomy  History of rhinoplasty  History of hernia repair    FAMILY HISTORY:    SOCIAL HISTORY:  Smoking: former smoker   EtOH Use: denied etoh  Drugs: no drugs   Occupation: Retired teacher   Recent Travel: [facility] [place]  Country of Birth:  Advance Directives:    Allergies  No Known Allergies  Intolerances    HOME MEDICATIONS:    REVIEW OF SYSTEMS:  Constitutional: No Fever, Fatigue,   Eyes: No recent vision problems or eye pain.  ENT: No congestion, ear pain, or sore throat.  Endocrine: No excess sweating, temperature intolerance  Cardiovascular:  Shortness of breath. No chest pain, palpitations, pre-syncope, syncope  Respiratory: No cough, congestion, or wheezing.  Gastrointestinal: No abdominal pain, nausea, vomiting, or diarrhea.  Genitourinary: No dysuria, hematuria  Musculoskeletal: No joint swelling, joint pain  Neurologic: No headache, dizziness, focal weakness  Skin: No rashes, hematoma, prupura    OBJECTIVE:  ICU Vital Signs Last 24 Hrs  T(C): 36.2 (16 Mar 2019 13:28), Max: 36.2 (16 Mar 2019 12:10)  T(F): 97.1 (16 Mar 2019 13:28), Max: 97.1 (16 Mar 2019 12:10)  HR: 89 (16 Mar 2019 16:24) (89 - 100)  BP: 154/89 (16 Mar 2019 16:24) (124/61 - 154/89)  RR: 20 (16 Mar 2019 16:24) (16 - 20)  SpO2: 99% (16 Mar 2019 16:24) (84% - 100%)    CAPILLARY BLOOD GLUCOSE  POCT Blood Glucose.: 158 mg/dL (16 Mar 2019 12:13)      GENERAL: NAD, frail  HEAD:  Atraumatic, Normocephalic  EYES: EOMI, PERRLA, conjunctiva and sclera clear  NECK: Supple, No JVD  CHEST/LUNG: Clear to auscultation bilaterally; No wheeze/rhonchi/rales   HEART: Regular rate and rhythm; normal S1 S2,  No murmurs, rubs, or gallops  ABDOMEN: Soft, Nontender, nondistended; Bowel sounds normal  EXTREMITIES:  2+ Peripheral Pulses, No clubbing, cyanosis, or edema  PSYCH: AAOx3, No acute distress   NEUROLOGY: non-focal  SKIN: No rashes or lesions      HOSPITAL MEDICATIONS:  MEDICATIONS  (STANDING):  heparin  Infusion.  Unit(s)/Hr (11 mL/Hr) IV Continuous <Continuous>  vancomycin  IVPB 1000 milliGRAM(s) IV Intermittent once    MEDICATIONS  (PRN):  heparin  Injectable 4500 Unit(s) IV Push every 6 hours PRN For aPTT less than 40  heparin  Injectable 2000 Unit(s) IV Push every 6 hours PRN For aPTT between 40 - 57      LABS:                        13.5   16.5  )-----------( 172      ( 16 Mar 2019 14:14 )             40.2     03-16    128<L>  |  87<L>  |  45<H>  ----------------------------<  245<H>  4.8   |  16<L>  |  1.12    Ca    8.6      16 Mar 2019 14:14    TPro  6.2  /  Alb  3.6  /  TBili  0.7  /  DBili  x   /  AST  49<H>  /  ALT  32  /  AlkPhos  116  03-16    PT/INR - ( 16 Mar 2019 14:14 )   PT: 17.5 sec;   INR: 1.50 ratio         PTT - ( 16 Mar 2019 14:14 )  PTT:24.9 sec      Venous Blood Gas:  03-16 @ 14:14  7.28/44/25/20/26  VBG Lactate: 6.8      MICROBIOLOGY:     RADIOLOGY:  [ ] Reviewed and interpreted by me    EKG:    < from: CT Angio Chest w/ IV Cont (03.16.19 @ 15:53) >    FINDINGS:    CHEST:     LUNGS AND LARGE AIRWAYS: Patent central airways.  6 mm left lower lobe   and 5 mm right upper lobe calcified granulomas, decreased in size from   11/5/2012, favoring benign etiology.  PLEURA: No pleural effusion.  VESSELS: There is acute pulmonary emboli involvingthe bilateral main   pulmonary arteries extending into the lobar, segmental and subsegmental   pulmonary arteries of the right upper lobe, right lower lobe, right   middle lobe, left lower lobe and left upper lobe. Atherosclerotic   ulcerations of the thoracic aorta and coronary arteries.  HEART: Heart size is normal. No pericardial effusion. There is mild   straightening of the intraventricular septum with reflux of contrast into   the IVC concerning for right heart strain.  MEDIASTINUM AND SHANELL: No lymphadenopathy.  CHEST WALL AND LOWER NECK: Within normal limits.  VISUALIZED UPPER ABDOMEN: Cirrhotic morphology with innumerable hypodense   hepatic nodules. Cholelithiasis. Moderate volume ascites with peritoneal   nodularity concerning for peritoneal carcinomatosis. Subcentimeter   hypodense renal lesions, too small to characterize. Anasarca along the   imaged abdominal wall.  BONES: Multilevel spinal degenerative changes.    IMPRESSION:   Extensive bilateral pulmonary emboli involving the lobar, segmental and   subsegmental pulmonary arteries.    Straightening of the intraventricular septum and reflux of contrast into   the IVC concerning for right heart strain. Correlate with   echocardiography.    Cirrhotic liver with innumerable hypodense nodules concerning for   metastatic disease. Moderate volume ascites with peritoneal nodularity   concerning for peritoneal carcinomatosis. Subcentimeter hypodense renal   lesions, too small to characterize. Follow-up pending CT abdomen pelvis   with intravenous contrast. CHIEF COMPLAINT:    HPI:  73 F w/ pmh former smoker HTN, HLD, RA (colchicine), peripheral neuropathy who presents for decreased appetite, generalized weakness and confusion for 3 days. Patient is a clinic patient at [facility] and follow up with Dr. Lema in [facility]. Patient had a colonoscopy in dec/2018. Patient also recently returned from [place] 2/18/19. Patient is denied any chest pain, abdominal pain, sob, fever.  Patient is AxO2, no complaints. She has a history of hepatitis A, B and C were negative.     PAST MEDICAL & SURGICAL HISTORY:  Raynauds disease  GERD (gastroesophageal reflux disease)  HLD (hyperlipidemia)  HTN (hypertension)  COPD (chronic obstructive pulmonary disease)  S/P left cataract extraction  History of tonsillectomy  History of rhinoplasty  History of hernia repair    FAMILY HISTORY:    SOCIAL HISTORY:  Smoking: former smoker   EtOH Use: denied etoh  Drugs: no drugs   Occupation: Retired teacher   Recent Travel: [facility] [place]  Country of Birth:  Advance Directives:    Allergies  No Known Allergies  Intolerances    HOME MEDICATIONS:    REVIEW OF SYSTEMS:  Constitutional: No Fever, Fatigue,   Eyes: No recent vision problems or eye pain.  ENT: No congestion, ear pain, or sore throat.  Endocrine: No excess sweating, temperature intolerance  Cardiovascular:  Shortness of breath. No chest pain, palpitations, pre-syncope, syncope  Respiratory: No cough, congestion, or wheezing.  Gastrointestinal: No abdominal pain, nausea, vomiting, or diarrhea.  Genitourinary: No dysuria, hematuria  Musculoskeletal: No joint swelling, joint pain  Neurologic: No headache, dizziness, focal weakness  Skin: No rashes, hematoma, prupura    OBJECTIVE:  ICU Vital Signs Last 24 Hrs  T(C): 36.2 (16 Mar 2019 13:28), Max: 36.2 (16 Mar 2019 12:10)  T(F): 97.1 (16 Mar 2019 13:28), Max: 97.1 (16 Mar 2019 12:10)  HR: 89 (16 Mar 2019 16:24) (89 - 100)  BP: 154/89 (16 Mar 2019 16:24) (124/61 - 154/89)  RR: 20 (16 Mar 2019 16:24) (16 - 20)  SpO2: 99% (16 Mar 2019 16:24) (84% - 100%)    CAPILLARY BLOOD GLUCOSE  POCT Blood Glucose.: 158 mg/dL (16 Mar 2019 12:13)      GENERAL: NAD, frail  HEAD:  Atraumatic, Normocephalic  EYES: EOMI, PERRLA, conjunctiva and sclera clear  NECK: Supple, No JVD  CHEST/LUNG: Clear to auscultation bilaterally; No wheeze/rhonchi/rales   HEART: Regular rate and rhythm; normal S1 S2,  No murmurs, rubs, or gallops  ABDOMEN: Soft, Nontender, nondistended; Bowel sounds normal  EXTREMITIES:  2+ Peripheral Pulses, No clubbing, cyanosis, or edema  PSYCH: AAOx3, No acute distress   NEUROLOGY: non-focal  SKIN: No rashes or lesions      HOSPITAL MEDICATIONS:  MEDICATIONS  (STANDING):  heparin  Infusion.  Unit(s)/Hr (11 mL/Hr) IV Continuous <Continuous>  vancomycin  IVPB 1000 milliGRAM(s) IV Intermittent once    MEDICATIONS  (PRN):  heparin  Injectable 4500 Unit(s) IV Push every 6 hours PRN For aPTT less than 40  heparin  Injectable 2000 Unit(s) IV Push every 6 hours PRN For aPTT between 40 - 57      LABS:                        13.5   16.5  )-----------( 172      ( 16 Mar 2019 14:14 )             40.2     03-16    128<L>  |  87<L>  |  45<H>  ----------------------------<  245<H>  4.8   |  16<L>  |  1.12    Ca    8.6      16 Mar 2019 14:14    TPro  6.2  /  Alb  3.6  /  TBili  0.7  /  DBili  x   /  AST  49<H>  /  ALT  32  /  AlkPhos  116  03-16    PT/INR - ( 16 Mar 2019 14:14 )   PT: 17.5 sec;   INR: 1.50 ratio         PTT - ( 16 Mar 2019 14:14 )  PTT:24.9 sec      Venous Blood Gas:  03-16 @ 14:14  7.28/44/25/20/26  VBG Lactate: 6.8      MICROBIOLOGY: BCx pending    RADIOLOGY:  [x ] Reviewed and interpreted by me    EKG:    < from: CT Angio Chest w/ IV Cont (03.16.19 @ 15:53) >    FINDINGS:    CHEST:     LUNGS AND LARGE AIRWAYS: Patent central airways.  6 mm left lower lobe   and 5 mm right upper lobe calcified granulomas, decreased in size from   11/5/2012, favoring benign etiology.  PLEURA: No pleural effusion.  VESSELS: There is acute pulmonary emboli involvingthe bilateral main   pulmonary arteries extending into the lobar, segmental and subsegmental   pulmonary arteries of the right upper lobe, right lower lobe, right   middle lobe, left lower lobe and left upper lobe. Atherosclerotic   ulcerations of the thoracic aorta and coronary arteries.  HEART: Heart size is normal. No pericardial effusion. There is mild   straightening of the intraventricular septum with reflux of contrast into   the IVC concerning for right heart strain.  MEDIASTINUM AND SHANELL: No lymphadenopathy.  CHEST WALL AND LOWER NECK: Within normal limits.  VISUALIZED UPPER ABDOMEN: Cirrhotic morphology with innumerable hypodense   hepatic nodules. Cholelithiasis. Moderate volume ascites with peritoneal   nodularity concerning for peritoneal carcinomatosis. Subcentimeter   hypodense renal lesions, too small to characterize. Anasarca along the   imaged abdominal wall.  BONES: Multilevel spinal degenerative changes.    IMPRESSION:   Extensive bilateral pulmonary emboli involving the lobar, segmental and   subsegmental pulmonary arteries.    Straightening of the intraventricular septum and reflux of contrast into   the IVC concerning for right heart strain. Correlate with   echocardiography.    Cirrhotic liver with innumerable hypodense nodules concerning for   metastatic disease. Moderate volume ascites with peritoneal nodularity   concerning for peritoneal carcinomatosis. Subcentimeter hypodense renal   lesions, too small to characterize. Follow-up pending CT abdomen pelvis   with intravenous contrast.

## 2019-03-16 NOTE — ED PROVIDER NOTE - CLINICAL SUMMARY MEDICAL DECISION MAKING FREE TEXT BOX
73F w/ ams, weakness - tachycardiac / tachypnic low sats in 80s (not on o2 at home) will check labs, cta chest, reassess

## 2019-03-16 NOTE — ED ADULT NURSE NOTE - NSIMPLEMENTINTERV_GEN_ALL_ED
Implemented All Fall Risk Interventions:  [place] to call system. Call bell, personal items and telephone within reach. Instruct patient to call for assistance. Room bathroom lighting operational. Non-slip footwear when patient is off stretcher. Physically safe environment: no spills, clutter or unnecessary equipment. Stretcher in lowest position, wheels locked, appropriate side rails in place. Provide visual cue, wrist band, yellow gown, etc. Monitor gait and stability. Monitor for mental status changes and reorient to person, place, and time. Review medications for side effects contributing to fall risk. Reinforce activity limits and safety measures with patient and family.

## 2019-03-17 LAB
ALBUMIN SERPL ELPH-MCNC: 3.5 G/DL — SIGNIFICANT CHANGE UP (ref 3.3–5)
ALBUMIN SERPL ELPH-MCNC: 4 G/DL — SIGNIFICANT CHANGE UP (ref 3.3–5)
ALBUMIN SERPL ELPH-MCNC: 4.1 G/DL — SIGNIFICANT CHANGE UP (ref 3.3–5)
ALP SERPL-CCNC: 82 U/L — SIGNIFICANT CHANGE UP (ref 40–120)
ALP SERPL-CCNC: 87 U/L — SIGNIFICANT CHANGE UP (ref 40–120)
ALP SERPL-CCNC: 98 U/L — SIGNIFICANT CHANGE UP (ref 40–120)
ALT FLD-CCNC: 309 U/L — HIGH (ref 10–45)
ALT FLD-CCNC: 335 U/L — HIGH (ref 10–45)
ALT FLD-CCNC: 84 U/L — HIGH (ref 10–45)
AMMONIA BLD-MCNC: 51 UMOL/L — SIGNIFICANT CHANGE UP (ref 11–55)
ANION GAP SERPL CALC-SCNC: 18 MMOL/L — HIGH (ref 5–17)
ANION GAP SERPL CALC-SCNC: 19 MMOL/L — HIGH (ref 5–17)
ANION GAP SERPL CALC-SCNC: 19 MMOL/L — HIGH (ref 5–17)
APTT BLD: 21.4 SEC — LOW (ref 27.5–36.3)
APTT BLD: 24.4 SEC — LOW (ref 27.5–36.3)
APTT BLD: 26.4 SEC — LOW (ref 27.5–36.3)
AST SERPL-CCNC: 1095 U/L — HIGH (ref 10–40)
AST SERPL-CCNC: 194 U/L — HIGH (ref 10–40)
AST SERPL-CCNC: 731 U/L — HIGH (ref 10–40)
B-OH-BUTYR SERPL-SCNC: 0.7 MMOL/L — HIGH
BASOPHILS # BLD AUTO: 0 K/UL — SIGNIFICANT CHANGE UP (ref 0–0.2)
BASOPHILS NFR BLD AUTO: 0 % — SIGNIFICANT CHANGE UP (ref 0–2)
BILIRUB SERPL-MCNC: 1.1 MG/DL — SIGNIFICANT CHANGE UP (ref 0.2–1.2)
BILIRUB SERPL-MCNC: 1.2 MG/DL — SIGNIFICANT CHANGE UP (ref 0.2–1.2)
BILIRUB SERPL-MCNC: 1.3 MG/DL — HIGH (ref 0.2–1.2)
BUN SERPL-MCNC: 42 MG/DL — HIGH (ref 7–23)
BUN SERPL-MCNC: 44 MG/DL — HIGH (ref 7–23)
BUN SERPL-MCNC: 45 MG/DL — HIGH (ref 7–23)
CALCIUM SERPL-MCNC: 8.2 MG/DL — LOW (ref 8.4–10.5)
CALCIUM SERPL-MCNC: 8.5 MG/DL — SIGNIFICANT CHANGE UP (ref 8.4–10.5)
CALCIUM SERPL-MCNC: 8.6 MG/DL — SIGNIFICANT CHANGE UP (ref 8.4–10.5)
CHLORIDE SERPL-SCNC: 92 MMOL/L — LOW (ref 96–108)
CHLORIDE SERPL-SCNC: 93 MMOL/L — LOW (ref 96–108)
CHLORIDE SERPL-SCNC: 94 MMOL/L — LOW (ref 96–108)
CO2 SERPL-SCNC: 13 MMOL/L — LOW (ref 22–31)
CO2 SERPL-SCNC: 17 MMOL/L — LOW (ref 22–31)
CO2 SERPL-SCNC: 18 MMOL/L — LOW (ref 22–31)
CREAT SERPL-MCNC: 1.27 MG/DL — SIGNIFICANT CHANGE UP (ref 0.5–1.3)
CREAT SERPL-MCNC: 1.39 MG/DL — HIGH (ref 0.5–1.3)
CREAT SERPL-MCNC: 1.63 MG/DL — HIGH (ref 0.5–1.3)
CULTURE RESULTS: NO GROWTH — SIGNIFICANT CHANGE UP
EOSINOPHIL # BLD AUTO: 0.1 K/UL — SIGNIFICANT CHANGE UP (ref 0–0.5)
EOSINOPHIL NFR BLD AUTO: 0.4 % — SIGNIFICANT CHANGE UP (ref 0–6)
GAS PNL BLDA: SIGNIFICANT CHANGE UP
GLUCOSE BLDC GLUCOMTR-MCNC: 128 MG/DL — HIGH (ref 70–99)
GLUCOSE BLDC GLUCOMTR-MCNC: 129 MG/DL — HIGH (ref 70–99)
GLUCOSE BLDC GLUCOMTR-MCNC: 131 MG/DL — HIGH (ref 70–99)
GLUCOSE BLDC GLUCOMTR-MCNC: 158 MG/DL — HIGH (ref 70–99)
GLUCOSE SERPL-MCNC: 132 MG/DL — HIGH (ref 70–99)
GLUCOSE SERPL-MCNC: 149 MG/DL — HIGH (ref 70–99)
GLUCOSE SERPL-MCNC: 294 MG/DL — HIGH (ref 70–99)
HCT VFR BLD CALC: 30.5 % — LOW (ref 34.5–45)
HCT VFR BLD CALC: 35.1 % — SIGNIFICANT CHANGE UP (ref 34.5–45)
HCT VFR BLD CALC: 39.8 % — SIGNIFICANT CHANGE UP (ref 34.5–45)
HGB BLD-MCNC: 10.5 G/DL — LOW (ref 11.5–15.5)
HGB BLD-MCNC: 11.9 G/DL — SIGNIFICANT CHANGE UP (ref 11.5–15.5)
HGB BLD-MCNC: 13.6 G/DL — SIGNIFICANT CHANGE UP (ref 11.5–15.5)
INR BLD: 1.36 RATIO — HIGH (ref 0.88–1.16)
INR BLD: 1.37 RATIO — HIGH (ref 0.88–1.16)
INR BLD: 1.46 RATIO — HIGH (ref 0.88–1.16)
LYMPHOCYTES # BLD AUTO: 0.7 K/UL — LOW (ref 1–3.3)
LYMPHOCYTES # BLD AUTO: 3.8 % — LOW (ref 13–44)
MAGNESIUM SERPL-MCNC: 2 MG/DL — SIGNIFICANT CHANGE UP (ref 1.6–2.6)
MAGNESIUM SERPL-MCNC: 2.2 MG/DL — SIGNIFICANT CHANGE UP (ref 1.6–2.6)
MAGNESIUM SERPL-MCNC: 2.2 MG/DL — SIGNIFICANT CHANGE UP (ref 1.6–2.6)
MCHC RBC-ENTMCNC: 30.5 PG — SIGNIFICANT CHANGE UP (ref 27–34)
MCHC RBC-ENTMCNC: 30.6 PG — SIGNIFICANT CHANGE UP (ref 27–34)
MCHC RBC-ENTMCNC: 30.8 PG — SIGNIFICANT CHANGE UP (ref 27–34)
MCHC RBC-ENTMCNC: 33.9 GM/DL — SIGNIFICANT CHANGE UP (ref 32–36)
MCHC RBC-ENTMCNC: 34.2 GM/DL — SIGNIFICANT CHANGE UP (ref 32–36)
MCHC RBC-ENTMCNC: 34.3 GM/DL — SIGNIFICANT CHANGE UP (ref 32–36)
MCV RBC AUTO: 89.2 FL — SIGNIFICANT CHANGE UP (ref 80–100)
MCV RBC AUTO: 90 FL — SIGNIFICANT CHANGE UP (ref 80–100)
MCV RBC AUTO: 90.3 FL — SIGNIFICANT CHANGE UP (ref 80–100)
MONOCYTES # BLD AUTO: 1.5 K/UL — HIGH (ref 0–0.9)
MONOCYTES NFR BLD AUTO: 8.3 % — SIGNIFICANT CHANGE UP (ref 2–14)
NEUTROPHILS # BLD AUTO: 16.2 K/UL — HIGH (ref 1.8–7.4)
NEUTROPHILS NFR BLD AUTO: 87.5 % — HIGH (ref 43–77)
NT-PROBNP SERPL-SCNC: 6259 PG/ML — HIGH (ref 0–300)
PHOSPHATE SERPL-MCNC: 3.5 MG/DL — SIGNIFICANT CHANGE UP (ref 2.5–4.5)
PHOSPHATE SERPL-MCNC: 4.5 MG/DL — SIGNIFICANT CHANGE UP (ref 2.5–4.5)
PHOSPHATE SERPL-MCNC: 6.2 MG/DL — HIGH (ref 2.5–4.5)
PLATELET # BLD AUTO: 112 K/UL — LOW (ref 150–400)
PLATELET # BLD AUTO: 120 K/UL — LOW (ref 150–400)
PLATELET # BLD AUTO: 78 K/UL — LOW (ref 150–400)
POTASSIUM SERPL-MCNC: 4.4 MMOL/L — SIGNIFICANT CHANGE UP (ref 3.5–5.3)
POTASSIUM SERPL-MCNC: 4.9 MMOL/L — SIGNIFICANT CHANGE UP (ref 3.5–5.3)
POTASSIUM SERPL-MCNC: 5.2 MMOL/L — SIGNIFICANT CHANGE UP (ref 3.5–5.3)
POTASSIUM SERPL-SCNC: 4.4 MMOL/L — SIGNIFICANT CHANGE UP (ref 3.5–5.3)
POTASSIUM SERPL-SCNC: 4.9 MMOL/L — SIGNIFICANT CHANGE UP (ref 3.5–5.3)
POTASSIUM SERPL-SCNC: 5.2 MMOL/L — SIGNIFICANT CHANGE UP (ref 3.5–5.3)
PROT SERPL-MCNC: 5.5 G/DL — LOW (ref 6–8.3)
PROT SERPL-MCNC: 5.5 G/DL — LOW (ref 6–8.3)
PROT SERPL-MCNC: 5.9 G/DL — LOW (ref 6–8.3)
PROTHROM AB SERPL-ACNC: 15.6 SEC — HIGH (ref 10–12.9)
PROTHROM AB SERPL-ACNC: 15.9 SEC — HIGH (ref 10–12.9)
PROTHROM AB SERPL-ACNC: 17 SEC — HIGH (ref 10–12.9)
RBC # BLD: 3.42 M/UL — LOW (ref 3.8–5.2)
RBC # BLD: 3.9 M/UL — SIGNIFICANT CHANGE UP (ref 3.8–5.2)
RBC # BLD: 4.4 M/UL — SIGNIFICANT CHANGE UP (ref 3.8–5.2)
RBC # FLD: 13.1 % — SIGNIFICANT CHANGE UP (ref 10.3–14.5)
RBC # FLD: 13.6 % — SIGNIFICANT CHANGE UP (ref 10.3–14.5)
RBC # FLD: 13.8 % — SIGNIFICANT CHANGE UP (ref 10.3–14.5)
SODIUM SERPL-SCNC: 125 MMOL/L — LOW (ref 135–145)
SODIUM SERPL-SCNC: 128 MMOL/L — LOW (ref 135–145)
SODIUM SERPL-SCNC: 130 MMOL/L — LOW (ref 135–145)
SPECIMEN SOURCE: SIGNIFICANT CHANGE UP
VANCOMYCIN FLD-MCNC: 6.8 UG/ML — SIGNIFICANT CHANGE UP
WBC # BLD: 14 K/UL — HIGH (ref 3.8–10.5)
WBC # BLD: 17.4 K/UL — HIGH (ref 3.8–10.5)
WBC # BLD: 18.5 K/UL — HIGH (ref 3.8–10.5)
WBC # FLD AUTO: 14 K/UL — HIGH (ref 3.8–10.5)
WBC # FLD AUTO: 17.4 K/UL — HIGH (ref 3.8–10.5)
WBC # FLD AUTO: 18.5 K/UL — HIGH (ref 3.8–10.5)

## 2019-03-17 PROCEDURE — 37191 INS ENDOVAS VENA CAVA FILTR: CPT

## 2019-03-17 PROCEDURE — 43239 EGD BIOPSY SINGLE/MULTIPLE: CPT | Mod: GC

## 2019-03-17 PROCEDURE — 93306 TTE W/DOPPLER COMPLETE: CPT | Mod: 26

## 2019-03-17 PROCEDURE — 93970 EXTREMITY STUDY: CPT | Mod: 26

## 2019-03-17 PROCEDURE — 99291 CRITICAL CARE FIRST HOUR: CPT

## 2019-03-17 PROCEDURE — 99223 1ST HOSP IP/OBS HIGH 75: CPT | Mod: GC,25

## 2019-03-17 RX ORDER — PANTOPRAZOLE SODIUM 20 MG/1
40 TABLET, DELAYED RELEASE ORAL
Qty: 0 | Refills: 0 | Status: DISCONTINUED | OUTPATIENT
Start: 2019-03-17 | End: 2019-03-25

## 2019-03-17 RX ORDER — DEXMEDETOMIDINE HYDROCHLORIDE IN 0.9% SODIUM CHLORIDE 4 UG/ML
0.7 INJECTION INTRAVENOUS
Qty: 200 | Refills: 0 | Status: DISCONTINUED | OUTPATIENT
Start: 2019-03-17 | End: 2019-03-18

## 2019-03-17 RX ORDER — AZITHROMYCIN 500 MG/1
500 TABLET, FILM COATED ORAL ONCE
Qty: 0 | Refills: 0 | Status: COMPLETED | OUTPATIENT
Start: 2019-03-17 | End: 2019-03-17

## 2019-03-17 RX ORDER — PIPERACILLIN AND TAZOBACTAM 4; .5 G/20ML; G/20ML
3.38 INJECTION, POWDER, LYOPHILIZED, FOR SOLUTION INTRAVENOUS EVERY 8 HOURS
Qty: 0 | Refills: 0 | Status: DISCONTINUED | OUTPATIENT
Start: 2019-03-17 | End: 2019-03-18

## 2019-03-17 RX ORDER — AZITHROMYCIN 500 MG/1
TABLET, FILM COATED ORAL
Qty: 0 | Refills: 0 | Status: DISCONTINUED | OUTPATIENT
Start: 2019-03-17 | End: 2019-03-18

## 2019-03-17 RX ORDER — NOREPINEPHRINE BITARTRATE/D5W 8 MG/250ML
0.05 PLASTIC BAG, INJECTION (ML) INTRAVENOUS
Qty: 8 | Refills: 0 | Status: DISCONTINUED | OUTPATIENT
Start: 2019-03-17 | End: 2019-03-19

## 2019-03-17 RX ORDER — VANCOMYCIN HCL 1 G
1500 VIAL (EA) INTRAVENOUS ONCE
Qty: 0 | Refills: 0 | Status: COMPLETED | OUTPATIENT
Start: 2019-03-17 | End: 2019-03-17

## 2019-03-17 RX ORDER — CHLORHEXIDINE GLUCONATE 213 G/1000ML
1 SOLUTION TOPICAL DAILY
Qty: 0 | Refills: 0 | Status: DISCONTINUED | OUTPATIENT
Start: 2019-03-17 | End: 2019-03-18

## 2019-03-17 RX ORDER — INSULIN LISPRO 100/ML
VIAL (ML) SUBCUTANEOUS EVERY 6 HOURS
Qty: 0 | Refills: 0 | Status: DISCONTINUED | OUTPATIENT
Start: 2019-03-17 | End: 2019-03-19

## 2019-03-17 RX ORDER — ALBUMIN HUMAN 25 %
250 VIAL (ML) INTRAVENOUS ONCE
Qty: 0 | Refills: 0 | Status: COMPLETED | OUTPATIENT
Start: 2019-03-17 | End: 2019-03-17

## 2019-03-17 RX ORDER — CHLORHEXIDINE GLUCONATE 213 G/1000ML
1 SOLUTION TOPICAL
Qty: 0 | Refills: 0 | Status: DISCONTINUED | OUTPATIENT
Start: 2019-03-17 | End: 2019-03-18

## 2019-03-17 RX ORDER — AZITHROMYCIN 500 MG/1
500 TABLET, FILM COATED ORAL EVERY 24 HOURS
Qty: 0 | Refills: 0 | Status: DISCONTINUED | OUTPATIENT
Start: 2019-03-18 | End: 2019-03-18

## 2019-03-17 RX ADMIN — CHLORHEXIDINE GLUCONATE 1 APPLICATION(S): 213 SOLUTION TOPICAL at 13:36

## 2019-03-17 RX ADMIN — PIPERACILLIN AND TAZOBACTAM 25 GRAM(S): 4; .5 INJECTION, POWDER, LYOPHILIZED, FOR SOLUTION INTRAVENOUS at 21:34

## 2019-03-17 RX ADMIN — Medication 300 MILLIGRAM(S): at 21:34

## 2019-03-17 RX ADMIN — AZITHROMYCIN 250 MILLIGRAM(S): 500 TABLET, FILM COATED ORAL at 08:29

## 2019-03-17 RX ADMIN — PANTOPRAZOLE SODIUM 40 MILLIGRAM(S): 20 TABLET, DELAYED RELEASE ORAL at 17:50

## 2019-03-17 RX ADMIN — Medication 125 MILLILITER(S): at 02:25

## 2019-03-17 RX ADMIN — Medication 50 MILLILITER(S): at 05:55

## 2019-03-17 RX ADMIN — PANTOPRAZOLE SODIUM 40 MILLIGRAM(S): 20 TABLET, DELAYED RELEASE ORAL at 05:56

## 2019-03-17 RX ADMIN — PIPERACILLIN AND TAZOBACTAM 25 GRAM(S): 4; .5 INJECTION, POWDER, LYOPHILIZED, FOR SOLUTION INTRAVENOUS at 13:44

## 2019-03-17 RX ADMIN — Medication 1: at 17:49

## 2019-03-17 RX ADMIN — FENTANYL CITRATE 1.42 MICROGRAM(S)/KG/HR: 50 INJECTION INTRAVENOUS at 21:35

## 2019-03-17 RX ADMIN — Medication 3: at 01:42

## 2019-03-17 RX ADMIN — PIPERACILLIN AND TAZOBACTAM 25 GRAM(S): 4; .5 INJECTION, POWDER, LYOPHILIZED, FOR SOLUTION INTRAVENOUS at 05:55

## 2019-03-17 NOTE — CHART NOTE - NSCHARTNOTEFT_GEN_A_CORE
Patient Age: 73y    Patient Gender: Female    Procedure (including site / side if known): IVC Filter     Diagnosis / Indication: Submassive PE, from: CT Angio Chest w/ IV Cont There is mild   straightening of the intraventricular septum with reflux of contrast into the IVC concerning for right heart strain.    Interventional Radiology Attending Physician: Dr. Martines     Ordering Attending Physician: Dr. Geiger     Pertinent Medical History:    PAST MEDICAL & SURGICAL HISTORY:  Raynauds disease  GERD (gastroesophageal reflux disease)  HLD (hyperlipidemia)  HTN (hypertension)  COPD (chronic obstructive pulmonary disease)  S/P left cataract extraction  History of tonsillectomy  History of rhinoplasty  History of hernia repair    Pertinent Labs:                            11.9   17.4  )-----------( 112      ( 17 Mar 2019 06:23 )             35.1       03-17    128<L>  |  92<L>  |  44<H>  ----------------------------<  132<H>  4.9   |  17<L>  |  1.39<H>    Ca    8.6      17 Mar 2019 06:23  Phos  4.5     03-17  Mg     2.2     03-17    TPro  5.9<L>  /  Alb  4.1  /  TBili  1.2  /  DBili  x   /  AST  731<H>  /  ALT  309<H>  /  AlkPhos  82  03-17      PT/INR - ( 17 Mar 2019 06:23 )   PT: 15.9 sec;   INR: 1.37 ratio         PTT - ( 17 Mar 2019 06:23 )  PTT:21.4 sec    Patient and Family aware:   [  ]Y   [  ]N Patient Age: 73y    Patient Gender: Female    Procedure (including site / side if known): IVC Filter     Diagnosis / Indication: Submassive PE, from: CT Angio Chest w/ IV Cont There is mild   straightening of the intraventricular septum with reflux of contrast into the IVC concerning for right heart strain.    Interventional Radiology Attending Physician: Dr. Martines     Ordering Attending Physician: Dr. Geiger     Pertinent Medical History:    73 F w/ pmh former smoker HTN, HLD, RA (colchicine), peripheral neuropathy who presents for decreased appetite, generalized weakness and confusion for 3 days. Patient is a clinic patient at [facility] and follow up with Dr. Lema in [facility]. Patient had a colonoscopy in dec/2018. Patient also recently returned from [place] 2/18/19. Patient is denied any chest pain, abdominal pain, sob, fever.  Patient is AxO2, no complaints. She has a history of hepatitis A, her hepatitis B and C were negative in 2015. Patient was found to have Extensive bilateral pulmonary emboli involving the lobar, segmental and subsegmental pulmonary arteries on CTA . Also found to have DVT's on dopplers. Was started on a heparin gtt and then had episode of hematemesis, requiring intubation. Now s/p EGD with findings of gastric mass. Currently off of anticoagulation.        PAST MEDICAL & SURGICAL HISTORY:  Raynauds disease  GERD (gastroesophageal reflux disease)  HLD (hyperlipidemia)  HTN (hypertension)  COPD (chronic obstructive pulmonary disease)  S/P left cataract extraction  History of tonsillectomy  History of rhinoplasty  History of hernia repair    Pertinent Labs:                            11.9   17.4  )-----------( 112      ( 17 Mar 2019 06:23 )             35.1       03-17    128<L>  |  92<L>  |  44<H>  ----------------------------<  132<H>  4.9   |  17<L>  |  1.39<H>    Ca    8.6      17 Mar 2019 06:23  Phos  4.5     03-17  Mg     2.2     03-17    TPro  5.9<L>  /  Alb  4.1  /  TBili  1.2  /  DBili  x   /  AST  731<H>  /  ALT  309<H>  /  AlkPhos  82  03-17      PT/INR - ( 17 Mar 2019 06:23 )   PT: 15.9 sec;   INR: 1.37 ratio         PTT - ( 17 Mar 2019 06:23 )  PTT:21.4 sec    Patient and Family aware:   [  ]Y   [  ]N

## 2019-03-17 NOTE — CHART NOTE - NSCHARTNOTEFT_GEN_A_CORE
GI Post procedure     EGD 3/16/2019    IMPRESSION  - Normal esophagus.   - Clotted blood in the entire stomach.   - Rule out malignancy, ulcerated gastric mass, with no active bleeding identified on the lesser curvature of the stomach and at the incisura.  Biopsied. This is the likely source of bleeding.   - Normal duodenal bulb and 2nd part of the duodenum.   - Visualisation was limited due to presence of blood and clots in the stomach.     RECOMMENDATION  - Observe patient in MICU for ongoing care.   - NPO.   - Use a proton pump inhibitor IV BID.   - Await pathology results.   - Okay to discontinue octreotide  - Anticoagulation as per primary team    Plan discussed with daughter at bedside.   Plan discussed with the MICU team      Elmer Meraz, PGY-5  GI fellow  B- [id number]/ [phone]  Please call GI fellow on call after 5pm and on weekends GI Post procedure     EGD 3/16/2019    IMPRESSION  - Normal esophagus.   - Clotted blood in the entire stomach.   - Rule out malignancy, ulcerated gastric mass, with no active bleeding identified on the lesser curvature of the stomach and at the incisura.  Biopsied. This is the likely source of bleeding.   - Normal duodenal bulb and 2nd part of the duodenum.   - Visualisation was limited due to presence of blood and clots in the stomach.     RECOMMENDATION  - Observe patient in MICU for ongoing care.   - NPO.   - Use a proton pump inhibitor IV BID.   - Await pathology results.   - Okay to discontinue octreotide  - Would hold anticoagulation for now and consider alternative for treatment of PE    Plan discussed with daughter at bedside.   Plan discussed with the MICU team      Elmer Meraz, PGY-5  GI fellow  B- [id number]/ [phone]  Please call GI fellow on call after 5pm and on weekends

## 2019-03-17 NOTE — PROGRESS NOTE ADULT - SUBJECTIVE AND OBJECTIVE BOX
Vascular & Interventional Radiology Post-Procedure Note    Pre-Procedure Diagnosis: PE  Post-Procedure Diagnosis: Same as pre.  Indications for Procedure: b/l DVT and b/l PE with contraindication to AC (Bleeding gastric mass)     Attending: Dr. Martines  Resident: Dr. Sam    Procedure Details/Findings: Venatech (Permanent) IVC filter placed with flouroscopiv guidance. thrombus noted in the distal IVC at the inflow fo the bilateral iliac veins.   Access (if applicable): R fem and R jugular    Complications: none  Estimated Blood Loss: Minimal  Specimen: none  Contrast: 64cc  Sedation: none  Patient Condition/Disposition: Stable, back to MICU    Plan:   - monitor access sites for bleeding. Vascular & Interventional Radiology Post-Procedure Note    Pre-Procedure Diagnosis: PE  Post-Procedure Diagnosis: Same as pre.  Indications for Procedure: b/l DVT and b/l PE with contraindication to AC (Bleeding gastric mass)     Attending: Dr. Martines  Resident: Dr. Sam    Procedure Details/Findings: Vena Tech (Permanent) IVC filter placed with fluoroscopic guidance. Thrombus noted in the distal IVC at the inflow of the bilateral iliac veins. IVC filter placed above the thrombus, noted to be stable in position on post deployment images. Findings discussed with MICU resident, Dr Jordan, present at the time of procedure.    Access (if applicable): R fem and R jugular    Complications: none  Estimated Blood Loss: Minimal  Specimen: none  Contrast: 64cc  Sedation: none  Patient Condition/Disposition: Stable, back to MICU    Plan:   - monitor access sites for bleeding.

## 2019-03-17 NOTE — PROGRESS NOTE ADULT - SUBJECTIVE AND OBJECTIVE BOX
Vascular & Interventional Radiology Pre-Procedure Note    Procedure Name: IVC filter placement    HPI: 73y Female with bleeding gastric mass, hepatic mets and evidence of peritoneal carcinomatosis with bilateral DVT and b/l PE. Patient is contraindicated for AC secondary to GI bleed.     Allergies:   Medications (Abx/Cardiac/Anticoagulation/Blood Products)  azithromycin  IVPB: 250 mL/Hr IV Intermittent (03-17 @ 08:29)  heparin  Infusion.: 1100 Unit(s)/Hr IV Continuous (03-16 @ 16:12)  heparin  Injectable: 4500 Unit(s) IV Push (03-16 @ 16:33)  piperacillin/tazobactam IVPB.: 25 mL/Hr IV Intermittent (03-17 @ 13:44)  piperacillin/tazobactam IVPB.: 200 mL/Hr IV Intermittent (03-16 @ 15:31)  protamine  IVPB: 220 mL/Hr IV Intermittent (03-16 @ 20:15)  vancomycin  IVPB: 250 mL/Hr IV Intermittent (03-16 @ 17:51)    Data:  157.48  56.6  T(C): 37  HR: 80  BP: 87/52  RR: 15  SpO2: 100%    -WBC 14.0 / HgB 10.5 / Hct 30.5 / Plt 78  -Na 130 / Cl 94 / BUN 45 / Glucose 149  -K 4.4 / CO2 18 / Cr 1.63  - / Alk Phos 98 / T.Bili 1.3  -INR1.36    Plan:   - 73y Female presents for IVC filter placement.   - Risks/Benefits/alternatives explained with the healthcare proxy and witnessed informed consent obtained.

## 2019-03-17 NOTE — PROGRESS NOTE ADULT - SUBJECTIVE AND OBJECTIVE BOX
CHIEF COMPLAINT:    Interval Events:      REVIEW OF SYSTEMS:  CONSTITUTIONAL: No weakness, fevers or chills  EYES/ENT: No visual changes;  No vertigo or throat pain   NECK: No pain or stiffness  RESPIRATORY: No cough, wheezing, hemoptysis; No shortness of breath  CARDIOVASCULAR: No chest pain or palpitations  GASTROINTESTINAL: No abdominal or epigastric pain. No nausea, vomiting, or hematemesis; No diarrhea or constipation. No melena or hematochezia.  GENITOURINARY: No dysuria, frequency or hematuria  NEUROLOGICAL: No numbness or weakness  SKIN: No itching, burning, rashes, or lesions   All other review of systems is negative unless indicated above.    OBJECTIVE:  ICU Vital Signs Last 24 Hrs  T(C): 36.9 (17 Mar 2019 04:00), Max: 36.9 (17 Mar 2019 04:00)  T(F): 98.5 (17 Mar 2019 04:00), Max: 98.5 (17 Mar 2019 04:00)  HR: 86 (17 Mar 2019 06:00) (81 - 117)  BP: 106/69 (17 Mar 2019 06:00) (103/57 - 185/76)  BP(mean): 79 (17 Mar 2019 06:00) (76 - 98)  ABP: --  ABP(mean): --  RR: 17 (17 Mar 2019 06:00) (16 - 26)  SpO2: 100% (17 Mar 2019 06:00) (83% - 100%)    Mode: AC/ CMV (Assist Control/ Continuous Mandatory Ventilation), RR (machine): 5, TV (machine): 450, FiO2: 60, PEEP: 5, ITime: 0.9, MAP: 17, PIP: 31    03-16 @ 07:01  -  03-17 @ 06:23  --------------------------------------------------------  IN: 777.4 mL / OUT: 325 mL / NET: 452.4 mL      CAPILLARY BLOOD GLUCOSE      POCT Blood Glucose.: 129 mg/dL (17 Mar 2019 05:35)    PHYSICAL EXAM:  GENERAL: NAD, well-developed  HEAD:  Atraumatic, Normocephalic  EYES: EOMI, PERRLA, conjunctiva and sclera clear  NECK: Supple, No JVD  CHEST/LUNG: Clear to auscultation bilaterally; No wheeze  HEART: Regular rate and rhythm; No murmurs, rubs, or gallops  ABDOMEN: Soft, Nontender, Nondistended; Bowel sounds present  EXTREMITIES:, No clubbing, cyanosis, or edema  PSYCH: AAOx3  NEUROLOGY: non-focal  SKIN: No rashes or lesions  LINES:    HOSPITAL MEDICATIONS:  Standing Meds:  albumin human 25% IVPB 100 milliLiter(s) IV Intermittent every 6 hours  chlorhexidine 2% Cloths 1 Application(s) Topical daily  chlorhexidine 4% Liquid 1 Application(s) Topical <User Schedule>  fentaNYL   Infusion. 0.5 MICROgram(s)/kG/Hr IV Continuous <Continuous>  insulin lispro (HumaLOG) corrective regimen sliding scale   SubCutaneous every 6 hours  midazolam Infusion 0.02 mG/kG/Hr IV Continuous <Continuous>  pantoprazole  Injectable 40 milliGRAM(s) IV Push two times a day  piperacillin/tazobactam IVPB. 3.375 Gram(s) IV Intermittent every 8 hours      PRN Meds:      LABS:                        13.6   18.5  )-----------( 120      ( 17 Mar 2019 00:47 )             39.8     Hgb Trend: 13.6<--, 13.3<--, 13.5<--  03-17    125<L>  |  93<L>  |  42<H>  ----------------------------<  294<H>  5.2   |  13<L>  |  1.27    Ca    8.5      17 Mar 2019 00:47  Phos  6.2     03-17  Mg     2.2     03-17    TPro  5.5<L>  /  Alb  3.5  /  TBili  1.1  /  DBili  x   /  AST  194<H>  /  ALT  84<H>  /  AlkPhos  87  03-17    Creatinine Trend: 1.27<--, 1.12<--, 1.12<--  PT/INR - ( 17 Mar 2019 00:47 )   PT: 17.0 sec;   INR: 1.46 ratio         PTT - ( 17 Mar 2019 00:47 )  PTT:26.4 sec  Urinalysis Basic - ( 16 Mar 2019 20:35 )    Color: Light Yellow / Appearance: Clear / SG: >1.050 / pH: x  Gluc: x / Ketone: Small  / Bili: Negative / Urobili: Negative   Blood: x / Protein: 30 mg/dL / Nitrite: Negative   Leuk Esterase: Negative / RBC: 6 /hpf / WBC 4 /HPF   Sq Epi: x / Non Sq Epi: 1 /hpf / Bacteria: Negative      Arterial Blood Gas:  03-16 @ 21:55  7.22/46/73/18/91/-9.3  ABG lactate: --    Venous Blood Gas:  03-16 @ 20:23  7.26/47/66/21/89  VBG Lactate: 1.9  Venous Blood Gas:  03-16 @ 18:44  7.37/41/23/23/30  VBG Lactate: 2.4  Venous Blood Gas:  03-16 @ 14:14  7.28/44/25/20/26  VBG Lactate: 6.8      MICROBIOLOGY:     RADIOLOGY:  [ ] Reviewed and interpreted by me    EKG: CHIEF COMPLAINT: Contacted IR for IVC filter. Was hypotensive shortly, Levo at standby. Was started on albumin overnight, now it has been dc .  WBC count elevated.     REVIEW OF SYSTEMS:  CONSTITUTIONAL: No weakness, fevers or chills  EYES/ENT: No visual changes;  No vertigo or throat pain   NECK: No pain or stiffness  RESPIRATORY: No cough, wheezing, hemoptysis; No shortness of breath  CARDIOVASCULAR: No chest pain or palpitations  GASTROINTESTINAL: No abdominal or epigastric pain. No nausea, vomiting, or hematemesis; No diarrhea or constipation. No melena or hematochezia.  GENITOURINARY: No dysuria, frequency or hematuria  NEUROLOGICAL: No numbness or weakness  SKIN: No itching, burning, rashes, or lesions   All other review of systems is negative unless indicated above.    OBJECTIVE:  ICU Vital Signs Last 24 Hrs  T(C): 36.9 (17 Mar 2019 04:00), Max: 36.9 (17 Mar 2019 04:00)  T(F): 98.5 (17 Mar 2019 04:00), Max: 98.5 (17 Mar 2019 04:00)  HR: 86 (17 Mar 2019 06:00) (81 - 117)  BP: 106/69 (17 Mar 2019 06:00) (103/57 - 185/76)  BP(mean): 79 (17 Mar 2019 06:00) (76 - 98)  ABP: --  ABP(mean): --  RR: 17 (17 Mar 2019 06:00) (16 - 26)  SpO2: 100% (17 Mar 2019 06:00) (83% - 100%)    Mode: AC/ CMV (Assist Control/ Continuous Mandatory Ventilation), RR (machine): 5, TV (machine): 450, FiO2: 60, PEEP: 5, ITime: 0.9, MAP: 17, PIP: 31    03-16 @ 07:01  -  03-17 @ 06:23  --------------------------------------------------------  IN: 777.4 mL / OUT: 325 mL / NET: 452.4 mL      CAPILLARY BLOOD GLUCOSE      POCT Blood Glucose.: 129 mg/dL (17 Mar 2019 05:35)    PHYSICAL EXAM:  GENERAL: NAD, well-developed  HEAD:  Atraumatic, Normocephalic  EYES: EOMI, PERRLA, conjunctiva and sclera clear  NECK: Supple, No JVD  CHEST/LUNG: Clear to auscultation bilaterally; No wheeze  HEART: Regular rate and rhythm; No murmurs, rubs, or gallops  ABDOMEN: Soft, Nontender, Nondistended; Bowel sounds present  EXTREMITIES:, No clubbing, cyanosis, or edema  PSYCH: AAOx3  NEUROLOGY: non-focal  SKIN: No rashes or lesions  LINES:    HOSPITAL MEDICATIONS:  Standing Meds:  albumin human 25% IVPB 100 milliLiter(s) IV Intermittent every 6 hours  chlorhexidine 2% Cloths 1 Application(s) Topical daily  chlorhexidine 4% Liquid 1 Application(s) Topical <User Schedule>  fentaNYL   Infusion. 0.5 MICROgram(s)/kG/Hr IV Continuous <Continuous>  insulin lispro (HumaLOG) corrective regimen sliding scale   SubCutaneous every 6 hours  midazolam Infusion 0.02 mG/kG/Hr IV Continuous <Continuous>  pantoprazole  Injectable 40 milliGRAM(s) IV Push two times a day  piperacillin/tazobactam IVPB. 3.375 Gram(s) IV Intermittent every 8 hours      PRN Meds:      LABS:                        13.6   18.5  )-----------( 120      ( 17 Mar 2019 00:47 )             39.8     Hgb Trend: 13.6<--, 13.3<--, 13.5<--  03-17    125<L>  |  93<L>  |  42<H>  ----------------------------<  294<H>  5.2   |  13<L>  |  1.27    Ca    8.5      17 Mar 2019 00:47  Phos  6.2     03-17  Mg     2.2     03-17    TPro  5.5<L>  /  Alb  3.5  /  TBili  1.1  /  DBili  x   /  AST  194<H>  /  ALT  84<H>  /  AlkPhos  87  03-17    Creatinine Trend: 1.27<--, 1.12<--, 1.12<--  PT/INR - ( 17 Mar 2019 00:47 )   PT: 17.0 sec;   INR: 1.46 ratio         PTT - ( 17 Mar 2019 00:47 )  PTT:26.4 sec  Urinalysis Basic - ( 16 Mar 2019 20:35 )    Color: Light Yellow / Appearance: Clear / SG: >1.050 / pH: x  Gluc: x / Ketone: Small  / Bili: Negative / Urobili: Negative   Blood: x / Protein: 30 mg/dL / Nitrite: Negative   Leuk Esterase: Negative / RBC: 6 /hpf / WBC 4 /HPF   Sq Epi: x / Non Sq Epi: 1 /hpf / Bacteria: Negative      Arterial Blood Gas:  03-16 @ 21:55  7.22/46/73/18/91/-9.3  ABG lactate: --    Venous Blood Gas:  03-16 @ 20:23  7.26/47/66/21/89  VBG Lactate: 1.9  Venous Blood Gas:  03-16 @ 18:44  7.37/41/23/23/30  VBG Lactate: 2.4  Venous Blood Gas:  03-16 @ 14:14  7.28/44/25/20/26  VBG Lactate: 6.8      MICROBIOLOGY:     RADIOLOGY:  [ ] Reviewed and interpreted by me    EKG:

## 2019-03-17 NOTE — PROGRESS NOTE ADULT - ASSESSMENT
73 F w/ pmh former smoker HTN, HLD, RA (colchicine), peripheral neuropathy who presents for decreased appetite, generalized weakness and confusion for 3 days. Patient is a clinic patient at 865 and follow up with Dr. Lema in [facility]. Patient had a colonoscopy in dec/2018. Patient also recently returned from [place] 2/18/19. Patient is denied any chest pain, abdominal pain, sob, fever.  Patient is AxO2, no complaints. She has a history of hepatitis A, her hepatitis B and C were negative in 2015.     #Neuro  -intubated/sedated for airway protection in the setting of GIB  -check ammonia  Hx of peripheral neuropathy    #Cards  troponins downtrended from 36 to 33  ProBNP  -heparin drip for PE stopped in setting of acute bleed  -will check TTE and LE duplex; may require IVCF    #resp  Intubated for airway protection in setting of UGI bleed, hematemesis  PE  - Pulmonary emboli likely from hypercoagulable state from malignancy.    - Add on pro-bnp.   - LE dopplers   - hold heparin drip, reverse due to acute bleed with protamine  - Telemetry   - Maintain Goal sat 90%     #GI  UGIB and hematemesis  -intubated for airway protection, stop AC  -NPO  -GI c/s  -octreotide + ppi  -protamine to reverse heparin  Liver lesions  - Malignancy work up as per primary team.   - Likely may need sample of ascites.     #Renal  -BUN/Cr sl elevated compared to 1 year prior  -monitor, may worsen in setting of acute blood loss anemia/low volume state  -avoid nephrotoxins       #ID  -recent travel to [place]  -hx of hepatitis A  -received vanc and zosyn in ED  -WBC elevated with 91% neutrophils, afebrile but tachycardic to 92 prior to bleeding  -blood cultures in ED pending  possible sources include ascites however abd exam ok, pna with LLL opacity on CXR  -can cont vanc, zosyn, plus azithro for CAP and f/u urine legionella and blood cultures      #VTE  -check LE duplex; no SCD until r/o DVT    #GOC  - Spoke with patient whom endorsed daughter as surrogate decision maker 73 F w/ pmh former smoker HTN, HLD, RA (colchicine), peripheral neuropathy who presents for decreased appetite, generalized weakness and confusion for 3 days. Patient is a clinic patient at 865 and follow up with Dr. Lema in [facility]. Patient had a colonoscopy in dec/2018. Patient also recently returned from [place] 2/18/19. Patient is denied any chest pain, abdominal pain, sob, fever.  Patient is AxO2, no complaints. She has a history of hepatitis A, her hepatitis B and C were negative in 2015.     #Neuro  -intubated/sedated for airway protection in the setting of GIB  - Ammonia level of 51   Hx of peripheral neuropathy    #Cards  -Troponins downtrended from 36 to 33, proBNP  -heparin drip for PE stopped in setting of acute bleed  -will check TTE and LE duplex; may require IVCF; previous echo from 2014 with EF of 35%    #resp  Intubated for airway protection in setting of UGI bleed, hematemesis  PE  - Pulmonary emboli likely from hypercoagulable state from malignancy.    - Add on pro-bnp.   - LE dopplers - no evidence of UPE thrombus   - hold heparin drip, reverse due to acute bleed with protamine  - Telemetry   - Maintain Goal sat 90%     #GI  UGIB and hematemesis  -intubated for airway protection, stop AC  -NPO  -GI c/s  -octreotide has been d/c;  ppi BID   - s/p protamine to reverse heparin  Liver lesions  - Malignancy work up as per primary team.   - Likely may need sample of ascites.     #Renal  -BUN/Cr sl elevated compared to 1 year prior  -monitor, may worsen in setting of acute blood loss anemia/low volume state  -avoid nephrotoxins   - renally dose meds       #ID  -recent travel to [place]  -hx of hepatitis A  -received vanc and zosyn in ED  -WBC elevated with 91% neutrophils, afebrile but tachycardic to 92 prior to bleeding  -blood cultures in ED pending  possible sources include ascites however abd exam ok, pna with LLL opacity on CXR  -can cont vanc, zosyn, plus azithro for CAP and f/u urine legionella and blood cultures      #VTE  -check LE duplex; no SCD until r/o DVT    #GOC  - Spoke with patient whom endorsed daughter as surrogate decision maker 73 F w/ pmh former smoker HTN, HLD, RA (colchicine), peripheral neuropathy who presents for decreased appetite, generalized weakness and confusion for 3 days. Patient is a clinic patient at 865 and follow up with Dr. Lema in [facility]. Patient had a colonoscopy in dec/2018. Patient also recently returned from [place] 2/18/19. Patient is denied any chest pain, abdominal pain, sob, fever.  Patient is AxO2, no complaints. She has a history of hepatitis A, her hepatitis B and C were negative in 2015.     #Neuro  -intubated/sedated for airway protection in the setting of GIB  - Ammonia level of 51   Hx of peripheral neuropathy    #Cards  -Troponins downtrended from 36 to 33, proBNP  -heparin drip for PE stopped in setting of acute bleed  -will check TTE and LE duplex; may require IVCF; previous echo from 2014 with EF of 35%    #resp  Intubated for airway protection in setting of UGI bleed, hematemesis  PE  - Pulmonary emboli likely from hypercoagulable state from malignancy.    - Add on pro-bnp.   - LE dopplers - no evidence of UPE thrombus   - hold heparin drip, reverse due to acute bleed with protamine  - Telemetry   - Maintain Goal sat 90%     #GI  UGIB and hematemesis  -intubated for airway protection, stop AC  -NPO  -s/p EGD with findings of ulcerated mass and clotted blood in the stomach  -octreotide has been d/c;  ppi BID   - s/p protamine to reverse heparin  Liver lesions  - Malignancy work up as per primary team.   - Likely may need sample of ascites.     #Renal  -BUN/Cr sl elevated compared to 1 year prior  -monitor, may worsen in setting of acute blood loss anemia/low volume state  -avoid nephrotoxins   - renally dose meds       #ID  -recent travel to [place]  -hx of hepatitis A  -received vanc and zosyn in ED  -WBC elevated with 91% neutrophils, afebrile but tachycardic to 92 prior to bleeding  -blood cultures in ED pending  possible sources include ascites however abd exam ok, pna with LLL opacity on CXR  -can cont vanc, zosyn, plus azithro for CAP and f/u urine legionella and blood cultures      #VTE  -check LE duplex; no SCD until r/o DVT    #GOC  - Spoke with patient whom endorsed daughter as surrogate decision maker 73 F w/ pmh former smoker HTN, HLD, RA (colchicine), peripheral neuropathy who presents for decreased appetite, generalized weakness and confusion for 3 days. Patient is a clinic patient at 865 and follow up with Dr. Lema in [facility]. Patient had a colonoscopy in dec/2018. Patient also recently returned from [place] 2/18/19. Patient is denied any chest pain, abdominal pain, sob, fever.  Patient is AxO2, no complaints. She has a history of hepatitis A, her hepatitis B and C were negative in 2015.     #Neuro  -intubated/sedated for airway protection in the setting of GIB  - Ammonia level of 51   Hx of peripheral neuropathy    #Cards  -Troponins downtrended from 36 to 33, proBNP pending   -heparin drip for PE stopped in setting of acute bleed  -will check TTE and LE duplex; may require IVCF; previous echo from 2014 with EF of 35%  - levo at standby, 2/2 to drop in blood pressure to 60's systolic, now currently mapping 71 off of pressors     #resp  Intubated for airway protection in setting of UGI bleed, hematemesis  PE  - Pulmonary emboli likely from hypercoagulable state from malignancy.    - LE dopplers -  with evidence of lower extremity DVT   - hold heparin drip, reverse due to acute bleed with protamine  - IR consult in place for submassive PE  - Will avoid IV fluids given decreased preload and decreased cardiac output   - Maintain Goal sat 90%     #GI  UGIB and hematemesis  -intubated for airway protection, stop AC  -NPO  - s/p EGD with findings of ulcerated mass and clotted blood in the stomach  - now on  ppi BID   - Biopsies pending s/p EGD   - Likely may need sample of ascites.   - Dc albumin as patient is not currently undergoing large volume paracentesis nor is in the state of hepatorenal syndrome     #Renal  -BUN/Cr sl elevated compared to 1 year prior  -monitor, may worsen in setting of acute blood loss anemia/low volume state  -avoid nephrotoxins   - renally dose meds       #ID  -recent travel to [place]  -hx of hepatitis A  -received vanc and zosyn in ED  -WBC elevated with 91% neutrophils, afebrile but tachycardic to 92 prior to bleeding  -blood cultures in ED pending  possible sources include ascites however abd exam ok, pna with LLL opacity on CXR  -can cont vanc, zosyn, plus azithro for CAP and f/u urine legionella and blood cultures      #VTE  - DVT's b/l of right and left popliteal veins likely in the setting of malignancy   #GOC  - Spoke with patient whom endorsed daughter as surrogate decision maker

## 2019-03-18 LAB
AFP-TM SERPL-MCNC: 23.2 NG/ML — HIGH
ALBUMIN SERPL ELPH-MCNC: 3.6 G/DL — SIGNIFICANT CHANGE UP (ref 3.3–5)
ALBUMIN SERPL ELPH-MCNC: 3.6 G/DL — SIGNIFICANT CHANGE UP (ref 3.3–5)
ALBUMIN SERPL ELPH-MCNC: 3.8 G/DL — SIGNIFICANT CHANGE UP (ref 3.3–5)
ALP SERPL-CCNC: 102 U/L — SIGNIFICANT CHANGE UP (ref 40–120)
ALP SERPL-CCNC: 110 U/L — SIGNIFICANT CHANGE UP (ref 40–120)
ALP SERPL-CCNC: 117 U/L — SIGNIFICANT CHANGE UP (ref 40–120)
ALT FLD-CCNC: 271 U/L — HIGH (ref 10–45)
ALT FLD-CCNC: 285 U/L — HIGH (ref 10–45)
ALT FLD-CCNC: 297 U/L — HIGH (ref 10–45)
AMMONIA BLD-MCNC: 30 UMOL/L — SIGNIFICANT CHANGE UP (ref 11–55)
ANION GAP SERPL CALC-SCNC: 16 MMOL/L — SIGNIFICANT CHANGE UP (ref 5–17)
ANION GAP SERPL CALC-SCNC: 17 MMOL/L — SIGNIFICANT CHANGE UP (ref 5–17)
ANION GAP SERPL CALC-SCNC: 18 MMOL/L — HIGH (ref 5–17)
APTT BLD: 25.8 SEC — LOW (ref 27.5–36.3)
APTT BLD: 26.2 SEC — LOW (ref 27.5–36.3)
APTT BLD: 27.5 SEC — SIGNIFICANT CHANGE UP (ref 27.5–36.3)
AST SERPL-CCNC: 373 U/L — HIGH (ref 10–40)
AST SERPL-CCNC: 532 U/L — HIGH (ref 10–40)
AST SERPL-CCNC: 779 U/L — HIGH (ref 10–40)
BASOPHILS # BLD AUTO: 0 K/UL — SIGNIFICANT CHANGE UP (ref 0–0.2)
BILIRUB SERPL-MCNC: 1 MG/DL — SIGNIFICANT CHANGE UP (ref 0.2–1.2)
BILIRUB SERPL-MCNC: 1.1 MG/DL — SIGNIFICANT CHANGE UP (ref 0.2–1.2)
BILIRUB SERPL-MCNC: 1.1 MG/DL — SIGNIFICANT CHANGE UP (ref 0.2–1.2)
BUN SERPL-MCNC: 49 MG/DL — HIGH (ref 7–23)
BUN SERPL-MCNC: 50 MG/DL — HIGH (ref 7–23)
BUN SERPL-MCNC: 54 MG/DL — HIGH (ref 7–23)
BURR CELLS BLD QL SMEAR: PRESENT — SIGNIFICANT CHANGE UP
CALCIUM SERPL-MCNC: 7.8 MG/DL — LOW (ref 8.4–10.5)
CALCIUM SERPL-MCNC: 7.9 MG/DL — LOW (ref 8.4–10.5)
CALCIUM SERPL-MCNC: 7.9 MG/DL — LOW (ref 8.4–10.5)
CANCER AG125 SERPL-ACNC: 257 U/ML — HIGH
CANCER AG19-9 SERPL-ACNC: HIGH U/ML
CEA SERPL-MCNC: 54.7 NG/ML — HIGH (ref 0–3.8)
CHLORIDE SERPL-SCNC: 92 MMOL/L — LOW (ref 96–108)
CHLORIDE SERPL-SCNC: 92 MMOL/L — LOW (ref 96–108)
CHLORIDE SERPL-SCNC: 94 MMOL/L — LOW (ref 96–108)
CK SERPL-CCNC: 279 U/L — HIGH (ref 25–170)
CO2 SERPL-SCNC: 18 MMOL/L — LOW (ref 22–31)
CREAT ?TM UR-MCNC: 125 MG/DL — SIGNIFICANT CHANGE UP
CREAT SERPL-MCNC: 1.76 MG/DL — HIGH (ref 0.5–1.3)
CREAT SERPL-MCNC: 2.03 MG/DL — HIGH (ref 0.5–1.3)
CREAT SERPL-MCNC: 2.23 MG/DL — HIGH (ref 0.5–1.3)
EOSINOPHIL # BLD AUTO: 0.2 K/UL — SIGNIFICANT CHANGE UP (ref 0–0.5)
GAS PNL BLDA: SIGNIFICANT CHANGE UP
GIANT PLATELETS BLD QL SMEAR: PRESENT — SIGNIFICANT CHANGE UP
GLUCOSE BLDC GLUCOMTR-MCNC: 142 MG/DL — HIGH (ref 70–99)
GLUCOSE BLDC GLUCOMTR-MCNC: 164 MG/DL — HIGH (ref 70–99)
GLUCOSE BLDC GLUCOMTR-MCNC: 209 MG/DL — HIGH (ref 70–99)
GLUCOSE SERPL-MCNC: 160 MG/DL — HIGH (ref 70–99)
GLUCOSE SERPL-MCNC: 188 MG/DL — HIGH (ref 70–99)
GLUCOSE SERPL-MCNC: 232 MG/DL — HIGH (ref 70–99)
HCT VFR BLD CALC: 28.9 % — LOW (ref 34.5–45)
HCT VFR BLD CALC: 30.2 % — LOW (ref 34.5–45)
HCT VFR BLD CALC: 31.1 % — LOW (ref 34.5–45)
HCT VFR BLD CALC: 31.7 % — LOW (ref 34.5–45)
HGB BLD-MCNC: 10.1 G/DL — LOW (ref 11.5–15.5)
HGB BLD-MCNC: 10.3 G/DL — LOW (ref 11.5–15.5)
HGB BLD-MCNC: 10.8 G/DL — LOW (ref 11.5–15.5)
HGB BLD-MCNC: 11 G/DL — LOW (ref 11.5–15.5)
INR BLD: 1.26 RATIO — HIGH (ref 0.88–1.16)
INR BLD: 1.32 RATIO — HIGH (ref 0.88–1.16)
INR BLD: 1.34 RATIO — HIGH (ref 0.88–1.16)
LEGIONELLA AG UR QL: NEGATIVE — SIGNIFICANT CHANGE UP
LYMPHOCYTES # BLD AUTO: 1 K/UL — SIGNIFICANT CHANGE UP (ref 1–3.3)
LYMPHOCYTES # BLD AUTO: 4 % — LOW (ref 13–44)
MAGNESIUM SERPL-MCNC: 2 MG/DL — SIGNIFICANT CHANGE UP (ref 1.6–2.6)
MCHC RBC-ENTMCNC: 30.3 PG — SIGNIFICANT CHANGE UP (ref 27–34)
MCHC RBC-ENTMCNC: 30.9 PG — SIGNIFICANT CHANGE UP (ref 27–34)
MCHC RBC-ENTMCNC: 31.1 PG — SIGNIFICANT CHANGE UP (ref 27–34)
MCHC RBC-ENTMCNC: 32.2 PG — SIGNIFICANT CHANGE UP (ref 27–34)
MCHC RBC-ENTMCNC: 33.5 GM/DL — SIGNIFICANT CHANGE UP (ref 32–36)
MCHC RBC-ENTMCNC: 34.6 GM/DL — SIGNIFICANT CHANGE UP (ref 32–36)
MCHC RBC-ENTMCNC: 34.6 GM/DL — SIGNIFICANT CHANGE UP (ref 32–36)
MCHC RBC-ENTMCNC: 35.7 GM/DL — SIGNIFICANT CHANGE UP (ref 32–36)
MCV RBC AUTO: 89.3 FL — SIGNIFICANT CHANGE UP (ref 80–100)
MCV RBC AUTO: 90 FL — SIGNIFICANT CHANGE UP (ref 80–100)
MCV RBC AUTO: 90.2 FL — SIGNIFICANT CHANGE UP (ref 80–100)
MCV RBC AUTO: 90.3 FL — SIGNIFICANT CHANGE UP (ref 80–100)
MONOCYTES # BLD AUTO: 1.2 K/UL — HIGH (ref 0–0.9)
MONOCYTES NFR BLD AUTO: 11 % — SIGNIFICANT CHANGE UP (ref 2–14)
NEUTROPHILS # BLD AUTO: 16.5 K/UL — HIGH (ref 1.8–7.4)
NEUTROPHILS NFR BLD AUTO: 76 % — SIGNIFICANT CHANGE UP (ref 43–77)
NEUTS BAND # BLD: 9 % — HIGH (ref 0–8)
NRBC # BLD: 1 /100 — HIGH (ref 0–0)
NT-PROBNP SERPL-SCNC: 3044 PG/ML — HIGH (ref 0–300)
OSMOLALITY SERPL: 286 MOS/KG — SIGNIFICANT CHANGE UP (ref 275–300)
OSMOLALITY UR: 385 MOS/KG — SIGNIFICANT CHANGE UP (ref 300–900)
PHOSPHATE SERPL-MCNC: 3.3 MG/DL — SIGNIFICANT CHANGE UP (ref 2.5–4.5)
PHOSPHATE SERPL-MCNC: 3.5 MG/DL — SIGNIFICANT CHANGE UP (ref 2.5–4.5)
PHOSPHATE SERPL-MCNC: 3.8 MG/DL — SIGNIFICANT CHANGE UP (ref 2.5–4.5)
PLAT MORPH BLD: ABNORMAL
PLATELET # BLD AUTO: 77 K/UL — LOW (ref 150–400)
PLATELET # BLD AUTO: 83 K/UL — LOW (ref 150–400)
PLATELET # BLD AUTO: 87 K/UL — LOW (ref 150–400)
PLATELET # BLD AUTO: 98 K/UL — LOW (ref 150–400)
POLYCHROMASIA BLD QL SMEAR: SLIGHT — SIGNIFICANT CHANGE UP
POTASSIUM SERPL-MCNC: 4.4 MMOL/L — SIGNIFICANT CHANGE UP (ref 3.5–5.3)
POTASSIUM SERPL-MCNC: 4.4 MMOL/L — SIGNIFICANT CHANGE UP (ref 3.5–5.3)
POTASSIUM SERPL-MCNC: 4.7 MMOL/L — SIGNIFICANT CHANGE UP (ref 3.5–5.3)
POTASSIUM SERPL-SCNC: 4.4 MMOL/L — SIGNIFICANT CHANGE UP (ref 3.5–5.3)
POTASSIUM SERPL-SCNC: 4.4 MMOL/L — SIGNIFICANT CHANGE UP (ref 3.5–5.3)
POTASSIUM SERPL-SCNC: 4.7 MMOL/L — SIGNIFICANT CHANGE UP (ref 3.5–5.3)
PROLACTIN SERPL-MCNC: 37 NG/ML — HIGH (ref 3.4–24.1)
PROT SERPL-MCNC: 5.2 G/DL — LOW (ref 6–8.3)
PROT SERPL-MCNC: 5.6 G/DL — LOW (ref 6–8.3)
PROT SERPL-MCNC: 5.7 G/DL — LOW (ref 6–8.3)
PROTHROM AB SERPL-ACNC: 14.5 SEC — HIGH (ref 10–12.9)
PROTHROM AB SERPL-ACNC: 15.3 SEC — HIGH (ref 10–12.9)
PROTHROM AB SERPL-ACNC: 15.4 SEC — HIGH (ref 10–12.9)
RBC # BLD: 3.21 M/UL — LOW (ref 3.8–5.2)
RBC # BLD: 3.34 M/UL — LOW (ref 3.8–5.2)
RBC # BLD: 3.49 M/UL — LOW (ref 3.8–5.2)
RBC # BLD: 3.52 M/UL — LOW (ref 3.8–5.2)
RBC # FLD: 13.4 % — SIGNIFICANT CHANGE UP (ref 10.3–14.5)
RBC # FLD: 13.8 % — SIGNIFICANT CHANGE UP (ref 10.3–14.5)
RBC # FLD: 13.8 % — SIGNIFICANT CHANGE UP (ref 10.3–14.5)
RBC # FLD: 13.9 % — SIGNIFICANT CHANGE UP (ref 10.3–14.5)
RBC BLD AUTO: ABNORMAL
SODIUM SERPL-SCNC: 126 MMOL/L — LOW (ref 135–145)
SODIUM SERPL-SCNC: 128 MMOL/L — LOW (ref 135–145)
SODIUM SERPL-SCNC: 129 MMOL/L — LOW (ref 135–145)
SODIUM UR-SCNC: <20 MMOL/L — SIGNIFICANT CHANGE UP
UUN UR-MCNC: 327 MG/DL — SIGNIFICANT CHANGE UP
WBC # BLD: 15.4 K/UL — HIGH (ref 3.8–10.5)
WBC # BLD: 17.8 K/UL — HIGH (ref 3.8–10.5)
WBC # BLD: 18.9 K/UL — HIGH (ref 3.8–10.5)
WBC # BLD: 19.7 K/UL — HIGH (ref 3.8–10.5)
WBC # FLD AUTO: 15.4 K/UL — HIGH (ref 3.8–10.5)
WBC # FLD AUTO: 17.8 K/UL — HIGH (ref 3.8–10.5)
WBC # FLD AUTO: 18.9 K/UL — HIGH (ref 3.8–10.5)
WBC # FLD AUTO: 19.7 K/UL — HIGH (ref 3.8–10.5)

## 2019-03-18 PROCEDURE — 99231 SBSQ HOSP IP/OBS SF/LOW 25: CPT

## 2019-03-18 PROCEDURE — 99232 SBSQ HOSP IP/OBS MODERATE 35: CPT | Mod: GC

## 2019-03-18 PROCEDURE — 99291 CRITICAL CARE FIRST HOUR: CPT

## 2019-03-18 PROCEDURE — 70450 CT HEAD/BRAIN W/O DYE: CPT | Mod: 26

## 2019-03-18 RX ORDER — CEFTRIAXONE 500 MG/1
1 INJECTION, POWDER, FOR SOLUTION INTRAMUSCULAR; INTRAVENOUS EVERY 24 HOURS
Qty: 0 | Refills: 0 | Status: DISCONTINUED | OUTPATIENT
Start: 2019-03-19 | End: 2019-03-22

## 2019-03-18 RX ORDER — CALCIUM GLUCONATE 100 MG/ML
2 VIAL (ML) INTRAVENOUS ONCE
Qty: 0 | Refills: 0 | Status: COMPLETED | OUTPATIENT
Start: 2019-03-18 | End: 2019-03-18

## 2019-03-18 RX ORDER — SODIUM CHLORIDE 9 MG/ML
500 INJECTION INTRAMUSCULAR; INTRAVENOUS; SUBCUTANEOUS ONCE
Qty: 0 | Refills: 0 | Status: COMPLETED | OUTPATIENT
Start: 2019-03-18 | End: 2019-03-18

## 2019-03-18 RX ORDER — ALBUMIN HUMAN 25 %
250 VIAL (ML) INTRAVENOUS ONCE
Qty: 0 | Refills: 0 | Status: COMPLETED | OUTPATIENT
Start: 2019-03-18 | End: 2019-03-18

## 2019-03-18 RX ORDER — CHLORHEXIDINE GLUCONATE 213 G/1000ML
1 SOLUTION TOPICAL
Qty: 0 | Refills: 0 | Status: DISCONTINUED | OUTPATIENT
Start: 2019-03-18 | End: 2019-03-20

## 2019-03-18 RX ORDER — CEFTRIAXONE 500 MG/1
INJECTION, POWDER, FOR SOLUTION INTRAMUSCULAR; INTRAVENOUS
Qty: 0 | Refills: 0 | Status: DISCONTINUED | OUTPATIENT
Start: 2019-03-18 | End: 2019-03-22

## 2019-03-18 RX ORDER — HEPARIN SODIUM 5000 [USP'U]/ML
5000 INJECTION INTRAVENOUS; SUBCUTANEOUS EVERY 8 HOURS
Qty: 0 | Refills: 0 | Status: DISCONTINUED | OUTPATIENT
Start: 2019-03-18 | End: 2019-03-20

## 2019-03-18 RX ORDER — CEFTRIAXONE 500 MG/1
1 INJECTION, POWDER, FOR SOLUTION INTRAMUSCULAR; INTRAVENOUS ONCE
Qty: 0 | Refills: 0 | Status: COMPLETED | OUTPATIENT
Start: 2019-03-18 | End: 2019-03-18

## 2019-03-18 RX ORDER — SODIUM CHLORIDE 9 MG/ML
1000 INJECTION INTRAMUSCULAR; INTRAVENOUS; SUBCUTANEOUS
Qty: 0 | Refills: 0 | Status: DISCONTINUED | OUTPATIENT
Start: 2019-03-18 | End: 2019-03-18

## 2019-03-18 RX ADMIN — Medication 1: at 06:46

## 2019-03-18 RX ADMIN — CEFTRIAXONE 100 GRAM(S): 500 INJECTION, POWDER, FOR SOLUTION INTRAMUSCULAR; INTRAVENOUS at 11:21

## 2019-03-18 RX ADMIN — SODIUM CHLORIDE 1000 MILLILITER(S): 9 INJECTION INTRAMUSCULAR; INTRAVENOUS; SUBCUTANEOUS at 03:40

## 2019-03-18 RX ADMIN — Medication 200 GRAM(S): at 15:04

## 2019-03-18 RX ADMIN — AZITHROMYCIN 250 MILLIGRAM(S): 500 TABLET, FILM COATED ORAL at 06:47

## 2019-03-18 RX ADMIN — CHLORHEXIDINE GLUCONATE 1 APPLICATION(S): 213 SOLUTION TOPICAL at 17:13

## 2019-03-18 RX ADMIN — Medication 2: at 00:48

## 2019-03-18 RX ADMIN — DEXMEDETOMIDINE HYDROCHLORIDE IN 0.9% SODIUM CHLORIDE 9.9 MICROGRAM(S)/KG/HR: 4 INJECTION INTRAVENOUS at 02:03

## 2019-03-18 RX ADMIN — PANTOPRAZOLE SODIUM 40 MILLIGRAM(S): 20 TABLET, DELAYED RELEASE ORAL at 05:22

## 2019-03-18 RX ADMIN — PIPERACILLIN AND TAZOBACTAM 25 GRAM(S): 4; .5 INJECTION, POWDER, LYOPHILIZED, FOR SOLUTION INTRAVENOUS at 05:22

## 2019-03-18 RX ADMIN — Medication 125 MILLILITER(S): at 19:41

## 2019-03-18 RX ADMIN — Medication 5.31 MICROGRAM(S)/KG/MIN: at 04:00

## 2019-03-18 RX ADMIN — HEPARIN SODIUM 5000 UNIT(S): 5000 INJECTION INTRAVENOUS; SUBCUTANEOUS at 13:01

## 2019-03-18 RX ADMIN — Medication 1: at 13:02

## 2019-03-18 RX ADMIN — Medication 5.31 MICROGRAM(S)/KG/MIN: at 19:41

## 2019-03-18 RX ADMIN — Medication 5.31 MICROGRAM(S)/KG/MIN: at 17:12

## 2019-03-18 RX ADMIN — SODIUM CHLORIDE 75 MILLILITER(S): 9 INJECTION INTRAMUSCULAR; INTRAVENOUS; SUBCUTANEOUS at 02:03

## 2019-03-18 RX ADMIN — PANTOPRAZOLE SODIUM 40 MILLIGRAM(S): 20 TABLET, DELAYED RELEASE ORAL at 17:12

## 2019-03-18 RX ADMIN — CHLORHEXIDINE GLUCONATE 1 APPLICATION(S): 213 SOLUTION TOPICAL at 05:22

## 2019-03-18 RX ADMIN — HEPARIN SODIUM 5000 UNIT(S): 5000 INJECTION INTRAVENOUS; SUBCUTANEOUS at 23:01

## 2019-03-18 NOTE — PROGRESS NOTE ADULT - ASSESSMENT
73 year old female with COPD (former smoker), HTN, HLD, RA (on colchicine), peripheral neuropathy who presented to the emergency room with chief complain of decreased appetite, generalized weakness and confusion for 3 days.     IMPRESSION  - Hematemesis s/p EGD on 3/16/2019 with presence of a large ulcerated mass in the stomach, biopsed  - Extensive Pulmonary Embolus, previously on heparin drip, currently on hold due to massive GI bleeding, s/p IVC filter on 3/17/2019  - Abnormal CT with innumerable hypodense nodules in the liver, concerning for metastatic disease. Also with moderate volume ascites with peritoneal nodularity concerning for peritoneal carcinomatosis    RECOMMENDATION    - trend CBC, CMP, INR  - continue pantoprazole 40mg IV BID  - continue Ceftriaxone 1gm IV Qday   - pending biopsy results from EGD 3/16/2019  - pending CEA, CA 19-9, AFP,   - supportive care as per primary team      Elmer Meraz, PGY-5  GI fellow  B- [id number]/ [phone]  Please call GI fellow on call after 5pm and on weekends 73 year old female with COPD (former smoker), HTN, HLD, RA (on colchicine), peripheral neuropathy who presented to the emergency room with chief complain of decreased appetite, generalized weakness and confusion for 3 days.     IMPRESSION  - Hematemesis s/p EGD on 3/16/2019 with presence of a large ulcerated mass in the stomach, biopsed  - Extensive Pulmonary Embolus, previously on heparin drip, currently on hold due to massive GI bleeding, s/p IVC filter on 3/17/2019  - Abnormal CT with innumerable hypodense nodules in the liver, concerning for metastatic disease. Also with moderate volume ascites with peritoneal nodularity concerning for peritoneal carcinomatosis    RECOMMENDATION    - trend CBC, CMP, INR  - continue pantoprazole 40mg IV BID  - continue Ceftriaxone 1gm IV Qday   - pending biopsy results from EGD 3/16/2019  - pending CEA, CA 19-9, AFP,   - recommend oncology consult and palliative care consult   - supportive care as per primary team      Elmer Meraz, PGY-5  GI fellow  B- [id number]/ [phone]  Please call GI fellow on call after 5pm and on weekends

## 2019-03-18 NOTE — DIETITIAN INITIAL EVALUATION ADULT. - PHYSICAL APPEARANCE
Unable to perform Nutrition Focused Physical Assessment in current setting; RD will reassess as appropriate.

## 2019-03-18 NOTE — DIETITIAN INITIAL EVALUATION ADULT. - ENERGY NEEDS
Ht: 62"  Wt: 125  BMI: 22.8 kg/m2   IBW: 110 (+/-10%)     114% IBW  Edema: 1+ generalized    Skin: no pressure injuries documented

## 2019-03-18 NOTE — DIETITIAN INITIAL EVALUATION ADULT. - OTHER INFO
Pt seen for: MICU Length Of Stay   Adm dx: PE, hematemesis, S/P EGD 3/16/2019 with presence of a large ulcerated mass in the stomach, biopsy done.  GI issues: no vomiting, abd distended   Last BM: 3/17    Food allergies/Intolerances: NKFA   Vit/supplement PTA: none noted

## 2019-03-18 NOTE — PROGRESS NOTE ADULT - SUBJECTIVE AND OBJECTIVE BOX
Chief Complaint:  Patient is a 73y old  Female who presents with a chief complaint of hematemesis (18 Mar 2019 06:40)      Interval Events:   - IR placement of IVC filter 3/17    - had a CT head done yesterday    HPI: from consult note  73 year old female with COPD (former smoker), HTN, HLD, RA (on colchicine), peripheral neuropathy who presented to the emergency room with chief complain of decreased appetite, generalized weakness and confusion for 3 days.   She was found to have PEs and thus was started on a heparin drip. She subsequently developed hematemesis and was emergently intubated for airway protection. GI was consulted for further evaluation of the hematemesis. Family including her daughter were at bedside. Other than weakness recently, she had no abdominal pain or melena/hematochezia.  GI Doctor: [place] [facility]  Last colonoscopy: 12.19.2018  Last endoscopy: never         Allergies:  No Known Allergies      Hospital Medications:  azithromycin  IVPB 500 milliGRAM(s) IV Intermittent every 24 hours  azithromycin  IVPB      chlorhexidine 2% Cloths 1 Application(s) Topical daily  chlorhexidine 4% Liquid 1 Application(s) Topical <User Schedule>  dexmedetomidine Infusion 0.7 MICROgram(s)/kG/Hr IV Continuous <Continuous>  insulin lispro (HumaLOG) corrective regimen sliding scale   SubCutaneous every 6 hours  norepinephrine Infusion 0.05 MICROgram(s)/kG/Min IV Continuous <Continuous>  pantoprazole  Injectable 40 milliGRAM(s) IV Push two times a day  piperacillin/tazobactam IVPB. 3.375 Gram(s) IV Intermittent every 8 hours      PMHX/PSHX:  Raynauds disease  GERD (gastroesophageal reflux disease)  HLD (hyperlipidemia)  HTN (hypertension)  COPD (chronic obstructive pulmonary disease)  S/P left cataract extraction  History of tonsillectomy  History of rhinoplasty  History of hernia repair      Family history:  No pertinent family history in first degree relatives      ROS: Unable to assess as she is intubated and sedated     PHYSICAL EXAM:     GENERAL:  Appears stated age  HEENT:  NC/AT  CHEST:  intubated   HEART:  Regular rhythm, S1, S2+  ABDOMEN:  Soft, non-tender, non-distended  EXTREMITIES:  no edema  SKIN:  skin rash+, large black discoloration on the right arm   NEURO: sedated       Vital Signs:  Vital Signs Last 24 Hrs  T(C): 37.3 (18 Mar 2019 03:00), Max: 37.3 (18 Mar 2019 03:00)  T(F): 99.1 (18 Mar 2019 03:00), Max: 99.1 (18 Mar 2019 03:00)  HR: 71 (18 Mar 2019 07:00) (68 - 95)  BP: 124/57 (18 Mar 2019 07:00) (78/46 - 124/57)  BP(mean): 82 (18 Mar 2019 07:00) (57 - 82)  RR: 20 (18 Mar 2019 07:00) (14 - 21)  SpO2: 100% (18 Mar 2019 07:00) (100% - 100%)  Daily     Daily     LABS:                        10.8   18.9  )-----------( 98       ( 18 Mar 2019 06:26 )             31.1     03-18    129<L>  |  94<L>  |  50<H>  ----------------------------<  160<H>  4.7   |  18<L>  |  2.03<H>    Ca    7.9<L>      18 Mar 2019 06:26  Phos  3.5     03-18  Mg     2.0     03-18    TPro  5.6<L>  /  Alb  3.6  /  TBili  1.1  /  DBili  x   /  AST  532<H>  /  ALT  285<H>  /  AlkPhos  110  03-18    LIVER FUNCTIONS - ( 18 Mar 2019 06:26 )  Alb: 3.6 g/dL / Pro: 5.6 g/dL / ALK PHOS: 110 U/L / ALT: 285 U/L / AST: 532 U/L / GGT: x           PT/INR - ( 18 Mar 2019 06:26 )   PT: 14.5 sec;   INR: 1.26 ratio         PTT - ( 18 Mar 2019 06:26 )  PTT:26.2 sec  Urinalysis Basic - ( 16 Mar 2019 20:35 )    Color: Light Yellow / Appearance: Clear / SG: >1.050 / pH: x  Gluc: x / Ketone: Small  / Bili: Negative / Urobili: Negative   Blood: x / Protein: 30 mg/dL / Nitrite: Negative   Leuk Esterase: Negative / RBC: 6 /hpf / WBC 4 /HPF   Sq Epi: x / Non Sq Epi: 1 /hpf / Bacteria: Negative      Amylase Serum--      Lipase serum--       [id number]      Imaging:    < from: Upper Endoscopy (03.17.19 @ 00:08) >  Findings:       The examined esophagus was normal.       Clotted blood was found in the entire examined stomach. This was cleared with copious amounts        of water flushes.       A medium-sized, ulcerated, fungating mass measuring 3-4 cm with no active bleeding was found        on the lesser curvature of the stomach adjacent to the incisura. There is a clean based ulcer        adjacent to this mass. Biopsies were taken with a cold forceps from the edges of the ulcer        and the mass itself for histology.       The duodenal bulb and 2nd part of the duodenum were normal.                                                                                                        Impression:          - Normal esophagus.                 - Clotted blood in the entire stomach.                       - Ulcerated gastric mass, with no active bleeding identified on the lesser                        curvature of the stomach next to the incisura. Biopsied. This is the likely                        source of bleeding, in the setting of anticoagulation.                       - Normal duodenal bulb and 2nd part of the duodenum.                       - Visualisation was limited due to presence of blood and clots in the stomach.  Recommendation:      - Observe patient in MICU for ongoing care.                       - NPO.                       - Use a proton pump inhibitor IV BID.                       - Await pathology results.    < end of copied text >      < from: CT Angio Chest w/ IV Cont (03.16.19 @ 15:53) >    FINDINGS:    CHEST:     LUNGS AND LARGE AIRWAYS: Patent central airways.  6 mm left lower lobe and 5 mm right upper lobe calcified granulomas, decreased in size from 11/5/2012, favoring benign etiology.  PLEURA: No pleural effusion.  VESSELS: There is acute pulmonary emboli involvingthe bilateral main   pulmonary arteries extending into the lobar, segmental and subsegmental pulmonary arteries of the right upper lobe, right lower lobe, right middle lobe, left lower lobe and left upper lobe. Atherosclerotic ulcerations of the thoracic aorta and coronary arteries.  HEART: Heart size is normal. No pericardial effusion. There is mild straightening of the intraventricular septum with reflux of contrast into   the IVC concerning for right heart strain.  MEDIASTINUM AND SHANELL: No lymphadenopathy.  CHEST WALL AND LOWER NECK: Within normal limits.  VISUALIZED UPPER ABDOMEN: Cirrhotic morphology with innumerable hypodense hepatic nodules. Cholelithiasis. Moderate volume ascites with peritoneal nodularity concerning for peritoneal carcinomatosis. Subcentimeter   hypodense renal lesions, too small to characterize. Anasarca along the imaged abdominal wall.  BONES: Multilevel spinal degenerative changes.    IMPRESSION:   Extensive bilateral pulmonary emboli involving the lobar, segmental and subsegmental pulmonary arteries.    Straightening of the intraventricular septum and reflux of contrast into the IVC concerning for right heart strain. Correlate with echocardiography.    Cirrhotic liver with innumerable hypodense nodules concerning for metastatic disease. Moderate volume ascites with peritoneal nodularity concerning for peritoneal carcinomatosis. Subcentimeter hypodense renal lesions, too small to characterize. Follow-up pending CT abdomen pelvis with intravenous contrast.    < end of copied text >      < from: Colonoscopy (11.04.15 @ 11:34) >  Impression:          - External and internal hemorrhoids.      - One 6 mm polyp at the hepatic flexure. Resected and retrieved.                       - Two 3 mm polyps in the cecum. Resected and retrieved.                       - Four 3 to 5 mm polyps in the ascending colon. Resected and retrieved.              - Diverticulosis in the sigmoid colon.                       - The examined portion of the ileum was normal.  Recommendation:      - Return to GI clinic in 1 month to follow up pathology results.                       - Repeat colonoscopyin 3 years for surveillance.    < end of copied text >    Surgical Pathology Report (12.19.18 @ 15:09)  Final Diagnosis    1. Cecum, polyp, biopsy  - Colonic mucosa with focal hyperplastic change. See noteNote: Deeper levels are also reviewed.  2. Transverse colon, polyp, biopsy  - Fragments of tubular adenoma  3. Rectum, polyp, biopsy  - Hyperplastic polyp    Verified by: Gabriela Stewart M.D.  (Electronic Signature)  Reported on: 12/21/18 15:30 [facility], [address] [place] [address]  [id number]  _________________________________________________________________    Clinical History  colon polyps    Specimen(s) Submitted  1     Cecal polyp  2     Transverse colon polyp  3     Rectal polyp Chief Complaint:  Patient is a 73y old  Female who presents with a chief complaint of hematemesis (18 Mar 2019 06:40)      Interval Events:   - IR placement of IVC filter 3/17    - had a CT head done yesterday  - sedated and laying comfortably in the bed     HPI: from consult note  73 year old female with COPD (former smoker), HTN, HLD, RA (on colchicine), peripheral neuropathy who presented to the emergency room with chief complain of decreased appetite, generalized weakness and confusion for 3 days.   She was found to have PEs and thus was started on a heparin drip. She subsequently developed hematemesis and was emergently intubated for airway protection. GI was consulted for further evaluation of the hematemesis. Family including her daughter were at bedside. Other than weakness recently, she had no abdominal pain or melena/hematochezia.  GI Doctor: [place] [facility]  Last colonoscopy: 12.19.2018  Last endoscopy: never         Allergies:  No Known Allergies      Hospital Medications:  azithromycin  IVPB 500 milliGRAM(s) IV Intermittent every 24 hours  azithromycin  IVPB      chlorhexidine 2% Cloths 1 Application(s) Topical daily  chlorhexidine 4% Liquid 1 Application(s) Topical <User Schedule>  dexmedetomidine Infusion 0.7 MICROgram(s)/kG/Hr IV Continuous <Continuous>  insulin lispro (HumaLOG) corrective regimen sliding scale   SubCutaneous every 6 hours  norepinephrine Infusion 0.05 MICROgram(s)/kG/Min IV Continuous <Continuous>  pantoprazole  Injectable 40 milliGRAM(s) IV Push two times a day  piperacillin/tazobactam IVPB. 3.375 Gram(s) IV Intermittent every 8 hours      PMHX/PSHX:  Raynauds disease  GERD (gastroesophageal reflux disease)  HLD (hyperlipidemia)  HTN (hypertension)  COPD (chronic obstructive pulmonary disease)  S/P left cataract extraction  History of tonsillectomy  History of rhinoplasty  History of hernia repair      Family history:  No pertinent family history in first degree relatives    ROS: Unable to assess as she is intubated and sedated     PHYSICAL EXAM:     GENERAL:  Appears stated age  HEENT:  NC/AT  CHEST:  intubated   HEART:  Regular rhythm, S1, S2+  ABDOMEN:  Soft, non-tender, non-distended  EXTREMITIES:  no edema  SKIN:  skin rash+, large black discoloration on the right arm   NEURO: sedated       Vital Signs:  Vital Signs Last 24 Hrs  T(C): 37.3 (18 Mar 2019 03:00), Max: 37.3 (18 Mar 2019 03:00)  T(F): 99.1 (18 Mar 2019 03:00), Max: 99.1 (18 Mar 2019 03:00)  HR: 71 (18 Mar 2019 07:00) (68 - 95)  BP: 124/57 (18 Mar 2019 07:00) (78/46 - 124/57)  BP(mean): 82 (18 Mar 2019 07:00) (57 - 82)  RR: 20 (18 Mar 2019 07:00) (14 - 21)  SpO2: 100% (18 Mar 2019 07:00) (100% - 100%)  Daily     Daily     LABS:                        10.8   18.9  )-----------( 98       ( 18 Mar 2019 06:26 )             31.1     03-18    129<L>  |  94<L>  |  50<H>  ----------------------------<  160<H>  4.7   |  18<L>  |  2.03<H>    Ca    7.9<L>      18 Mar 2019 06:26  Phos  3.5     03-18  Mg     2.0     03-18    TPro  5.6<L>  /  Alb  3.6  /  TBili  1.1  /  DBili  x   /  AST  532<H>  /  ALT  285<H>  /  AlkPhos  110  03-18    LIVER FUNCTIONS - ( 18 Mar 2019 06:26 )  Alb: 3.6 g/dL / Pro: 5.6 g/dL / ALK PHOS: 110 U/L / ALT: 285 U/L / AST: 532 U/L / GGT: x           PT/INR - ( 18 Mar 2019 06:26 )   PT: 14.5 sec;   INR: 1.26 ratio         PTT - ( 18 Mar 2019 06:26 )  PTT:26.2 sec  Urinalysis Basic - ( 16 Mar 2019 20:35 )    Color: Light Yellow / Appearance: Clear / SG: >1.050 / pH: x  Gluc: x / Ketone: Small  / Bili: Negative / Urobili: Negative   Blood: x / Protein: 30 mg/dL / Nitrite: Negative   Leuk Esterase: Negative / RBC: 6 /hpf / WBC 4 /HPF   Sq Epi: x / Non Sq Epi: 1 /hpf / Bacteria: Negative      Amylase Serum--      Lipase serum--       [id number]      Imaging:    < from: Upper Endoscopy (03.17.19 @ 00:08) >  Findings:       The examined esophagus was normal.       Clotted blood was found in the entire examined stomach. This was cleared with copious amounts        of water flushes.       A medium-sized, ulcerated, fungating mass measuring 3-4 cm with no active bleeding was found        on the lesser curvature of the stomach adjacent to the incisura. There is a clean based ulcer        adjacent to this mass. Biopsies were taken with a cold forceps from the edges of the ulcer        and the mass itself for histology.       The duodenal bulb and 2nd part of the duodenum were normal.                                                                                                        Impression:          - Normal esophagus.                 - Clotted blood in the entire stomach.                       - Ulcerated gastric mass, with no active bleeding identified on the lesser                        curvature of the stomach next to the incisura. Biopsied. This is the likely                        source of bleeding, in the setting of anticoagulation.                       - Normal duodenal bulb and 2nd part of the duodenum.                       - Visualisation was limited due to presence of blood and clots in the stomach.  Recommendation:      - Observe patient in MICU for ongoing care.                       - NPO.                       - Use a proton pump inhibitor IV BID.                       - Await pathology results.    < end of copied text >      < from: CT Angio Chest w/ IV Cont (03.16.19 @ 15:53) >    FINDINGS:    CHEST:     LUNGS AND LARGE AIRWAYS: Patent central airways.  6 mm left lower lobe and 5 mm right upper lobe calcified granulomas, decreased in size from 11/5/2012, favoring benign etiology.  PLEURA: No pleural effusion.  VESSELS: There is acute pulmonary emboli involvingthe bilateral main   pulmonary arteries extending into the lobar, segmental and subsegmental pulmonary arteries of the right upper lobe, right lower lobe, right middle lobe, left lower lobe and left upper lobe. Atherosclerotic ulcerations of the thoracic aorta and coronary arteries.  HEART: Heart size is normal. No pericardial effusion. There is mild straightening of the intraventricular septum with reflux of contrast into   the IVC concerning for right heart strain.  MEDIASTINUM AND SHANELL: No lymphadenopathy.  CHEST WALL AND LOWER NECK: Within normal limits.  VISUALIZED UPPER ABDOMEN: Cirrhotic morphology with innumerable hypodense hepatic nodules. Cholelithiasis. Moderate volume ascites with peritoneal nodularity concerning for peritoneal carcinomatosis. Subcentimeter   hypodense renal lesions, too small to characterize. Anasarca along the imaged abdominal wall.  BONES: Multilevel spinal degenerative changes.    IMPRESSION:   Extensive bilateral pulmonary emboli involving the lobar, segmental and subsegmental pulmonary arteries.    Straightening of the intraventricular septum and reflux of contrast into the IVC concerning for right heart strain. Correlate with echocardiography.    Cirrhotic liver with innumerable hypodense nodules concerning for metastatic disease. Moderate volume ascites with peritoneal nodularity concerning for peritoneal carcinomatosis. Subcentimeter hypodense renal lesions, too small to characterize. Follow-up pending CT abdomen pelvis with intravenous contrast.    < end of copied text >      < from: Colonoscopy (11.04.15 @ 11:34) >  Impression:          - External and internal hemorrhoids.      - One 6 mm polyp at the hepatic flexure. Resected and retrieved.                       - Two 3 mm polyps in the cecum. Resected and retrieved.                       - Four 3 to 5 mm polyps in the ascending colon. Resected and retrieved.              - Diverticulosis in the sigmoid colon.                       - The examined portion of the ileum was normal.  Recommendation:      - Return to GI clinic in 1 month to follow up pathology results.                       - Repeat colonoscopyin 3 years for surveillance.    < end of copied text >    Surgical Pathology Report (12.19.18 @ 15:09)  Final Diagnosis    1. Cecum, polyp, biopsy  - Colonic mucosa with focal hyperplastic change. See noteNote: Deeper levels are also reviewed.  2. Transverse colon, polyp, biopsy  - Fragments of tubular adenoma  3. Rectum, polyp, biopsy  - Hyperplastic polyp    Verified by: Gabriela Stewart M.D.  (Electronic Signature)  Reported on: 12/21/18 15:30 [facility], [address] [place] [address], [place] [address]  _________________________________________________________________    Clinical History  colon polyps    Specimen(s) Submitted  1     Cecal polyp  2     Transverse colon polyp  3     Rectal polyp

## 2019-03-18 NOTE — AIRWAY REMOVAL NOTE  ADULT & PEDS - ARTIFICAL AIRWAY REMOVAL COMMENTS
Written order for extubation verified.  Patient identified by full name and birth date as per identification band.  Present at the procedure was Costa Garcia RN

## 2019-03-18 NOTE — PROGRESS NOTE ADULT - SUBJECTIVE AND OBJECTIVE BOX
Interventional Radiology    S/P IVC filter placement, POD # 1.     Vital Signs Last 24 Hrs  T(C): 36.7 (18 Mar 2019 08:00), Max: 37.3 (18 Mar 2019 03:00)  T(F): 98 (18 Mar 2019 08:00), Max: 99.1 (18 Mar 2019 03:00)  HR: 68 (18 Mar 2019 10:15) (67 - 95)  BP: 104/51 (18 Mar 2019 10:15) (78/46 - 124/57)  BP(mean): 74 (18 Mar 2019 10:15) (57 - 82)  RR: 23 (18 Mar 2019 10:15) (14 - 23)  SpO2: 100% (18 Mar 2019 10:15) (79% - 100%)    General Appearance:     Procedure Sites: Right groin-   Right neck-     LABS:                       10.8   18.9  )-----------( 98       ( 18 Mar 2019 06:26 )             31.1     03-18    129<L>  |  94<L>  |  50<H>  ----------------------------<  160<H>  4.7   |  18<L>  |  2.03<H>    Ca    7.9<L>      18 Mar 2019 06:26  Phos  3.5     03-18  Mg     2.0     03-18    TPro  5.6<L>  /  Alb  3.6  /  TBili  1.1  /  DBili  x   /  AST  532<H>  /  ALT  285<H>  /  AlkPhos  110  03-18    PT/INR - ( 18 Mar 2019 06:26 )   PT: 14.5 sec;   INR: 1.26 ratio    PTT - ( 18 Mar 2019 06:26 )  PTT:26.2 sec    A/P   73y Female with bleeding gastric mass, hepatic mets and evidence of peritoneal carcinomatosis with bilateral DVT and b/l PE and contraindicated for AC secondary to GI bleed now s/p IVC filter placement on 3/17/19     Continue care as per MICU team/primary team    KEILA Juan  Spectralink [id number]  Ext [phone] Interventional Radiology    S/P IVC filter placement, POD # 1.     Vital Signs Last 24 Hrs  T(C): 36.7 (18 Mar 2019 08:00), Max: 37.3 (18 Mar 2019 03:00)  T(F): 98 (18 Mar 2019 08:00), Max: 99.1 (18 Mar 2019 03:00)  HR: 68 (18 Mar 2019 10:15) (67 - 95)  BP: 104/51 (18 Mar 2019 10:15) (78/46 - 124/57)  BP(mean): 74 (18 Mar 2019 10:15) (57 - 82)  RR: 23 (18 Mar 2019 10:15) (14 - 23)  SpO2: 100% (18 Mar 2019 10:15) (79% - 100%)    General Appearance: intubated on ventillator, sedated     Procedure Sites: Right groin- no bleeding or hematoma, dressing c/d/i  Right neck- no bleeding or hematoma, dressing c/d/i     LABS:                       10.8   18.9  )-----------( 98       ( 18 Mar 2019 06:26 )             31.1     03-18    129<L>  |  94<L>  |  50<H>  ----------------------------<  160<H>  4.7   |  18<L>  |  2.03<H>    Ca    7.9<L>      18 Mar 2019 06:26  Phos  3.5     03-18  Mg     2.0     03-18    TPro  5.6<L>  /  Alb  3.6  /  TBili  1.1  /  DBili  x   /  AST  532<H>  /  ALT  285<H>  /  AlkPhos  110  03-18    PT/INR - ( 18 Mar 2019 06:26 )   PT: 14.5 sec;   INR: 1.26 ratio    PTT - ( 18 Mar 2019 06:26 )  PTT:26.2 sec    A/P   73y Female with bleeding gastric mass, hepatic mets and evidence of peritoneal carcinomatosis with bilateral DVT and b/l PE and contraindicated for AC secondary to GI bleed now s/p IVC filter placement on 3/17/19     Continue care as per MICU team/primary team    Med Wagner, TREVOR-C  [facility] [id number]  Ext [phone]

## 2019-03-18 NOTE — DIETITIAN INITIAL EVALUATION ADULT. - NS AS NUTRI INTERV ENTERAL NUTRITION
If EN initiated, recommend Vital 1.2 at 60 cc/hr x 18 hrs provides 1296 kcals, 81 gm protein, 876cc free water  meets 23 Kcal/Kg, 1.4Gm/kg dosing wt 56.6kg

## 2019-03-18 NOTE — PROGRESS NOTE ADULT - SUBJECTIVE AND OBJECTIVE BOX
CHIEF COMPLAINT:    Interval Events: IVC filter placed 3/17, anterior to large IVC thrombus. At the time of ivc filter placement, there was evidence of collaterals.       REVIEW OF SYSTEMS:  CONSTITUTIONAL: No weakness, fevers or chills  EYES/ENT: No visual changes;  No vertigo or throat pain   NECK: No pain or stiffness  RESPIRATORY: No cough, wheezing, hemoptysis; No shortness of breath  CARDIOVASCULAR: No chest pain or palpitations  GASTROINTESTINAL: No abdominal or epigastric pain. No nausea, vomiting, or hematemesis; No diarrhea or constipation. No melena or hematochezia.  GENITOURINARY: No dysuria, frequency or hematuria  NEUROLOGICAL: No numbness or weakness  SKIN: No itching, burning, rashes, or lesions   All other review of systems is negative unless indicated above.    OBJECTIVE:  ICU Vital Signs Last 24 Hrs  T(C): 37.3 (18 Mar 2019 03:00), Max: 37.3 (18 Mar 2019 03:00)  T(F): 99.1 (18 Mar 2019 03:00), Max: 99.1 (18 Mar 2019 03:00)  HR: 80 (18 Mar 2019 04:47) (73 - 95)  BP: 78/50 (18 Mar 2019 04:45) (78/46 - 109/59)  BP(mean): 59 (18 Mar 2019 04:45) (58 - 76)  ABP: --  ABP(mean): --  RR: 14 (18 Mar 2019 04:45) (14 - 21)  SpO2: 100% (18 Mar 2019 04:47) (100% - 100%)    Mode: AC/ CMV (Assist Control/ Continuous Mandatory Ventilation), RR (machine): 15, TV (machine): 450, FiO2: 60, PEEP: 5, ITime: 0.9, MAP: 14, PIP: 39    03-16 @ 07:01  -  03-17 @ 07:00  --------------------------------------------------------  IN: 783 mL / OUT: 335 mL / NET: 448 mL    03-17 @ 07:01  -  03-18 @ 06:40  --------------------------------------------------------  IN: 1075.6 mL / OUT: 260 mL / NET: 815.6 mL      CAPILLARY BLOOD GLUCOSE      POCT Blood Glucose.: 209 mg/dL (18 Mar 2019 00:42)      PHYSICAL EXAM:  General:   HEENT:   Lymph Nodes:  Neck:   Respiratory:   Cardiovascular:   Abdomen:   Extremities:   Skin:   Neurological:  Psychiatry:    LINES:    HOSPITAL MEDICATIONS:  Standing Meds:  azithromycin  IVPB 500 milliGRAM(s) IV Intermittent every 24 hours  azithromycin  IVPB      chlorhexidine 2% Cloths 1 Application(s) Topical daily  chlorhexidine 4% Liquid 1 Application(s) Topical <User Schedule>  dexmedetomidine Infusion 0.7 MICROgram(s)/kG/Hr IV Continuous <Continuous>  insulin lispro (HumaLOG) corrective regimen sliding scale   SubCutaneous every 6 hours  norepinephrine Infusion 0.05 MICROgram(s)/kG/Min IV Continuous <Continuous>  pantoprazole  Injectable 40 milliGRAM(s) IV Push two times a day  piperacillin/tazobactam IVPB. 3.375 Gram(s) IV Intermittent every 8 hours      PRN Meds:      LABS:                        10.3   15.4  )-----------( 77       ( 18 Mar 2019 00:15 )             28.9     Hgb Trend: 10.3<--, 10.5<--, 11.9<--, 13.6<--, 13.3<--  03-18    126<L>  |  92<L>  |  49<H>  ----------------------------<  232<H>  4.4   |  18<L>  |  1.76<H>    Ca    7.8<L>      18 Mar 2019 00:15  Phos  3.3     03-18  Mg     2.0     03-18    TPro  5.2<L>  /  Alb  3.6  /  TBili  1.1  /  DBili  x   /  AST  779<H>  /  ALT  297<H>  /  AlkPhos  102  03-18    Creatinine Trend: 1.76<--, 1.63<--, 1.39<--, 1.27<--, 1.12<--, 1.12<--  PT/INR - ( 18 Mar 2019 00:15 )   PT: 15.4 sec;   INR: 1.34 ratio         PTT - ( 18 Mar 2019 00:15 )  PTT:25.8 sec  Urinalysis Basic - ( 16 Mar 2019 20:35 )    Color: Light Yellow / Appearance: Clear / SG: >1.050 / pH: x  Gluc: x / Ketone: Small  / Bili: Negative / Urobili: Negative   Blood: x / Protein: 30 mg/dL / Nitrite: Negative   Leuk Esterase: Negative / RBC: 6 /hpf / WBC 4 /HPF   Sq Epi: x / Non Sq Epi: 1 /hpf / Bacteria: Negative      Arterial Blood Gas:  03-17 @ 23:56  7.39/35/142/20/99/-3.6  ABG lactate: --  Arterial Blood Gas:  03-16 @ 21:55  7.22/46/73/18/91/-9.3  ABG lactate: --    Venous Blood Gas:  03-16 @ 20:23  7.26/47/66/21/89  VBG Lactate: 1.9  Venous Blood Gas:  03-16 @ 18:44  7.37/41/23/23/30  VBG Lactate: 2.4  Venous Blood Gas:  03-16 @ 14:14  7.28/44/25/20/26  VBG Lactate: 6.8      MICROBIOLOGY:     Culture - Urine (collected 17 Mar 2019 01:30)  Source: .Urine Clean Catch (Midstream)  Final Report (17 Mar 2019 22:15):    No growth    Culture - Blood (collected 16 Mar 2019 17:27)  Source: .Blood Blood-Peripheral  Preliminary Report (17 Mar 2019 18:01):    No growth to date.    Culture - Blood (collected 16 Mar 2019 17:27)  Source: .Blood Blood-Peripheral  Preliminary Report (17 Mar 2019 18:01):    No growth to date.      RADIOLOGY:  [ ] Reviewed and interpreted by me    EKG: CHIEF COMPLAINT:    Interval Events: IVC filter placed 3/17, anterior to large IVC thrombus. At the time of ivc filter placement, there was evidence of collaterals.  Today will wean , as GI has does not want to scope .       REVIEW OF SYSTEMS: unable to assess 2/2 AMS .     OBJECTIVE:  ICU Vital Signs Last 24 Hrs  T(C): 37.3 (18 Mar 2019 03:00), Max: 37.3 (18 Mar 2019 03:00)  T(F): 99.1 (18 Mar 2019 03:00), Max: 99.1 (18 Mar 2019 03:00)  HR: 80 (18 Mar 2019 04:47) (73 - 95)  BP: 78/50 (18 Mar 2019 04:45) (78/46 - 109/59)  BP(mean): 59 (18 Mar 2019 04:45) (58 - 76)  ABP: --  ABP(mean): --  RR: 14 (18 Mar 2019 04:45) (14 - 21)  SpO2: 100% (18 Mar 2019 04:47) (100% - 100%)    Mode: AC/ CMV (Assist Control/ Continuous Mandatory Ventilation), RR (machine): 15, TV (machine): 450, FiO2: 60, PEEP: 5, ITime: 0.9, MAP: 14, PIP: 39    03-16 @ 07:01  -  03-17 @ 07:00  --------------------------------------------------------  IN: 783 mL / OUT: 335 mL / NET: 448 mL    03-17 @ 07:01  -  03-18 @ 06:40  --------------------------------------------------------  IN: 1075.6 mL / OUT: 260 mL / NET: 815.6 mL      CAPILLARY BLOOD GLUCOSE      POCT Blood Glucose.: 209 mg/dL (18 Mar 2019 00:42)    PHYSICAL EXAM:  GENERAL: NAD, well-developed  HEAD:  Atraumatic, Normocephalic  EYES: EOMI, PERRLA, conjunctiva and sclera clear  NECK: Supple, No JVD  CHEST/LUNG: Clear to auscultation bilaterally; No wheeze  HEART: Regular rate and rhythm; No murmurs, rubs, or gallops  ABDOMEN: Soft, Nontender, Nondistended; Bowel sounds present  EXTREMITIES:, No clubbing, cyanosis, or edema  PSYCH: AAOx3  NEUROLOGY: non-focal  SKIN: No rashes or lesions    LINES:    HOSPITAL MEDICATIONS:  Standing Meds:  azithromycin  IVPB 500 milliGRAM(s) IV Intermittent every 24 hours  azithromycin  IVPB      chlorhexidine 2% Cloths 1 Application(s) Topical daily  chlorhexidine 4% Liquid 1 Application(s) Topical <User Schedule>  dexmedetomidine Infusion 0.7 MICROgram(s)/kG/Hr IV Continuous <Continuous>  insulin lispro (HumaLOG) corrective regimen sliding scale   SubCutaneous every 6 hours  norepinephrine Infusion 0.05 MICROgram(s)/kG/Min IV Continuous <Continuous>  pantoprazole  Injectable 40 milliGRAM(s) IV Push two times a day  piperacillin/tazobactam IVPB. 3.375 Gram(s) IV Intermittent every 8 hours      PRN Meds:      LABS:                        10.3   15.4  )-----------( 77       ( 18 Mar 2019 00:15 )             28.9     Hgb Trend: 10.3<--, 10.5<--, 11.9<--, 13.6<--, 13.3<--  03-18    126<L>  |  92<L>  |  49<H>  ----------------------------<  232<H>  4.4   |  18<L>  |  1.76<H>    Ca    7.8<L>      18 Mar 2019 00:15  Phos  3.3     03-18  Mg     2.0     03-18    TPro  5.2<L>  /  Alb  3.6  /  TBili  1.1  /  DBili  x   /  AST  779<H>  /  ALT  297<H>  /  AlkPhos  102  03-18    Creatinine Trend: 1.76<--, 1.63<--, 1.39<--, 1.27<--, 1.12<--, 1.12<--  PT/INR - ( 18 Mar 2019 00:15 )   PT: 15.4 sec;   INR: 1.34 ratio         PTT - ( 18 Mar 2019 00:15 )  PTT:25.8 sec  Urinalysis Basic - ( 16 Mar 2019 20:35 )    Color: Light Yellow / Appearance: Clear / SG: >1.050 / pH: x  Gluc: x / Ketone: Small  / Bili: Negative / Urobili: Negative   Blood: x / Protein: 30 mg/dL / Nitrite: Negative   Leuk Esterase: Negative / RBC: 6 /hpf / WBC 4 /HPF   Sq Epi: x / Non Sq Epi: 1 /hpf / Bacteria: Negative      Arterial Blood Gas:  03-17 @ 23:56  7.39/35/142/20/99/-3.6  ABG lactate: --  Arterial Blood Gas:  03-16 @ 21:55  7.22/46/73/18/91/-9.3  ABG lactate: --    Venous Blood Gas:  03-16 @ 20:23  7.26/47/66/21/89  VBG Lactate: 1.9  Venous Blood Gas:  03-16 @ 18:44  7.37/41/23/23/30  VBG Lactate: 2.4  Venous Blood Gas:  03-16 @ 14:14  7.28/44/25/20/26  VBG Lactate: 6.8      MICROBIOLOGY:     Culture - Urine (collected 17 Mar 2019 01:30)  Source: .Urine Clean Catch (Midstream)  Final Report (17 Mar 2019 22:15):    No growth    Culture - Blood (collected 16 Mar 2019 17:27)  Source: .Blood Blood-Peripheral  Preliminary Report (17 Mar 2019 18:01):    No growth to date.    Culture - Blood (collected 16 Mar 2019 17:27)  Source: .Blood Blood-Peripheral  Preliminary Report (17 Mar 2019 18:01):    No growth to date.      RADIOLOGY:  [ ] Reviewed and interpreted by me    EKG:

## 2019-03-18 NOTE — DIETITIAN INITIAL EVALUATION ADULT. - PERTINENT MEDS FT
MEDICATIONS  (STANDING):  cefTRIAXone   IVPB      chlorhexidine 4% Liquid 1 Application(s) Topical two times a day  dexmedetomidine Infusion 0.7 MICROgram(s)/kG/Hr (9.905 mL/Hr) IV Continuous <Continuous>  heparin  Injectable 5000 Unit(s) SubCutaneous every 8 hours  insulin lispro (HumaLOG) corrective regimen sliding scale   SubCutaneous every 6 hours  norepinephrine Infusion 0.05 MICROgram(s)/kG/Min (5.306 mL/Hr) IV Continuous <Continuous>  pantoprazole  Injectable 40 milliGRAM(s) IV Push two times a day    MEDICATIONS  (PRN):

## 2019-03-18 NOTE — PROGRESS NOTE ADULT - ASSESSMENT
73 F w/ pmh former smoker HTN, HLD, RA (colchicine), peripheral neuropathy who presents for decreased appetite, generalized weakness and confusion for 3 days. Patient is a clinic patient at 865 and follow up with Dr. Lema in [facility]. Patient had a colonoscopy in dec/2018. Patient also recently returned from [place] 2/18/19. Patient is denied any chest pain, abdominal pain, sob, fever.  Patient is AxO2, no complaints. She has a history of hepatitis A, her hepatitis B and C were negative in 2015.     #Neuro  -intubated/sedated for airway protection in the setting of GIB  - Ammonia level of 51   Hx of peripheral neuropathy    #Cards  -Troponins downtrended from 36 to 33, proBNP pending   -heparin drip for PE stopped in setting of acute bleed  -will check TTE and LE duplex; may require IVCF; previous echo from 2014 with EF of 35%  - levo at standby, 2/2 to drop in blood pressure to 60's systolic, now currently mapping 71 off of pressors     #resp  Intubated for airway protection in setting of UGI bleed, hematemesis  PE  - Pulmonary emboli likely from hypercoagulable state from malignancy.    - LE dopplers -  with evidence of b/l lower extremity DVT   - Will avoid blood thinning agents, such as heparin, thrombolysis etc.  - IR  placement of IVC filter 3/17    - Will avoid IV fluids given decreased preload and decreased cardiac output, if sbp drops, will consider levo for support  - Maintain Goal sat 90%     #GI  UGIB and hematemesis  -intubated for airway protection, stop AC  -NPO  - s/p EGD with findings of ulcerated mass and clotted blood in the stomach  - now on  ppi BID   - Biopsies pending s/p EGD   - Likely may need sample of ascites.       #Renal  -BUN/Cr sl elevated compared to 1 year prior  -monitor, may worsen in setting of acute blood loss anemia/low volume state  -avoid nephrotoxins   - renally dose meds       #ID  -recent travel to [place]  -hx of hepatitis A  -received vanc and zosyn in ED  -WBC elevated with 91% neutrophils, afebrile but tachycardic to 92 prior to bleeding  -blood cultures in ED pending  possible sources include ascites however abd exam ok, pna with LLL opacity on CXR  -can cont vanc, zosyn, plus azithro for CAP and f/u urine legionella and blood cultures      #VTE  - DVT's b/l of right and left popliteal veins likely in the setting of malignancy   #GOC  - Spoke with patient whom endorsed daughter as surrogate decision maker 73 F w/ pmh former smoker HTN, HLD, RA (colchicine), peripheral neuropathy who presents for decreased appetite, generalized weakness and confusion for 3 days. Patient is a clinic patient at 866 and follow up with Dr. Lema in [facility]. Patient had a colonoscopy in dec/2018. Patient also recently returned from [place] 2/18/19. Patient is denied any chest pain, abdominal pain, sob, fever.  Patient is AxO2, no complaints. She has a history of hepatitis A, her hepatitis B and C were negative in 2015.     #Neuro  -intubated/sedated for airway protection in the setting of GIB  - Ammonia level of 51   -CT Head No Cont-INTERPRETATION:  cortically-based hyperattenuation in the left frontal   region, corresponding to area of age-indeterminant infarct, is likely   related to recent intravenous contrast administration. otherwise no   emergent findings.  Hx of peripheral neuropathy    #Cards  -Troponins downtrended from 36 to 33, proBNP pending   -heparin drip for PE stopped in setting of acute bleed  -will check TTE and LE duplex; may require IVCF; previous echo from 2014 with EF of 35%  - levo at standby, 2/2 to drop in blood pressure to 60's systolic, now currently mapping 71 off of pressors     #resp  Intubated for airway protection in setting of UGI bleed, hematemesis  PE  - Pulmonary emboli likely from hypercoagulable state from malignancy.    - LE dopplers -  with evidence of b/l lower extremity DVT   - Will avoid blood thinning agents, such as heparin, thrombolysis etc.  - IR  placement of IVC filter 3/17    - Will avoid IV fluids given decreased preload and decreased cardiac output, if sbp drops, will consider levo for support  - Maintain Goal sat 90%     #GI  UGIB and hematemesis  -intubated for airway protection, stop AC  -NPO  - s/p EGD with findings of ulcerated mass and clotted blood in the stomach  - now on  ppi BID   - Biopsies pending s/p EGD   - Likely may need sample of ascites.       #Renal  -BUN/Cr sl elevated compared to 1 year prior  -monitor, may worsen in setting of acute blood loss anemia/low volume state  -avoid nephrotoxins   - renally dose meds       #ID  -recent travel to [place]  -hx of hepatitis A  -received vanc and zosyn in ED  -WBC elevated with 91% neutrophils, afebrile but tachycardic to 92 prior to bleeding  -blood cultures in ED pending  possible sources include ascites however abd exam ok, pna with LLL opacity on CXR  -can cont vanc, zosyn, plus azithro for CAP and f/u urine legionella and blood cultures      #VTE  - DVT's b/l of right and left popliteal veins likely in the setting of malignancy   #GOC  - Spoke with patient whom endorsed daughter as surrogate decision maker 73 F w/ pmh former smoker HTN, HLD, RA (colchicine), peripheral neuropathy who presents for decreased appetite, generalized weakness and confusion for 3 days. Patient is a clinic patient at 866 and follow up with Dr. Lema in [facility]. Patient had a colonoscopy in dec/2018. Patient also recently returned from [place] 2/18/19. Patient is denied any chest pain, abdominal pain, sob, fever.  Patient is AxO2, no complaints. She has a history of hepatitis A, her hepatitis B and C were negative in 2015.     #Neuro  -intubated/sedated for airway protection in the setting of GIB  -CT Head No Cont-INTERPRETATION:  cortically-based hyperattenuation in the left frontal region, corresponding to area of age-indeterminant infarct, is likely related to recent intravenous contrast administration. otherwise no   emergent findings.  -Hx of peripheral neuropathy    #Cards  - ProBNP - 6259  - Heparin drip for PE stopped in setting of acute bleed  - Will check TTE and LE duplex; may require IVCF; previous echo from 2014 with EF of 35%  - Levo at standby, 2/2 to drop in blood pressure to 60's systolic, now currently mapping 71 off of pressors     #resp  Intubated for airway protection in setting of UGI bleed, hematemesis  PE  - Pulmonary emboli likely from hypercoagulable state from malignancy.    - LE dopplers -  with evidence of b/l lower extremity DVT   - Will avoid blood thinning agents, such as heparin, thrombolysis etc.  - IR  placement of IVC filter 3/17    - Will avoid IV fluids given decreased preload and decreased cardiac output, if sbp drops, will consider levo for support  - Maintain Goal sat 90%     #GI  UGIB and hematemesis  - will extubate today as there is no indication at this time to re-scope, as per GI     -NPO  - s/p EGD with findings of ulcerated mass and clotted blood in the stomach  - Now on  ppi BID   - Biopsies pending s/p EGD   - Likely may need sample of ascites.       #Renal  -BUN/Cr sl elevated compared to 1 year prior  -monitor, may worsen in setting of acute blood loss anemia/low volume state  -avoid nephrotoxins   - renally dose meds       #ID  -recent travel to [place]  -hx of hepatitis A  -WBC elevated with 91% neutrophils, afebrile but tachycardic to 92 prior to bleeding  -blood cultures in ED pending, possible sources include ascites however abd exam ok, PNA with LLL opacity on CXR  -de-escalated abx to ceftriaxone ( prophylactic abx course ), NGTD in blood culture, urine culture, urine legionella negative     #VTE  - DVT's b/l of right and left popliteal veins likely in the setting of malignancy     #GOC  - Spoke with patient whom endorsed daughter as surrogate decision maker, GOC discussion

## 2019-03-19 ENCOUNTER — APPOINTMENT (OUTPATIENT)
Dept: GASTROENTEROLOGY | Facility: HOSPITAL | Age: 74
End: 2019-03-19

## 2019-03-19 LAB
ALBUMIN SERPL ELPH-MCNC: 3.4 G/DL — SIGNIFICANT CHANGE UP (ref 3.3–5)
ALBUMIN SERPL ELPH-MCNC: 3.6 G/DL — SIGNIFICANT CHANGE UP (ref 3.3–5)
ALP SERPL-CCNC: 102 U/L — SIGNIFICANT CHANGE UP (ref 40–120)
ALP SERPL-CCNC: 104 U/L — SIGNIFICANT CHANGE UP (ref 40–120)
ALT FLD-CCNC: 172 U/L — HIGH (ref 10–45)
ALT FLD-CCNC: 199 U/L — HIGH (ref 10–45)
ANION GAP SERPL CALC-SCNC: 17 MMOL/L — SIGNIFICANT CHANGE UP (ref 5–17)
ANION GAP SERPL CALC-SCNC: 17 MMOL/L — SIGNIFICANT CHANGE UP (ref 5–17)
APTT BLD: 24.8 SEC — LOW (ref 27.5–36.3)
APTT BLD: 30 SEC — SIGNIFICANT CHANGE UP (ref 27.5–36.3)
AST SERPL-CCNC: 149 U/L — HIGH (ref 10–40)
AST SERPL-CCNC: 222 U/L — HIGH (ref 10–40)
BASOPHILS # BLD AUTO: 0 K/UL — SIGNIFICANT CHANGE UP (ref 0–0.2)
BASOPHILS NFR BLD AUTO: 0.1 % — SIGNIFICANT CHANGE UP (ref 0–2)
BILIRUB SERPL-MCNC: 0.7 MG/DL — SIGNIFICANT CHANGE UP (ref 0.2–1.2)
BILIRUB SERPL-MCNC: 0.9 MG/DL — SIGNIFICANT CHANGE UP (ref 0.2–1.2)
BUN SERPL-MCNC: 56 MG/DL — HIGH (ref 7–23)
BUN SERPL-MCNC: 58 MG/DL — HIGH (ref 7–23)
CALCIUM SERPL-MCNC: 8.1 MG/DL — LOW (ref 8.4–10.5)
CALCIUM SERPL-MCNC: 8.2 MG/DL — LOW (ref 8.4–10.5)
CHLORIDE SERPL-SCNC: 95 MMOL/L — LOW (ref 96–108)
CHLORIDE SERPL-SCNC: 95 MMOL/L — LOW (ref 96–108)
CHOLEST SERPL-MCNC: 57 MG/DL — SIGNIFICANT CHANGE UP (ref 10–199)
CO2 SERPL-SCNC: 18 MMOL/L — LOW (ref 22–31)
CO2 SERPL-SCNC: 18 MMOL/L — LOW (ref 22–31)
CREAT SERPL-MCNC: 2.12 MG/DL — HIGH (ref 0.5–1.3)
CREAT SERPL-MCNC: 2.21 MG/DL — HIGH (ref 0.5–1.3)
EOSINOPHIL # BLD AUTO: 0.1 K/UL — SIGNIFICANT CHANGE UP (ref 0–0.5)
EOSINOPHIL NFR BLD AUTO: 0.7 % — SIGNIFICANT CHANGE UP (ref 0–6)
GLUCOSE BLDC GLUCOMTR-MCNC: 141 MG/DL — HIGH (ref 70–99)
GLUCOSE BLDC GLUCOMTR-MCNC: 163 MG/DL — HIGH (ref 70–99)
GLUCOSE BLDC GLUCOMTR-MCNC: 185 MG/DL — HIGH (ref 70–99)
GLUCOSE BLDC GLUCOMTR-MCNC: 186 MG/DL — HIGH (ref 70–99)
GLUCOSE SERPL-MCNC: 151 MG/DL — HIGH (ref 70–99)
GLUCOSE SERPL-MCNC: 184 MG/DL — HIGH (ref 70–99)
HBA1C BLD-MCNC: 5.6 % — SIGNIFICANT CHANGE UP (ref 4–5.6)
HCT VFR BLD CALC: 28.9 % — LOW (ref 34.5–45)
HDLC SERPL-MCNC: 19 MG/DL — LOW
HGB BLD-MCNC: 9.8 G/DL — LOW (ref 11.5–15.5)
INR BLD: 1.23 RATIO — HIGH (ref 0.88–1.16)
INR BLD: 1.27 RATIO — HIGH (ref 0.88–1.16)
LIPID PNL WITH DIRECT LDL SERPL: 21 MG/DL — SIGNIFICANT CHANGE UP
LYMPHOCYTES # BLD AUTO: 0.7 K/UL — LOW (ref 1–3.3)
LYMPHOCYTES # BLD AUTO: 4.4 % — LOW (ref 13–44)
MAGNESIUM SERPL-MCNC: 2 MG/DL — SIGNIFICANT CHANGE UP (ref 1.6–2.6)
MAGNESIUM SERPL-MCNC: 2.1 MG/DL — SIGNIFICANT CHANGE UP (ref 1.6–2.6)
MCHC RBC-ENTMCNC: 30.7 PG — SIGNIFICANT CHANGE UP (ref 27–34)
MCHC RBC-ENTMCNC: 33.8 GM/DL — SIGNIFICANT CHANGE UP (ref 32–36)
MCV RBC AUTO: 90.8 FL — SIGNIFICANT CHANGE UP (ref 80–100)
MONOCYTES # BLD AUTO: 1.2 K/UL — HIGH (ref 0–0.9)
MONOCYTES NFR BLD AUTO: 6.9 % — SIGNIFICANT CHANGE UP (ref 2–14)
NEUTROPHILS # BLD AUTO: 14.8 K/UL — HIGH (ref 1.8–7.4)
NEUTROPHILS NFR BLD AUTO: 87.8 % — HIGH (ref 43–77)
NT-PROBNP SERPL-SCNC: 2154 PG/ML — HIGH (ref 0–300)
PHOSPHATE SERPL-MCNC: 3.9 MG/DL — SIGNIFICANT CHANGE UP (ref 2.5–4.5)
PHOSPHATE SERPL-MCNC: 4 MG/DL — SIGNIFICANT CHANGE UP (ref 2.5–4.5)
PLATELET # BLD AUTO: 96 K/UL — LOW (ref 150–400)
POTASSIUM SERPL-MCNC: 4 MMOL/L — SIGNIFICANT CHANGE UP (ref 3.5–5.3)
POTASSIUM SERPL-MCNC: 4.2 MMOL/L — SIGNIFICANT CHANGE UP (ref 3.5–5.3)
POTASSIUM SERPL-SCNC: 4 MMOL/L — SIGNIFICANT CHANGE UP (ref 3.5–5.3)
POTASSIUM SERPL-SCNC: 4.2 MMOL/L — SIGNIFICANT CHANGE UP (ref 3.5–5.3)
PROT SERPL-MCNC: 5.3 G/DL — LOW (ref 6–8.3)
PROT SERPL-MCNC: 5.5 G/DL — LOW (ref 6–8.3)
PROTHROM AB SERPL-ACNC: 14.1 SEC — HIGH (ref 10–12.9)
PROTHROM AB SERPL-ACNC: 14.7 SEC — HIGH (ref 10–12.9)
RBC # BLD: 3.18 M/UL — LOW (ref 3.8–5.2)
RBC # FLD: 14.1 % — SIGNIFICANT CHANGE UP (ref 10.3–14.5)
SODIUM SERPL-SCNC: 130 MMOL/L — LOW (ref 135–145)
SODIUM SERPL-SCNC: 130 MMOL/L — LOW (ref 135–145)
TOTAL CHOLESTEROL/HDL RATIO MEASUREMENT: 3 RATIO — LOW (ref 3.3–7.1)
TRIGL SERPL-MCNC: 86 MG/DL — SIGNIFICANT CHANGE UP (ref 10–149)
VANCOMYCIN FLD-MCNC: 15.2 UG/ML — SIGNIFICANT CHANGE UP
WBC # BLD: 16.9 K/UL — HIGH (ref 3.8–10.5)
WBC # FLD AUTO: 16.9 K/UL — HIGH (ref 3.8–10.5)

## 2019-03-19 PROCEDURE — 70450 CT HEAD/BRAIN W/O DYE: CPT | Mod: 26

## 2019-03-19 PROCEDURE — 99291 CRITICAL CARE FIRST HOUR: CPT

## 2019-03-19 RX ORDER — SODIUM CHLORIDE 9 MG/ML
1000 INJECTION, SOLUTION INTRAVENOUS
Qty: 0 | Refills: 0 | Status: DISCONTINUED | OUTPATIENT
Start: 2019-03-19 | End: 2019-03-20

## 2019-03-19 RX ORDER — ALBUMIN HUMAN 25 %
100 VIAL (ML) INTRAVENOUS EVERY 6 HOURS
Qty: 0 | Refills: 0 | Status: DISCONTINUED | OUTPATIENT
Start: 2019-03-19 | End: 2019-03-20

## 2019-03-19 RX ORDER — SODIUM CHLORIDE 9 MG/ML
500 INJECTION, SOLUTION INTRAVENOUS ONCE
Qty: 0 | Refills: 0 | Status: COMPLETED | OUTPATIENT
Start: 2019-03-19 | End: 2019-03-19

## 2019-03-19 RX ORDER — VANCOMYCIN HCL 1 G
1250 VIAL (EA) INTRAVENOUS ONCE
Qty: 0 | Refills: 0 | Status: COMPLETED | OUTPATIENT
Start: 2019-03-19 | End: 2019-03-19

## 2019-03-19 RX ORDER — HEPARIN SODIUM 5000 [USP'U]/ML
INJECTION INTRAVENOUS; SUBCUTANEOUS
Qty: 25000 | Refills: 0 | Status: DISCONTINUED | OUTPATIENT
Start: 2019-03-19 | End: 2019-03-19

## 2019-03-19 RX ADMIN — Medication 50 MILLILITER(S): at 22:05

## 2019-03-19 RX ADMIN — HEPARIN SODIUM 5000 UNIT(S): 5000 INJECTION INTRAVENOUS; SUBCUTANEOUS at 05:56

## 2019-03-19 RX ADMIN — CEFTRIAXONE 100 GRAM(S): 500 INJECTION, POWDER, FOR SOLUTION INTRAMUSCULAR; INTRAVENOUS at 10:22

## 2019-03-19 RX ADMIN — CHLORHEXIDINE GLUCONATE 1 APPLICATION(S): 213 SOLUTION TOPICAL at 18:37

## 2019-03-19 RX ADMIN — CHLORHEXIDINE GLUCONATE 1 APPLICATION(S): 213 SOLUTION TOPICAL at 05:56

## 2019-03-19 RX ADMIN — PANTOPRAZOLE SODIUM 40 MILLIGRAM(S): 20 TABLET, DELAYED RELEASE ORAL at 18:37

## 2019-03-19 RX ADMIN — SODIUM CHLORIDE 100 MILLILITER(S): 9 INJECTION, SOLUTION INTRAVENOUS at 10:22

## 2019-03-19 RX ADMIN — SODIUM CHLORIDE 100 MILLILITER(S): 9 INJECTION, SOLUTION INTRAVENOUS at 22:06

## 2019-03-19 RX ADMIN — PANTOPRAZOLE SODIUM 40 MILLIGRAM(S): 20 TABLET, DELAYED RELEASE ORAL at 05:56

## 2019-03-19 RX ADMIN — HEPARIN SODIUM 5000 UNIT(S): 5000 INJECTION INTRAVENOUS; SUBCUTANEOUS at 14:33

## 2019-03-19 RX ADMIN — HEPARIN SODIUM 5000 UNIT(S): 5000 INJECTION INTRAVENOUS; SUBCUTANEOUS at 22:05

## 2019-03-19 RX ADMIN — Medication 1: at 00:53

## 2019-03-19 RX ADMIN — Medication 166.67 MILLIGRAM(S): at 00:59

## 2019-03-19 RX ADMIN — SODIUM CHLORIDE 1000 MILLILITER(S): 9 INJECTION, SOLUTION INTRAVENOUS at 18:15

## 2019-03-19 NOTE — PHYSICAL THERAPY INITIAL EVALUATION ADULT - GENERAL OBSERVATIONS, REHAB EVAL
This PT spoke with MICU team, pt cleared to work with PT, activity orders updated.Pt benedict 30 min eval fair. Pt rec'd in bed, family at bedside, peripheral IV line, cooper, and NAD. Pt mostly nonverbal, incoherent speech. Pt agreeable to session. [place] speaking, daughter and son at bedside translating.

## 2019-03-19 NOTE — PHYSICAL THERAPY INITIAL EVALUATION ADULT - PERTINENT HX OF CURRENT PROBLEM, REHAB EVAL
74 y/o F with PMH RA, hepatitis A, peripheral neuropathy, former smoker, COPD, Raynaud's disease, GERD presents with decreased appetite, generalized weakness and confusion. Pt with pulmonary emboli, with evidence of b/l LE DVT, s/p IVC filter placement 3/17. Pt a/w hematemesis s/p EGD on 3/16/2019 with presence of a large ulcerated mass in the stomach.

## 2019-03-19 NOTE — PHYSICAL THERAPY INITIAL EVALUATION ADULT - PRECAUTIONS/LIMITATIONS, REHAB EVAL
RESULTS CONTINUED: CT Head: L frontal age-indeterminate infarct with increased cortical attenuation, possibly from recent IV contrast administration or petechial hemorrhagic transformation. Pending MRI Brain. Duplex: DVT of R lower popliteal vein extending into R calf veins. DVT of L lower popliteal vein extending into L calf veins. CT angio Chest: extensive b/l pulmonary emboli involving the lobar, segmental and subsegmental pulmonary arteries. Straightening of the intraventricular septum and reflux of contrast into the IVC concerning for R heart strain. Cirrhotic liver with innumerbal hypodense nodules concerning for metastatic disease. Moderate volume ascites with peritoneal nodularity concerning for peritoneal carcinomatosis. Subcentimeter hypodense renal lesions, too small to characterize. fall precautions/RESULTS CONTINUED: CT Head: L frontal age-indeterminate infarct with increased cortical attenuation, possibly from recent IV contrast administration or petechial hemorrhagic transformation. Pending MRI Brain. Duplex: DVT of R lower popliteal vein extending into R calf veins. DVT of L lower popliteal vein extending into L calf veins. CT angio Chest: extensive b/l pulmonary emboli involving the lobar, segmental and subsegmental pulmonary arteries. Straightening of the intraventricular septum and reflux of contrast into the IVC concerning for R heart strain. Cirrhotic liver with innumerbal hypodense nodules concerning for metastatic disease. Moderate volume ascites with peritoneal nodularity concerning for peritoneal carcinomatosis. Subcentimeter hypodense renal lesions, too small to characterize.

## 2019-03-19 NOTE — PHYSICAL THERAPY INITIAL EVALUATION ADULT - GAIT TRAINING, PT EVAL
GOAL: Pt will ambulate 150 feet with least restrictive device as appropriate independently within 4 weeks

## 2019-03-19 NOTE — CHART NOTE - NSCHARTNOTEFT_GEN_A_CORE
MICU Transfer Note    Transfer from: MICU    Transfer to: X    Accepting Physician:  Signout given to:     MICU COURSE:          ASSESSMENT & PLAN:     73 F w/ pmh former smoker HTN, HLD, RA (colchicine), peripheral neuropathy who presents for decreased appetite, generalized weakness and confusion for 3 days. Patient is a clinic patient at 865 and follow up with Dr. Lema in [facility]. Patient had a colonoscopy in dec/2018. Patient also recently returned from [place] 2/18/19. Patient is denied any chest pain, abdominal pain, sob, fever.  Patient is AxO2, no complaints. She has a history of hepatitis A, her hepatitis B and C were negative in 2015.     #Neuro  -extubated 3/18,  AOX2-3   -CT Head No Cont-INTERPRETATION:  cortically-based hyperattenuation in the left frontal region, corresponding to area of age-indeterminant infarct, is likely related to recent intravenous contrast administration. otherwise no   emergent findings.  -Will get MRI read for further characterization   -Hx of peripheral neuropathy    #Cards  - will restart heparin gtt, per GI with lower PTT goal 40-60  - Will check TTE and LE duplex; may require IVCF; currently EF 65% with normal R ventricular function   - off of pressor support     #resp  Intubated for airway protection in setting of UGI bleed, hematemesis  PE  - Pulmonary emboli likely from hypercoagulable state from malignancy.    - LE dopplers -  with evidence of b/l lower extremity DVT   - Will avoid blood thinning agents, such as heparin, thrombolysis etc.  - IR  placement of IVC filter 3/17    - Will avoid IV fluids given decreased preload and decreased cardiac output, if sbp drops, will consider levo for support  - Maintain Goal sat 90%     #GI  UGIB and hematemesis  - will extubate today as there is no indication at this time to re-scope, as per GI     -NPO  - s/p EGD with findings of ulcerated mass and clotted blood in the stomach  - Now on  ppi BID   - Biopsies pending s/p EGD   - Likely may need sample of ascites.       #Renal  -BUN/Cr sl elevated compared to 1 year prior  -monitor, may worsen in setting of acute blood loss anemia/low volume state  -avoid nephrotoxins   - renally dose meds       #ID  -recent travel to [place]  -hx of hepatitis A  -WBC elevated with 91% neutrophils, afebrile but tachycardic to 92 prior to bleeding  -blood cultures in ED pending, possible sources include ascites however abd exam ok, PNA with LLL opacity on CXR  -de-escalated abx to ceftriaxone ( prophylactic abx course ), NGTD in blood culture, urine culture, urine legionella negative     #VTE  - DVT's b/l of right and left popliteal veins likely in the setting of malignancy     #GOC  - Spoke with patient whom endorsed daughter as surrogate decision maker, GOC discussion          FOR FOLLOW UP: MICU Transfer Note    Transfer from: MICU    Transfer to: X    Accepting Physician: Dr. Ryan  Signout given to:     MICU COURSE:          ASSESSMENT & PLAN:     73 F w/ pmh former smoker HTN, HLD, RA (colchicine), peripheral neuropathy who presents for decreased appetite, generalized weakness and confusion for 3 days. Patient is a clinic patient at 865 and follow up with Dr. Lema in [facility]. Patient had a colonoscopy in dec/2018. Patient also recently returned from [place] 2/18/19. Patient is denied any chest pain, abdominal pain, sob, fever.  Patient is AxO2, no complaints. She has a history of hepatitis A, her hepatitis B and C were negative in 2015.     #Neuro  -extubated 3/18,  AOX2-3   -CT Head No Cont-INTERPRETATION:  cortically-based hyperattenuation in the left frontal region, corresponding to area of age-indeterminant infarct, is likely related to recent intravenous contrast administration. otherwise no   emergent findings.  -Will get MRI read for further characterization   -Hx of peripheral neuropathy    #Cards  - will restart heparin gtt, per GI with lower PTT goal 40-60  - Will check TTE and LE duplex; may require IVCF; currently EF 65% with normal R ventricular function   - off of pressor support     #resp  Intubated for airway protection in setting of UGI bleed, hematemesis  PE  - Pulmonary emboli likely from hypercoagulable state from malignancy.    - LE dopplers -  with evidence of b/l lower extremity DVT   - Will avoid blood thinning agents, such as heparin, thrombolysis etc.  - IR  placement of IVC filter 3/17    - Will avoid IV fluids given decreased preload and decreased cardiac output, if sbp drops, will consider levo for support  - Maintain Goal sat 90%     #GI  UGIB and hematemesis  - will extubate today as there is no indication at this time to re-scope, as per GI     -NPO  - s/p EGD with findings of ulcerated mass and clotted blood in the stomach  - Now on  ppi BID   - Biopsies pending s/p EGD   - Likely may need sample of ascites.       #Renal  -BUN/Cr sl elevated compared to 1 year prior  -monitor, may worsen in setting of acute blood loss anemia/low volume state  -avoid nephrotoxins   - renally dose meds       #ID  -recent travel to [place]  -hx of hepatitis A  -WBC elevated with 91% neutrophils, afebrile but tachycardic to 92 prior to bleeding  -blood cultures in ED pending, possible sources include ascites however abd exam ok, PNA with LLL opacity on CXR  -de-escalated abx to ceftriaxone ( prophylactic abx course ), NGTD in blood culture, urine culture, urine legionella negative     #VTE  - DVT's b/l of right and left popliteal veins likely in the setting of malignancy     #GOC  - Spoke with patient whom endorsed daughter as surrogate decision maker, GOC discussion          FOR FOLLOW UP: MICU Transfer Note    Transfer from: MICU    Transfer to: Medicine 502 D    Accepting Physician: Dr. Ryan  Sign-out given to: Dr. Ryan      MICU COURSE:    73 F w/ PMH of HTN, HLD, former smoker, RA (colchicine), peripheral neuropathy who presented for decreased appetite, generalized weakness and confusion for 3 days. Patient is a clinic patient at 865 and follow up with Dr. Lema in [facility]. Patient had a colonoscopy in December 2018. Patient also recently returned from [place] 2/18/19. Patient denied any chest pain, abdominal pain, sob, fever.  Patient is AxO2, no complaints. She has a history of hepatitis A, her hepatitis B and C were negative in 2015.   3/17: pending TTE, add on pro- BNP, will need to call IR for IVC filter . will consider diuresis after echo . if more stable will consider paracentesis . Dc albumin ( do not want to fluid overload ) , no hepatorenal syndrome right now and no large paracentesis this moment . Will cpap and try to extubate today . Pressures went down . Echo with RV systolic functing WNL. Not well visualized. EF approx 60   VANC LEVEL CAME BACK 6.0; 1500 vancomyocin   Wbc downtrending, hgb donwtrending   O/N: AMS, staring without making eye contact or following commands, stat CT head w/o acute changes, likely hypoactive delirium, self-resolved. D/C'd fentanyl & midazolam, c/w precedex.  3/18:off fentantyl. dvt ppx , now only on ceftriaxone, gi contacted, no further plans,   turn down precedex . consider fluids and lasix if doesnt  - but go through it with attending BE CAUTIOUS if you do this   O/N: ordered 5%Albumin 250cc x1 for decreased UOP. NO VANCOMYOCIN . Will call neuro . Call GI again  Maintainence fluid     Overnight her UOP dropped was briefly given 250 cc of albumin, was given vancomycin, however this was then stopped with plans to continue ceftriaxone only. She was advanced to pureed diet, however patient did not tolerate. Revised read on head CT reported hyperattenuated lesion concerning for possible hemorrhagic transformation, therefore ordered repeat non con CT which showed less attenuation in the left frontal area. Ordered MRI for further characterization, neurology recommendations still pending at this time. For now holding off on heparin gtt until neurology clears patient for anticoagulation for PE, likely following the brain MRI.        ASSESSMENT & PLAN:     73 F w/ pmh former smoker HTN, HLD, RA (colchicine), peripheral neuropathy who presents for decreased appetite, generalized weakness and confusion for 3 days. Patient is a clinic patient at 865 and follow up with Dr. Lema in [facility]. Patient had a colonoscopy in dec/2018. Patient also recently returned from [place] 2/18/19. Patient is denied any chest pain, abdominal pain, sob, fever.  Patient is AxO2, no complaints. She has a history of hepatitis A, her hepatitis B and C were negative in 2015.     #Neuro  -extubated 3/18,  AOX2-3   -CT Head No Cont-INTERPRETATION:  cortically-based hyperattenuation in the left frontal region, corresponding to area of age-indeterminant infarct, is likely related to recent intravenous contrast administration. otherwise no   emergent findings.  -Will get MRI read for further characterization   -Hx of peripheral neuropathy    #Cards  - will restart heparin gtt, per GI with lower PTT goal 40-60  - Will check TTE and LE duplex; may require IVCF; currently EF 65% with normal R ventricular function   - off of pressor support     #Resp  Intubated for airway protection in setting of UGI bleed, hematemesis  PE  - Pulmonary emboli likely from hypercoagulable state from malignancy.    - LE dopplers -  with evidence of b/l lower extremity DVT   - Will avoid blood thinning agents, such as heparin, thrombolysis etc.  - IR  placement of IVC filter 3/17    - Will avoid IV fluids given decreased preload and decreased cardiac output, if sbp drops, will consider levo for support  - Maintain Goal sat 90%     #GI  UGIB and hematemesis  - will extubate today as there is no indication at this time to re-scope, as per GI     -NPO  - s/p EGD with findings of ulcerated mass and clotted blood in the stomach  - Now on  ppi BID   - Biopsies pending s/p EGD   - Likely may need sample of ascites.     #Renal  -BUN/Cr sl elevated compared to 1 year prior  -monitor, may worsen in setting of acute blood loss anemia/low volume state  -avoid nephrotoxins   - renally dose meds     #ID  -recent travel to [place]  -hx of hepatitis A  -WBC elevated with 91% neutrophils, afebrile but tachycardic to 92 prior to bleeding  -blood cultures in ED pending, possible sources include ascites however abd exam ok, PNA with LLL opacity on CXR  -de-escalated abx to ceftriaxone ( prophylactic abx course ), NGTD in blood culture, urine culture, urine legionella negative     #VTE  - DVT's b/l of right and left popliteal veins likely in the setting of malignancy     #GOC  - Spoke with patient whom endorsed daughter as surrogate decision maker, GOC discussion          FOR FOLLOW UP:    [ ] f/u neurology recommendations  [ ] f/u brain MRI  [ ] monitor volume status, may need further IVF MICU Transfer Note    Transfer from: MICU    Transfer to: Medicine 502 D    Accepting Physician: Dr. Ryan  Sign-out given to: Dr. Ryan      MICU COURSE:    73 F w/ PMH of HTN, HLD, former smoker, RA (colchicine), peripheral neuropathy who presented for decreased appetite, generalized weakness and confusion for 3 days. Patient had a colonoscopy in December 2018. She has a history of hepatitis A, her hepatitis B and C were negative in 2015.  Patient also recently returned from [place] 2/18/19. Patient was AxO2, no complaints, and denied any chest pain, abdominal pain, sob, fever. CT revealed metastatic cancer of unknown primary and bilateral PE. with extensive bilateral pulmonary emboli involving the lobar, segmental and subsegmental pulmonary arteries with possible evidence of R heart strain. Troponin unimpressive. TTE with EF 60% and without clear evidence of R heart strain. Patient was started on heparin gtt, however this led to hematemesis requiring intubation for airway protection. She was transferred to the MICU for further management.  Endoscopy done showing large gastric mass, biopsies taken, no intervention done. Heparin gtt discontinued with resolution of bleeding. IVC filter was placed by IR on 3/17 anterior to large IVC thrombus. At the time of IVC filter placement, there was evidence of collaterals. Course c/b hypotension i/s/o GI bleed, and PENNIE with decreased UOP, treated with IV pressors and albumin. Pt was extubated on 3/18. Course c/b intermittent AMS not responding to commands, this self-resolved however given AMS ordered stat head CT. Initial read on CT head was negative for acute change, however revised read on head CT reported hyperattenuated lesion concerning for possible hemorrhagic transformation, therefore ordered repeat non con CT which showed less attenuation in the left frontal area. Planned to resume heparin gtt as well in addition to her IVF filter, however held off given these findings. Ordered MRI for further characterization, neurology recommendations still pending at this time. For now holding off on heparin gtt until neurology clears patient for anticoagulation for PE, likely following the brain MRI. Overnight her UOP dropped was briefly given 250 cc of albumin, was given vancomycin, however this was then stopped with plans to continue ceftriaxone only.       ASSESSMENT & PLAN:     73 F w/ pmh former smoker HTN, HLD, RA (colchicine), peripheral neuropathy who presents for decreased appetite, generalized weakness and confusion for 3 days. Patient is a clinic patient at 865 and follow up with Dr. Lema in [facility]. Patient had a colonoscopy in dec/2018. Patient also recently returned from [place] 2/18/19. Patient is denied any chest pain, abdominal pain, sob, fever.  Patient is AxO2, no complaints. She has a history of hepatitis A, her hepatitis B and C were negative in 2015.     #Neuro  -extubated 3/18,  AOX2-3   -CT Head No Cont-INTERPRETATION:  cortically-based hyperattenuation in the left frontal region, corresponding to area of age-indeterminant infarct, is likely related to recent intravenous contrast administration. otherwise no   emergent findings.  -Will get MRI read for further characterization   -Hx of peripheral neuropathy    #Cards  - will restart heparin gtt, per GI with lower PTT goal 40-60  - Will check TTE and LE duplex; may require IVCF; currently EF 65% with normal R ventricular function   - off of pressor support     #Resp  Intubated for airway protection in setting of UGI bleed, hematemesis  PE  - Pulmonary emboli likely from hypercoagulable state from malignancy.    - LE dopplers -  with evidence of b/l lower extremity DVT   - Will avoid blood thinning agents, such as heparin, thrombolysis etc.  - IR  placement of IVC filter 3/17    - Will avoid IV fluids given decreased preload and decreased cardiac output, if sbp drops, will consider levo for support  - Maintain Goal sat 90%     #GI  UGIB and hematemesis  - will extubate today as there is no indication at this time to re-scope, as per GI     -NPO  - s/p EGD with findings of ulcerated mass and clotted blood in the stomach  - Now on  ppi BID   - Biopsies pending s/p EGD   - Likely may need sample of ascites.     #Renal  -BUN/Cr sl elevated compared to 1 year prior  -monitor, may worsen in setting of acute blood loss anemia/low volume state  -avoid nephrotoxins   - renally dose meds     #ID  -recent travel to [place]  -hx of hepatitis A  -WBC elevated with 91% neutrophils, afebrile but tachycardic to 92 prior to bleeding  -blood cultures in ED pending, possible sources include ascites however abd exam ok, PNA with LLL opacity on CXR  -de-escalated abx to ceftriaxone ( prophylactic abx course ), NGTD in blood culture, urine culture, urine legionella negative     #VTE  - DVT's b/l of right and left popliteal veins likely in the setting of malignancy     #GOC  - Spoke with patient whom endorsed daughter as surrogate decision maker, GOC discussion          FOR FOLLOW UP:    [ ] f/u neurology recommendations  [ ] f/u brain MRI  [ ] monitor volume status, may need further IVF or diuresis  [ ] oncology consult pending pathology results MICU Transfer Note    Transfer from: MICU    Transfer to: Medicine 502 D    Accepting Physician: Dr. Ryan  Sign-out given to: Dr. Ryan      MICU COURSE:    73 F w/ PMH of HTN, HLD, former smoker, RA (colchicine), peripheral neuropathy who presented for decreased appetite, generalized weakness and confusion for 3 days. Patient had a colonoscopy in December 2018. She has a history of hepatitis A, her hepatitis B and C were negative in 2015.  Patient also recently returned from [place] 2/18/19. Patient was AxO2, no complaints, and denied any chest pain, abdominal pain, sob, fever. CT revealed metastatic cancer of unknown primary and bilateral PE. with extensive bilateral pulmonary emboli involving the lobar, segmental and subsegmental pulmonary arteries with possible evidence of R heart strain. Troponin unimpressive. TTE with EF 60% and without clear evidence of R heart strain. Patient was started on heparin gtt, however this led to hematemesis requiring intubation for airway protection. She was transferred to the MICU for further management.  Endoscopy done showing large gastric mass, biopsies taken, no intervention done. Heparin gtt discontinued with resolution of bleeding. IVC filter was placed by IR on 3/17 anterior to large IVC thrombus. At the time of IVC filter placement, there was evidence of collaterals. Course c/b hypotension i/s/o GI bleed, and PENNIE with decreased UOP, treated with IV pressors and albumin. Pt was extubated on 3/18. Course c/b intermittent AMS not responding to commands, this self-resolved however given AMS ordered stat head CT. Initial read on CT head was negative for acute change, however revised read on head CT reported hyperattenuated lesion concerning for possible hemorrhagic transformation, therefore ordered repeat non con CT which showed less attenuation in the left frontal area. Planned to resume heparin gtt as well in addition to her IVF filter, however held off given these findings. Ordered MRI for further characterization, neurology recommendations still pending at this time. For now holding off on heparin gtt until neurology clears patient for anticoagulation for PE, likely following the brain MRI. Overnight her UOP dropped was briefly given 250 cc of albumin, was given vancomycin, however this was then stopped with plans to continue ceftriaxone only.       ASSESSMENT & PLAN:     73 F w/ pmh former smoker HTN, HLD, RA (colchicine), peripheral neuropathy who presents for decreased appetite, generalized weakness and confusion for 3 days. Patient is a clinic patient at 865 and follow up with Dr. Lema in [facility]. Patient had a colonoscopy in dec/2018. Patient also recently returned from [place] 2/18/19. Patient is denied any chest pain, abdominal pain, sob, fever.  Patient is AxO2, no complaints. She has a history of hepatitis A, her hepatitis B and C were negative in 2015.     #Neuro  -extubated 3/18,  AOX2-3   -CT Head No Cont-INTERPRETATION:  cortically-based hyperattenuation in the left frontal region, corresponding to area of age-indeterminant infarct, is likely related to recent intravenous contrast administration. otherwise no   emergent findings.  -Will get MRI read for further characterization   -Hx of peripheral neuropathy    #Cards  - will restart heparin gtt, per GI with lower PTT goal 40-60  - Will check TTE and LE duplex; may require IVCF; currently EF 65% with normal R ventricular function   - off of pressor support     #Resp  Intubated for airway protection in setting of UGI bleed, hematemesis  PE  - Pulmonary emboli likely from hypercoagulable state from malignancy.    - LE dopplers -  with evidence of b/l lower extremity DVT   - Will avoid blood thinning agents, such as heparin, thrombolysis etc.  - IR  placement of IVC filter 3/17    - Will avoid IV fluids given decreased preload and decreased cardiac output, if sbp drops, will consider levo for support  - Maintain Goal sat 90%     #GI  UGIB and hematemesis  - will extubate today as there is no indication at this time to re-scope, as per GI     -NPO  - s/p EGD with findings of ulcerated mass and clotted blood in the stomach  - Now on  ppi BID   - Biopsies pending s/p EGD   - Likely may need sample of ascites.     #Renal  -BUN/Cr sl elevated compared to 1 year prior  -monitor, may worsen in setting of acute blood loss anemia/low volume state  -avoid nephrotoxins   - renally dose meds     #ID  -recent travel to [place]  -hx of hepatitis A  -WBC elevated with 91% neutrophils, afebrile but tachycardic to 92 prior to bleeding  -blood cultures in ED pending, possible sources include ascites however abd exam ok, PNA with LLL opacity on CXR  -de-escalated abx to ceftriaxone ( prophylactic abx course ), NGTD in blood culture, urine culture, urine legionella negative     #VTE  - PE & DVT's b/l of right and left popliteal veins likely in the setting of malignancy     #GOC  - Spoke with patient whom endorsed daughter as surrogate decision maker, GOC discussion        FOR FOLLOW UP:    [ ] f/u neurology recommendations  [ ] f/u brain MRI  [ ] consider resuming heparin gtt for PE/DVT  [ ] monitor Cr & volume status, may need further IVF or diuresis  [ ] oncology consult pending pathology results

## 2019-03-19 NOTE — CONSULT NOTE ADULT - SUBJECTIVE AND OBJECTIVE BOX
DRAFT     Neurology Consult    Name  TAYA DONNELLY    HPI:  73 F w/ pmh former smoker HTN, HLD, RA (colchicine), peripheral neuropathy who presents for decreased appetite, generalized weakness and confusion for 3 days. Patient is a clinic patient at [facility] and follow up with Dr. Lema in [facility]. Patient had a colonoscopy in dec/2018. Patient also recently returned from [place] 2/18/19. Patient is denied any chest pain, abdominal pain, sob, fever.  Patient is AxO2, no complaints. She has a history of hepatitis A, B and C were negative.     In the ED she had hematemesis and was intubated for airway protection. Transferred to the MICU for further care and management. (16 Mar 2019 19:20)      Interval History -        Subjective:      MEDICATIONS  (STANDING):  cefTRIAXone   IVPB 1 Gram(s) IV Intermittent every 24 hours  cefTRIAXone   IVPB      chlorhexidine 4% Liquid 1 Application(s) Topical two times a day  dextrose 5% + sodium chloride 0.9%. 1000 milliLiter(s) (100 mL/Hr) IV Continuous <Continuous>  heparin  Injectable 5000 Unit(s) SubCutaneous every 8 hours  norepinephrine Infusion 0.05 MICROgram(s)/kG/Min (5.306 mL/Hr) IV Continuous <Continuous>  pantoprazole  Injectable 40 milliGRAM(s) IV Push two times a day    MEDICATIONS  (PRN):      Allergies    No Known Allergies    Intolerances        Objective:   Vital Signs Last 24 Hrs  T(C): 36.5 (19 Mar 2019 12:00), Max: 37.2 (19 Mar 2019 00:00)  T(F): 97.7 (19 Mar 2019 12:00), Max: 99 (19 Mar 2019 00:00)  HR: 79 (19 Mar 2019 14:30) (70 - 86)  BP: 101/49 (19 Mar 2019 14:30) (90/51 - 134/63)  BP(mean): 70 (19 Mar 2019 14:30) (66 - 92)  RR: 10 (19 Mar 2019 14:30) (8 - 28)  SpO2: 91% (19 Mar 2019 14:30) (91% - 100%)    General Exam:   General appearance: No acute distress                   Neurological Exam:  Mental Status: AAOx3, fluent speech, follows commands    Cranial Nerves: EOMI, PERRL, V1-V3 intact, facial symmetry intact, no dysarthria, tongue midline, VFF    Motor: 5/5 throughout. No drift x4    Sensation: Intact to LT throughout    Coordination: FTN intact b/l    Reflexes: 1+ bilateral biceps, brachioradialis, patellar and ankle      Gait: normal and stable.      Labs:    03-19    130<L>  |  95<L>  |  58<H>  ----------------------------<  184<H>  4.0   |  18<L>  |  2.12<H>    Ca    8.1<L>      19 Mar 2019 11:23  Phos  4.0     03-19  Mg     2.1     03-19    TPro  5.3<L>  /  Alb  3.4  /  TBili  0.7  /  DBili  x   /  AST  149<H>  /  ALT  172<H>  /  AlkPhos  104  03-19    LIVER FUNCTIONS - ( 19 Mar 2019 11:23 )  Alb: 3.4 g/dL / Pro: 5.3 g/dL / ALK PHOS: 104 U/L / ALT: 172 U/L / AST: 149 U/L / GGT: x           CBC Full  -  ( 19 Mar 2019 11:25 )  WBC Count : 16.9 K/uL  Hemoglobin : 9.8 g/dL  Hematocrit : 28.9 %  Platelet Count - Automated : 96 K/uL  Mean Cell Volume : 90.8 fl  Mean Cell Hemoglobin : 30.7 pg  Mean Cell Hemoglobin Concentration : 33.8 gm/dL  Auto Neutrophil # : 14.8 K/uL  Auto Lymphocyte # : 0.7 K/uL  Auto Monocyte # : 1.2 K/uL  Auto Eosinophil # : 0.1 K/uL  Auto Basophil # : 0.0 K/uL  Auto Neutrophil % : 87.8 %  Auto Lymphocyte % : 4.4 %  Auto Monocyte % : 6.9 %  Auto Eosinophil % : 0.7 %  Auto Basophil % : 0.1 %      Radiology Neurology Consult    Name: TAYA DONNELLY    HPI: 72 yo....woman who presented to [facility] w/ decreased appetite, generalized weakness for 3 days, admitted for b/l PE started on A/C w/ subsequent hematemesis briefly intubated w/ IVC filter in place. Patient also found to have ulcerative gastric mass on EGD suspicious for liver mets on CT. Neurology consulted for concern for multi vascular territorial infarcts of varying ages (b/l hemispheric, right cerebellar punctate). Pertinent hx includes HTN, HLD, peripheral neuropathy, former smoker. NIHSS MRS 0    PMH/PSH:   COPD  GERD  HLD  HTN  RA on colchicine   peripheral neuropathy (unspecified)   Raynaud's  s/p hernia repair (unspecified)    s/p rhinoplasty   s/p tonsillectomy   s/p left cataract extraction  former smoker     MEDICATIONS  (STANDING):  cefTRIAXone   IVPB 1 Gram(s) IV Intermittent every 24 hours  cefTRIAXone   IVPB      chlorhexidine 4% Liquid 1 Application(s) Topical two times a day  dextrose 5% + sodium chloride 0.9%. 1000 milliLiter(s) (100 mL/Hr) IV Continuous <Continuous>  heparin  Injectable 5000 Unit(s) SubCutaneous every 8 hours  norepinephrine Infusion 0.05 MICROgram(s)/kG/Min (5.306 mL/Hr) IV Continuous <Continuous>  pantoprazole  Injectable 40 milliGRAM(s) IV Push two times a day    MEDICATIONS  (PRN):    Allergies  No Known Allergies    Objective:   Vital Signs Last 24 Hrs  T(C): 36.5 (19 Mar 2019 12:00), Max: 37.2 (19 Mar 2019 00:00)  T(F): 97.7 (19 Mar 2019 12:00), Max: 99 (19 Mar 2019 00:00)  HR: 79 (19 Mar 2019 14:30) (70 - 86)  BP: 101/49 (19 Mar 2019 14:30) (90/51 - 134/63)  BP(mean): 70 (19 Mar 2019 14:30) (66 - 92)  RR: 10 (19 Mar 2019 14:30) (8 - 28)  SpO2: 91% (19 Mar 2019 14:30) (91% - 100%)    General Exam:   General appearance: No acute distress                   Neurological Exam:  Mental Status: AAOx3, fluent speech, follows commands    Cranial Nerves: EOMI, PERRL, V1-V3 intact, facial symmetry intact, no dysarthria, tongue midline, VFF    Motor: 5/5 throughout. No drift x4    Sensation: Intact to LT throughout    Coordination: FTN intact b/l    Reflexes: 1+ bilateral biceps, brachioradialis, patellar and ankle    Gait: normal and stable.      Labs:    03-19    130<L>  |  95<L>  |  58<H>  ----------------------------<  184<H>  4.0   |  18<L>  |  2.12<H>    Ca    8.1<L>      19 Mar 2019 11:23  Phos  4.0     03-19  Mg     2.1     03-19    TPro  5.3<L>  /  Alb  3.4  /  TBili  0.7  /  DBili  x   /  AST  149<H>  /  ALT  172<H>  /  AlkPhos  104  03-19    LIVER FUNCTIONS - ( 19 Mar 2019 11:23 )  Alb: 3.4 g/dL / Pro: 5.3 g/dL / ALK PHOS: 104 U/L / ALT: 172 U/L / AST: 149 U/L / GGT: x           CBC Full  -  ( 19 Mar 2019 11:25 )  WBC Count : 16.9 K/uL  Hemoglobin : 9.8 g/dL  Hematocrit : 28.9 %  Platelet Count - Automated : 96 K/uL  Mean Cell Volume : 90.8 fl  Mean Cell Hemoglobin : 30.7 pg  Mean Cell Hemoglobin Concentration : 33.8 gm/dL  Auto Neutrophil # : 14.8 K/uL  Auto Lymphocyte # : 0.7 K/uL  Auto Monocyte # : 1.2 K/uL  Auto Eosinophil # : 0.1 K/uL  Auto Basophil # : 0.0 K/uL  Auto Neutrophil % : 87.8 %  Auto Lymphocyte % : 4.4 %  Auto Monocyte % : 6.9 %  Auto Eosinophil % : 0.7 %  Auto Basophil % : 0.1 %    Radiology  < from: CT Head No Cont (03.19.19 @ 18:28) >  FINDINGS:  There has been significant decrease and gyriform increased   density associated with the left frontal lobe infarct consistent with a   washout of contrast material. A small amount of residual hyperattenuation   remains either representing minimal petechial hemorrhage, laminar   necrosis residual contrast material. Lucency within the left frontal lobe   corresponds with known left frontal infarct.    No hydrocephalus, midline shift or extra-axial collections are   identified.     Age-appropriate involutional changes and mild microvascularischemic   change are present.    Impression:    Decreasing left frontal gyriform hyperattenuation consistent with a   washout of contrast material. Residual gyriform hyperintensity may   represent residual contrast material, petechial hemorrhage or laminar   necrosis. Continued follow-up is recommended.    < end of copied text >  < from: MR Head No Cont (03.20.19 @ 09:22) >  Impression:    Probable subacute left frontal infarct.  Multiple bilateral hemispheric and probable right cerebellar punctate   acute lacunar type infarcts. Findings suggest embolic phenomenon.   Metastatic disease not completely excluded. Follow-up with contrast can   be obtained when clinically feasible.    < end of copied text > Neurology Consult    Name: TAYA DONNELLY    HPI: 72 yo right-handed Polish woman who presented to [facility] w/ decreased appetite, generalized weakness for 3 days, admitted for b/l PE started on A/C w/ subsequent hematemesis briefly intubated w/ IVC filter in place. Patient also found to have ulcerative gastric mass on EGD suspicious for liver mets on CT. ROS also revealed 1-2 weeks of confusion and word finding difficulty as per family (patient is a native Polish). Neurology consulted for concern for multi vascular territorial infarcts of varying ages (b/l hemispheric, right cerebellar punctate). Pertinent hx includes HTN, HLD, peripheral neuropathy, former smoker. NIHSS 7 MRS 0    PMH/PSH:   COPD  GERD  HLD  HTN  RA on colchicine   peripheral neuropathy (unspecified)   Raynaud's  s/p hernia repair (unspecified)    s/p rhinoplasty   s/p tonsillectomy   s/p left cataract extraction  former smoker     MEDICATIONS  (STANDING):  cefTRIAXone   IVPB 1 Gram(s) IV Intermittent every 24 hours  cefTRIAXone   IVPB      chlorhexidine 4% Liquid 1 Application(s) Topical two times a day  dextrose 5% + sodium chloride 0.9%. 1000 milliLiter(s) (100 mL/Hr) IV Continuous <Continuous>  heparin  Injectable 5000 Unit(s) SubCutaneous every 8 hours  norepinephrine Infusion 0.05 MICROgram(s)/kG/Min (5.306 mL/Hr) IV Continuous <Continuous>  pantoprazole  Injectable 40 milliGRAM(s) IV Push two times a day    MEDICATIONS  (PRN):    Allergies  No Known Allergies    Objective:   Vital Signs Last 24 Hrs  T(C): 36.5 (19 Mar 2019 12:00), Max: 37.2 (19 Mar 2019 00:00)  T(F): 97.7 (19 Mar 2019 12:00), Max: 99 (19 Mar 2019 00:00)  HR: 79 (19 Mar 2019 14:30) (70 - 86)  BP: 101/49 (19 Mar 2019 14:30) (90/51 - 134/63)  BP(mean): 70 (19 Mar 2019 14:30) (66 - 92)  RR: 10 (19 Mar 2019 14:30) (8 - 28)  SpO2: 91% (19 Mar 2019 14:30) (91% - 100%)    General Exam:   General appearance: No acute distress                   Neurological Exam:  Mental Status: AAOx2 (person, place only, not time), hypophonia (s/p recent extubation), does not name objects, does not attempt to repeat sentences (Polish speaking) , follows simple commands, difficulty w/ complex commands (FTN, left/right confusion)    Cranial Nerves: EOMI, PERRL, V1-V3 intact, mild right facial fold flattening, no dysarthria but hypophonia (s/p extubation), tongue midline, VFF    Motor: antigravity (effort limited) throughout. + Drift RUE.    Sensation: Intact to LT throughout    Coordination: effort limited FTN on the right (likely due to weakness) no dysmetria w/ left FTN.     Reflexes: toes upgoing b/l (more so on the right)     Gait: not assessed      Labs:    03-19    130<L>  |  95<L>  |  58<H>  ----------------------------<  184<H>  4.0   |  18<L>  |  2.12<H>    Ca    8.1<L>      19 Mar 2019 11:23  Phos  4.0     03-19  Mg     2.1     03-19    TPro  5.3<L>  /  Alb  3.4  /  TBili  0.7  /  DBili  x   /  AST  149<H>  /  ALT  172<H>  /  AlkPhos  104  03-19    LIVER FUNCTIONS - ( 19 Mar 2019 11:23 )  Alb: 3.4 g/dL / Pro: 5.3 g/dL / ALK PHOS: 104 U/L / ALT: 172 U/L / AST: 149 U/L / GGT: x           CBC Full  -  ( 19 Mar 2019 11:25 )  WBC Count : 16.9 K/uL  Hemoglobin : 9.8 g/dL  Hematocrit : 28.9 %  Platelet Count - Automated : 96 K/uL  Mean Cell Volume : 90.8 fl  Mean Cell Hemoglobin : 30.7 pg  Mean Cell Hemoglobin Concentration : 33.8 gm/dL  Auto Neutrophil # : 14.8 K/uL  Auto Lymphocyte # : 0.7 K/uL  Auto Monocyte # : 1.2 K/uL  Auto Eosinophil # : 0.1 K/uL  Auto Basophil # : 0.0 K/uL  Auto Neutrophil % : 87.8 %  Auto Lymphocyte % : 4.4 %  Auto Monocyte % : 6.9 %  Auto Eosinophil % : 0.7 %  Auto Basophil % : 0.1 %    Radiology  < from: CT Head No Cont (03.19.19 @ 18:28) >  FINDINGS:  There has been significant decrease and gyriform increased   density associated with the left frontal lobe infarct consistent with a   washout of contrast material. A small amount of residual hyperattenuation   remains either representing minimal petechial hemorrhage, laminar   necrosis residual contrast material. Lucency within the left frontal lobe   corresponds with known left frontal infarct.    No hydrocephalus, midline shift or extra-axial collections are   identified.     Age-appropriate involutional changes and mild microvascularischemic   change are present.    Impression:    Decreasing left frontal gyriform hyperattenuation consistent with a   washout of contrast material. Residual gyriform hyperintensity may   represent residual contrast material, petechial hemorrhage or laminar   necrosis. Continued follow-up is recommended.    < end of copied text >  < from: MR Head No Cont (03.20.19 @ 09:22) >  Impression:    Probable subacute left frontal infarct.  Multiple bilateral hemispheric and probable right cerebellar punctate   acute lacunar type infarcts. Findings suggest embolic phenomenon.   Metastatic disease not completely excluded. Follow-up with contrast can   be obtained when clinically feasible.    < end of copied text >

## 2019-03-19 NOTE — PHYSICAL THERAPY INITIAL EVALUATION ADULT - ADDITIONAL COMMENTS
History provided by daughter at bedside. Pt lives with daughter in a private home with no steps to enter through garage and first floor set up within. Pt was independent with ADLs and functional mobility prior to admission. Pt would use a RW or straight cane during RA flare ups.

## 2019-03-19 NOTE — CHART NOTE - NSCHARTNOTEFT_GEN_A_CORE
Contacted by primary MICU team regarding use of heparin infusion for management of DVT in setting of gastric mass.    Patient is an increased risk of bleeding from gastric mass, however, is at risk for thromboembolism from DVT. A discussion of risks and benefits of anti-coagulation should be conducted with the patient by the primary team. There is no absolute contraindication to re-start heparin infusion, however, as stated previously, there is an increased risk of bleeding, so the patient should be closely monitored.    Edgardo Hudson MD  Gastroenterology Fellow  Pager number: [phone] / [phone] Contacted by primary MICU team regarding use of heparin infusion for management of DVT in setting of gastric mass.    Patient is an increased risk of bleeding from gastric mass, however, is at risk for thromboembolism from DVT. A discussion of risks and benefits of anti-coagulation should be conducted with the patient by the primary team. There is no absolute contraindication to re-start heparin infusion, however, as stated previously, there is an increased risk of bleeding, so the patient should be closely monitored. Case discussed with GI attending.     Edgardo Hudson MD  Gastroenterology Fellow  Pager number: [phone] / [phone]

## 2019-03-19 NOTE — PROGRESS NOTE ADULT - ASSESSMENT
73 F w/ pmh former smoker HTN, HLD, RA (colchicine), peripheral neuropathy who presents for decreased appetite, generalized weakness and confusion for 3 days. Patient is a clinic patient at 89 and follow up with Dr. Lema in [facility]. Patient had a colonoscopy in dec/2018. Patient also recently returned from [place] 2/18/19. Patient is denied any chest pain, abdominal pain, sob, fever.  Patient is AxO2, no complaints. She has a history of hepatitis A, her hepatitis B and C were negative in 2015.     #Neuro  -intubated/sedated for airway protection in the setting of GIB  -CT Head No Cont-INTERPRETATION:  cortically-based hyperattenuation in the left frontal region, corresponding to area of age-indeterminant infarct, is likely related to recent intravenous contrast administration. otherwise no   emergent findings.  -Hx of peripheral neuropathy    #Cards  - ProBNP - 6259  - Heparin drip for PE stopped in setting of acute bleed  - Will check TTE and LE duplex; may require IVCF; previous echo from 2014 with EF of 35%  - Levo at standby, 2/2 to drop in blood pressure to 60's systolic, now currently mapping 71 off of pressors     #resp  Intubated for airway protection in setting of UGI bleed, hematemesis  PE  - Pulmonary emboli likely from hypercoagulable state from malignancy.    - LE dopplers -  with evidence of b/l lower extremity DVT   - Will avoid blood thinning agents, such as heparin, thrombolysis etc.  - IR  placement of IVC filter 3/17    - Will avoid IV fluids given decreased preload and decreased cardiac output, if sbp drops, will consider levo for support  - Maintain Goal sat 90%     #GI  UGIB and hematemesis  - will extubate today as there is no indication at this time to re-scope, as per GI     -NPO  - s/p EGD with findings of ulcerated mass and clotted blood in the stomach  - Now on  ppi BID   - Biopsies pending s/p EGD   - Likely may need sample of ascites.       #Renal  -BUN/Cr sl elevated compared to 1 year prior  -monitor, may worsen in setting of acute blood loss anemia/low volume state  -avoid nephrotoxins   - renally dose meds       #ID  -recent travel to [place]  -hx of hepatitis A  -WBC elevated with 91% neutrophils, afebrile but tachycardic to 92 prior to bleeding  -blood cultures in ED pending, possible sources include ascites however abd exam ok, PNA with LLL opacity on CXR  -de-escalated abx to ceftriaxone ( prophylactic abx course ), NGTD in blood culture, urine culture, urine legionella negative     #VTE  - DVT's b/l of right and left popliteal veins likely in the setting of malignancy     #GOC  - Spoke with patient whom endorsed daughter as surrogate decision maker, GOC discussion 73 F w/ pmh former smoker HTN, HLD, RA (colchicine), peripheral neuropathy who presents for decreased appetite, generalized weakness and confusion for 3 days. Patient is a clinic patient at 89 and follow up with Dr. Lema in [facility]. Patient had a colonoscopy in dec/2018. Patient also recently returned from [place] 2/18/19. Patient is denied any chest pain, abdominal pain, sob, fever.  Patient is AxO2, no complaints. She has a history of hepatitis A, her hepatitis B and C were negative in 2015.     #Neuro  -intubated/sedated for airway protection in the setting of GIB  -CT Head No Cont-INTERPRETATION:  cortically-based hyperattenuation in the left frontal region, corresponding to area of age-indeterminant infarct, is likely related to recent intravenous contrast administration or petechial hemorrhagic transformation.   -Hx of peripheral neuropathy    #Cards  - ProBNP - 6259  - will hold off on hep gtt until MRI brain to rule out brain bleed  - Echo with EF 65% , Right ventricular with no abnormal function.   - off of pressor support     #resp  Intubated for airway protection in setting of UGI bleed, hematemesis  PE  - Pulmonary emboli likely from hypercoagulable state from malignancy,  with evidence of b/l lower extremity DVT   - IR  placement of IVC filter 3/17    - Maintain Goal sat 90%     #GI  UGIB and hematemesis  - extubated   -advanced to pureed diet, however pt did not tolerate   - Now NPO  - s/p EGD with findings of ulcerated mass and clotted blood in the stomach   - Biopsies pending s/p EGD   - on ppi BID   - Likely may need sample of ascites.       #Renal  -BUN/Cr sl elevated compared to 1 year prior  -monitor, may worsen in setting of acute blood loss anemia/low volume state  -avoid nephrotoxins   - renally dose meds       #ID  -recent travel to [place]  -hx of hepatitis A  -de-escalated abx to ceftriaxone ( prophylactic abx course ), NGTD in blood culture, urine culture, urine legionella negative     #VTE  - DVT's b/l of right and left popliteal veins likely in the setting of malignancy   - on hep subq     #GOC  - Spoke with patient whom endorsed daughter as surrogate decision maker, GOC discussion

## 2019-03-19 NOTE — PROGRESS NOTE ADULT - SUBJECTIVE AND OBJECTIVE BOX
CHIEF COMPLAINT:    Interval Events:    REVIEW OF SYSTEMS:  Constitutional: [ ] negative [ ] fevers [ ] chills [ ] weight loss [ ] weight gain  HEENT: [ ] negative [ ] dry eyes [ ] eye irritation [ ] postnasal drip [ ] nasal congestion  CV: [ ] negative  [ ] chest pain [ ] orthopnea [ ] palpitations [ ] murmur  Resp: [ ] negative [ ] cough [ ] shortness of breath [ ] dyspnea [ ] wheezing [ ] sputum [ ] hemoptysis  GI: [ ] negative [ ] nausea [ ] vomiting [ ] diarrhea [ ] constipation [ ] abd pain [ ] dysphagia   : [ ] negative [ ] dysuria [ ] nocturia [ ] hematuria [ ] increased urinary frequency  Musculoskeletal: [ ] negative [ ] back pain [ ] myalgias [ ] arthralgias [ ] fracture  Skin: [ ] negative [ ] rash [ ] itch  Neurological: [ ] negative [ ] headache [ ] dizziness [ ] syncope [ ] weakness [ ] numbness  Psychiatric: [ ] negative [ ] anxiety [ ] depression  Endocrine: [ ] negative [ ] diabetes [ ] thyroid problem  Hematologic/Lymphatic: [ ] negative [ ] anemia [ ] bleeding problem  Allergic/Immunologic: [ ] negative [ ] itchy eyes [ ] nasal discharge [ ] hives [ ] angioedema  [ ] All other systems negative  [ ] Unable to assess ROS because ________    OBJECTIVE:  ICU Vital Signs Last 24 Hrs  T(C): 37 (19 Mar 2019 04:00), Max: 37.2 (19 Mar 2019 00:00)  T(F): 98.6 (19 Mar 2019 04:00), Max: 99 (19 Mar 2019 00:00)  HR: 83 (19 Mar 2019 06:45) (67 - 87)  BP: 115/56 (19 Mar 2019 06:30) (86/50 - 134/63)  BP(mean): 79 (19 Mar 2019 06:30) (62 - 92)  ABP: --  ABP(mean): --  RR: 17 (19 Mar 2019 06:45) (10 - 28)  SpO2: 97% (19 Mar 2019 06:45) (79% - 100%)    Mode: CPAP with PS, FiO2: 40, PEEP: 5, PS: 5, PIP: 11    03-17 @ 07:01  -  03-18 @ 07:00  --------------------------------------------------------  IN: 1577.5 mL / OUT: 290 mL / NET: 1287.5 mL    03-18 @ 07:01  - 03-19 @ 06:55  --------------------------------------------------------  IN: 1116.5 mL / OUT: 310 mL / NET: 806.5 mL      CAPILLARY BLOOD GLUCOSE      POCT Blood Glucose.: 141 mg/dL (19 Mar 2019 05:53)      PHYSICAL EXAM:  General:   HEENT:   Lymph Nodes:  Neck:   Respiratory:   Cardiovascular:   Abdomen:   Extremities:   Skin:   Neurological:  Psychiatry:    LINES:    HOSPITAL MEDICATIONS:  Standing Meds:  cefTRIAXone   IVPB 1 Gram(s) IV Intermittent every 24 hours  cefTRIAXone   IVPB      chlorhexidine 4% Liquid 1 Application(s) Topical two times a day  heparin  Injectable 5000 Unit(s) SubCutaneous every 8 hours  insulin lispro (HumaLOG) corrective regimen sliding scale   SubCutaneous every 6 hours  norepinephrine Infusion 0.05 MICROgram(s)/kG/Min IV Continuous <Continuous>  pantoprazole  Injectable 40 milliGRAM(s) IV Push two times a day      PRN Meds:      LABS:                        10.1   17.8  )-----------( 83       ( 18 Mar 2019 23:37 )             30.2     Hgb Trend: 10.1<--, 11.0<--, 10.8<--, 10.3<--, 10.5<--  03-18    130<L>  |  95<L>  |  56<H>  ----------------------------<  151<H>  4.2   |  18<L>  |  2.21<H>    Ca    8.2<L>      18 Mar 2019 23:37  Phos  3.9     03-18  Mg     2.0     03-18    TPro  5.5<L>  /  Alb  3.6  /  TBili  0.9  /  DBili  x   /  AST  222<H>  /  ALT  199<H>  /  AlkPhos  102  03-18    Creatinine Trend: 2.21<--, 2.23<--, 2.03<--, 1.76<--, 1.63<--, 1.39<--  PT/INR - ( 18 Mar 2019 23:37 )   PT: 14.7 sec;   INR: 1.27 ratio         PTT - ( 18 Mar 2019 23:37 )  PTT:24.8 sec    Arterial Blood Gas:  03-18 @ 12:41  7.40/32/84/19/97/-4.1  ABG lactate: --  Arterial Blood Gas:  03-17 @ 23:56  7.39/35/142/20/99/-3.6  ABG lactate: --        MICROBIOLOGY:     Culture - Urine (collected 17 Mar 2019 01:30)  Source: .Urine Clean Catch (Midstream)  Final Report (17 Mar 2019 22:15):    No growth    Culture - Blood (collected 16 Mar 2019 17:27)  Source: .Blood Blood-Peripheral  Preliminary Report (17 Mar 2019 18:01):    No growth to date.    Culture - Blood (collected 16 Mar 2019 17:27)  Source: .Blood Blood-Peripheral  Preliminary Report (17 Mar 2019 18:01):    No growth to date.      RADIOLOGY:  [ ] Reviewed and interpreted by me    EKG: CHIEF COMPLAINT:    Interval Events: Advanced to Pureed diet, however patient did not tolerate     REVIEW OF SYSTEMS:  Constitutional: [ ] negative [ ] fevers [ ] chills [ ] weight loss [ ] weight gain  HEENT: [ ] negative [ ] dry eyes [ ] eye irritation [ ] postnasal drip [ ] nasal congestion  CV: [ ] negative  [ ] chest pain [ ] orthopnea [ ] palpitations [ ] murmur  Resp: [ ] negative [ ] cough [ ] shortness of breath [ ] dyspnea [ ] wheezing [ ] sputum [ ] hemoptysis  GI: [ ] negative [ ] nausea [ ] vomiting [ ] diarrhea [ ] constipation [ ] abd pain [ ] dysphagia   : [ ] negative [ ] dysuria [ ] nocturia [ ] hematuria [ ] increased urinary frequency  Musculoskeletal: [ ] negative [ ] back pain [ ] myalgias [ ] arthralgias [ ] fracture  Skin: [ ] negative [ ] rash [ ] itch  Neurological: [ ] negative [ ] headache [ ] dizziness [ ] syncope [ ] weakness [ ] numbness  Psychiatric: [ ] negative [ ] anxiety [ ] depression  Endocrine: [ ] negative [ ] diabetes [ ] thyroid problem  Hematologic/Lymphatic: [ ] negative [ ] anemia [ ] bleeding problem  Allergic/Immunologic: [ ] negative [ ] itchy eyes [ ] nasal discharge [ ] hives [ ] angioedema  [ ] All other systems negative  [ ] Unable to assess ROS because ________    OBJECTIVE:  ICU Vital Signs Last 24 Hrs  T(C): 37 (19 Mar 2019 04:00), Max: 37.2 (19 Mar 2019 00:00)  T(F): 98.6 (19 Mar 2019 04:00), Max: 99 (19 Mar 2019 00:00)  HR: 83 (19 Mar 2019 06:45) (67 - 87)  BP: 115/56 (19 Mar 2019 06:30) (86/50 - 134/63)  BP(mean): 79 (19 Mar 2019 06:30) (62 - 92)  ABP: --  ABP(mean): --  RR: 17 (19 Mar 2019 06:45) (10 - 28)  SpO2: 97% (19 Mar 2019 06:45) (79% - 100%)    Mode: CPAP with PS, FiO2: 40, PEEP: 5, PS: 5, PIP: 11    03-17 @ 07:01  -  03-18 @ 07:00  --------------------------------------------------------  IN: 1577.5 mL / OUT: 290 mL / NET: 1287.5 mL    03-18 @ 07:01  -  03-19 @ 06:55  --------------------------------------------------------  IN: 1116.5 mL / OUT: 310 mL / NET: 806.5 mL      CAPILLARY BLOOD GLUCOSE      POCT Blood Glucose.: 141 mg/dL (19 Mar 2019 05:53)      PHYSICAL EXAM:  General:   HEENT:   Lymph Nodes:  Neck:   Respiratory:   Cardiovascular:   Abdomen:   Extremities:   Skin:   Neurological:  Psychiatry:    LINES:    HOSPITAL MEDICATIONS:  Standing Meds:  cefTRIAXone   IVPB 1 Gram(s) IV Intermittent every 24 hours  cefTRIAXone   IVPB      chlorhexidine 4% Liquid 1 Application(s) Topical two times a day  heparin  Injectable 5000 Unit(s) SubCutaneous every 8 hours  insulin lispro (HumaLOG) corrective regimen sliding scale   SubCutaneous every 6 hours  norepinephrine Infusion 0.05 MICROgram(s)/kG/Min IV Continuous <Continuous>  pantoprazole  Injectable 40 milliGRAM(s) IV Push two times a day      PRN Meds:      LABS:                        10.1   17.8  )-----------( 83       ( 18 Mar 2019 23:37 )             30.2     Hgb Trend: 10.1<--, 11.0<--, 10.8<--, 10.3<--, 10.5<--  03-18    130<L>  |  95<L>  |  56<H>  ----------------------------<  151<H>  4.2   |  18<L>  |  2.21<H>    Ca    8.2<L>      18 Mar 2019 23:37  Phos  3.9     03-18  Mg     2.0     03-18    TPro  5.5<L>  /  Alb  3.6  /  TBili  0.9  /  DBili  x   /  AST  222<H>  /  ALT  199<H>  /  AlkPhos  102  03-18    Creatinine Trend: 2.21<--, 2.23<--, 2.03<--, 1.76<--, 1.63<--, 1.39<--  PT/INR - ( 18 Mar 2019 23:37 )   PT: 14.7 sec;   INR: 1.27 ratio         PTT - ( 18 Mar 2019 23:37 )  PTT:24.8 sec    Arterial Blood Gas:  03-18 @ 12:41  7.40/32/84/19/97/-4.1  ABG lactate: --  Arterial Blood Gas:  03-17 @ 23:56  7.39/35/142/20/99/-3.6  ABG lactate: --        MICROBIOLOGY:     Culture - Urine (collected 17 Mar 2019 01:30)  Source: .Urine Clean Catch (Midstream)  Final Report (17 Mar 2019 22:15):    No growth    Culture - Blood (collected 16 Mar 2019 17:27)  Source: .Blood Blood-Peripheral  Preliminary Report (17 Mar 2019 18:01):    No growth to date.    Culture - Blood (collected 16 Mar 2019 17:27)  Source: .Blood Blood-Peripheral  Preliminary Report (17 Mar 2019 18:01):    No growth to date.      RADIOLOGY:  [ ] Reviewed and interpreted by me    EKG: CHIEF COMPLAINT:    Interval Events:  Overnight her UOP dropped was breifly given 250 cc of albumin , was given Vancomyocin, however this is to be stopped from now on. Should only be on ceftriaxone. Advanced to Pureed diet, however patient did not tolerate . Repeat read on Ct reports ? hemorrhagic transformation, will attain MRI for further characterization. For not will hold off on heparin gtt. Will ask neurology for further information as to when to anticoagulate for PE, following the MRI read.     REVIEW OF SYSTEMS:      OBJECTIVE:  ICU Vital Signs Last 24 Hrs  T(C): 37 (19 Mar 2019 04:00), Max: 37.2 (19 Mar 2019 00:00)  T(F): 98.6 (19 Mar 2019 04:00), Max: 99 (19 Mar 2019 00:00)  HR: 83 (19 Mar 2019 06:45) (67 - 87)  BP: 115/56 (19 Mar 2019 06:30) (86/50 - 134/63)  BP(mean): 79 (19 Mar 2019 06:30) (62 - 92)  ABP: --  ABP(mean): --  RR: 17 (19 Mar 2019 06:45) (10 - 28)  SpO2: 97% (19 Mar 2019 06:45) (79% - 100%)    Mode: CPAP with PS, FiO2: 40, PEEP: 5, PS: 5, PIP: 11    03-17 @ 07:01 - 03-18 @ 07:00  --------------------------------------------------------  IN: 1577.5 mL / OUT: 290 mL / NET: 1287.5 mL    03-18 @ 07:01  -  03-19 @ 06:55  --------------------------------------------------------  IN: 1116.5 mL / OUT: 310 mL / NET: 806.5 mL      CAPILLARY BLOOD GLUCOSE      POCT Blood Glucose.: 141 mg/dL (19 Mar 2019 05:53)      PHYSICAL EXAM:  General:   HEENT:   Lymph Nodes:  Neck:   Respiratory:   Cardiovascular:   Abdomen:   Extremities:   Skin:   Neurological:  Psychiatry:    LINES:    HOSPITAL MEDICATIONS:  Standing Meds:  cefTRIAXone   IVPB 1 Gram(s) IV Intermittent every 24 hours  cefTRIAXone   IVPB      chlorhexidine 4% Liquid 1 Application(s) Topical two times a day  heparin  Injectable 5000 Unit(s) SubCutaneous every 8 hours  insulin lispro (HumaLOG) corrective regimen sliding scale   SubCutaneous every 6 hours  norepinephrine Infusion 0.05 MICROgram(s)/kG/Min IV Continuous <Continuous>  pantoprazole  Injectable 40 milliGRAM(s) IV Push two times a day      PRN Meds:      LABS:                        10.1   17.8  )-----------( 83       ( 18 Mar 2019 23:37 )             30.2     Hgb Trend: 10.1<--, 11.0<--, 10.8<--, 10.3<--, 10.5<--  03-18    130<L>  |  95<L>  |  56<H>  ----------------------------<  151<H>  4.2   |  18<L>  |  2.21<H>    Ca    8.2<L>      18 Mar 2019 23:37  Phos  3.9     03-18  Mg     2.0     03-18    TPro  5.5<L>  /  Alb  3.6  /  TBili  0.9  /  DBili  x   /  AST  222<H>  /  ALT  199<H>  /  AlkPhos  102  03-18    Creatinine Trend: 2.21<--, 2.23<--, 2.03<--, 1.76<--, 1.63<--, 1.39<--  PT/INR - ( 18 Mar 2019 23:37 )   PT: 14.7 sec;   INR: 1.27 ratio         PTT - ( 18 Mar 2019 23:37 )  PTT:24.8 sec    Arterial Blood Gas:  03-18 @ 12:41  7.40/32/84/19/97/-4.1  ABG lactate: --  Arterial Blood Gas:  03-17 @ 23:56  7.39/35/142/20/99/-3.6  ABG lactate: --        MICROBIOLOGY:     Culture - Urine (collected 17 Mar 2019 01:30)  Source: .Urine Clean Catch (Midstream)  Final Report (17 Mar 2019 22:15):    No growth    Culture - Blood (collected 16 Mar 2019 17:27)  Source: .Blood Blood-Peripheral  Preliminary Report (17 Mar 2019 18:01):    No growth to date.    Culture - Blood (collected 16 Mar 2019 17:27)  Source: .Blood Blood-Peripheral  Preliminary Report (17 Mar 2019 18:01):    No growth to date.      RADIOLOGY:  [ ] Reviewed and interpreted by me    EKG:

## 2019-03-20 DIAGNOSIS — J44.9 CHRONIC OBSTRUCTIVE PULMONARY DISEASE, UNSPECIFIED: ICD-10-CM

## 2019-03-20 DIAGNOSIS — M06.9 RHEUMATOID ARTHRITIS, UNSPECIFIED: ICD-10-CM

## 2019-03-20 DIAGNOSIS — N17.9 ACUTE KIDNEY FAILURE, UNSPECIFIED: ICD-10-CM

## 2019-03-20 DIAGNOSIS — K92.2 GASTROINTESTINAL HEMORRHAGE, UNSPECIFIED: ICD-10-CM

## 2019-03-20 DIAGNOSIS — K74.60 UNSPECIFIED CIRRHOSIS OF LIVER: ICD-10-CM

## 2019-03-20 DIAGNOSIS — I10 ESSENTIAL (PRIMARY) HYPERTENSION: ICD-10-CM

## 2019-03-20 DIAGNOSIS — E78.5 HYPERLIPIDEMIA, UNSPECIFIED: ICD-10-CM

## 2019-03-20 DIAGNOSIS — I26.99 OTHER PULMONARY EMBOLISM WITHOUT ACUTE COR PULMONALE: ICD-10-CM

## 2019-03-20 DIAGNOSIS — Z29.9 ENCOUNTER FOR PROPHYLACTIC MEASURES, UNSPECIFIED: ICD-10-CM

## 2019-03-20 DIAGNOSIS — D62 ACUTE POSTHEMORRHAGIC ANEMIA: ICD-10-CM

## 2019-03-20 DIAGNOSIS — R47.01 APHASIA: ICD-10-CM

## 2019-03-20 DIAGNOSIS — I63.9 CEREBRAL INFARCTION, UNSPECIFIED: ICD-10-CM

## 2019-03-20 DIAGNOSIS — C78.7 SECONDARY MALIGNANT NEOPLASM OF LIVER AND INTRAHEPATIC BILE DUCT: ICD-10-CM

## 2019-03-20 LAB
ALBUMIN SERPL ELPH-MCNC: 3.7 G/DL — SIGNIFICANT CHANGE UP (ref 3.3–5)
ALP SERPL-CCNC: 87 U/L — SIGNIFICANT CHANGE UP (ref 40–120)
ALT FLD-CCNC: 123 U/L — HIGH (ref 10–45)
AMMONIA BLD-MCNC: 22 UMOL/L — SIGNIFICANT CHANGE UP (ref 11–55)
ANION GAP SERPL CALC-SCNC: 14 MMOL/L — SIGNIFICANT CHANGE UP (ref 5–17)
APTT BLD: 29.3 SEC — SIGNIFICANT CHANGE UP (ref 27.5–36.3)
AST SERPL-CCNC: 93 U/L — HIGH (ref 10–40)
BASOPHILS # BLD AUTO: 0 K/UL — SIGNIFICANT CHANGE UP (ref 0–0.2)
BASOPHILS NFR BLD AUTO: 0.1 % — SIGNIFICANT CHANGE UP (ref 0–2)
BILIRUB SERPL-MCNC: 0.7 MG/DL — SIGNIFICANT CHANGE UP (ref 0.2–1.2)
BUN SERPL-MCNC: 54 MG/DL — HIGH (ref 7–23)
CALCIUM SERPL-MCNC: 8 MG/DL — LOW (ref 8.4–10.5)
CHLORIDE SERPL-SCNC: 99 MMOL/L — SIGNIFICANT CHANGE UP (ref 96–108)
CO2 SERPL-SCNC: 20 MMOL/L — LOW (ref 22–31)
CREAT SERPL-MCNC: 1.72 MG/DL — HIGH (ref 0.5–1.3)
EOSINOPHIL # BLD AUTO: 0.1 K/UL — SIGNIFICANT CHANGE UP (ref 0–0.5)
EOSINOPHIL NFR BLD AUTO: 0.6 % — SIGNIFICANT CHANGE UP (ref 0–6)
GAS PNL BLDA: SIGNIFICANT CHANGE UP
GLUCOSE BLDC GLUCOMTR-MCNC: 166 MG/DL — HIGH (ref 70–99)
GLUCOSE SERPL-MCNC: 191 MG/DL — HIGH (ref 70–99)
HCT VFR BLD CALC: 24.8 % — LOW (ref 34.5–45)
HGB BLD-MCNC: 8.6 G/DL — LOW (ref 11.5–15.5)
INR BLD: 1.18 RATIO — HIGH (ref 0.88–1.16)
LYMPHOCYTES # BLD AUTO: 0.6 K/UL — LOW (ref 1–3.3)
LYMPHOCYTES # BLD AUTO: 4.5 % — LOW (ref 13–44)
MAGNESIUM SERPL-MCNC: 2 MG/DL — SIGNIFICANT CHANGE UP (ref 1.6–2.6)
MCHC RBC-ENTMCNC: 31.2 PG — SIGNIFICANT CHANGE UP (ref 27–34)
MCHC RBC-ENTMCNC: 34.8 GM/DL — SIGNIFICANT CHANGE UP (ref 32–36)
MCV RBC AUTO: 89.7 FL — SIGNIFICANT CHANGE UP (ref 80–100)
MONOCYTES # BLD AUTO: 1.1 K/UL — HIGH (ref 0–0.9)
MONOCYTES NFR BLD AUTO: 7.6 % — SIGNIFICANT CHANGE UP (ref 2–14)
NEUTROPHILS # BLD AUTO: 12.2 K/UL — HIGH (ref 1.8–7.4)
NEUTROPHILS NFR BLD AUTO: 87.1 % — HIGH (ref 43–77)
NT-PROBNP SERPL-SCNC: 1811 PG/ML — HIGH (ref 0–300)
PHOSPHATE SERPL-MCNC: 3 MG/DL — SIGNIFICANT CHANGE UP (ref 2.5–4.5)
PLATELET # BLD AUTO: 111 K/UL — LOW (ref 150–400)
POTASSIUM SERPL-MCNC: 3.7 MMOL/L — SIGNIFICANT CHANGE UP (ref 3.5–5.3)
POTASSIUM SERPL-SCNC: 3.7 MMOL/L — SIGNIFICANT CHANGE UP (ref 3.5–5.3)
PROT SERPL-MCNC: 5.3 G/DL — LOW (ref 6–8.3)
PROTHROM AB SERPL-ACNC: 13.5 SEC — HIGH (ref 10–12.9)
RBC # BLD: 2.77 M/UL — LOW (ref 3.8–5.2)
RBC # FLD: 13.6 % — SIGNIFICANT CHANGE UP (ref 10.3–14.5)
SODIUM SERPL-SCNC: 133 MMOL/L — LOW (ref 135–145)
WBC # BLD: 14 K/UL — HIGH (ref 3.8–10.5)
WBC # FLD AUTO: 14 K/UL — HIGH (ref 3.8–10.5)

## 2019-03-20 PROCEDURE — 70551 MRI BRAIN STEM W/O DYE: CPT | Mod: 26

## 2019-03-20 PROCEDURE — 99497 ADVNCD CARE PLAN 30 MIN: CPT

## 2019-03-20 PROCEDURE — 99233 SBSQ HOSP IP/OBS HIGH 50: CPT | Mod: GC

## 2019-03-20 PROCEDURE — 99223 1ST HOSP IP/OBS HIGH 75: CPT | Mod: GC

## 2019-03-20 PROCEDURE — 93010 ELECTROCARDIOGRAM REPORT: CPT

## 2019-03-20 RX ORDER — HYDROMORPHONE HYDROCHLORIDE 2 MG/ML
0.25 INJECTION INTRAMUSCULAR; INTRAVENOUS; SUBCUTANEOUS EVERY 4 HOURS
Qty: 0 | Refills: 0 | Status: DISCONTINUED | OUTPATIENT
Start: 2019-03-20 | End: 2019-03-26

## 2019-03-20 RX ORDER — SODIUM CHLORIDE 9 MG/ML
1000 INJECTION, SOLUTION INTRAVENOUS
Qty: 0 | Refills: 0 | Status: DISCONTINUED | OUTPATIENT
Start: 2019-03-20 | End: 2019-03-21

## 2019-03-20 RX ORDER — ATORVASTATIN CALCIUM 80 MG/1
20 TABLET, FILM COATED ORAL AT BEDTIME
Qty: 0 | Refills: 0 | Status: DISCONTINUED | OUTPATIENT
Start: 2019-03-20 | End: 2019-03-22

## 2019-03-20 RX ORDER — HYDROMORPHONE HYDROCHLORIDE 2 MG/ML
0.5 INJECTION INTRAMUSCULAR; INTRAVENOUS; SUBCUTANEOUS EVERY 4 HOURS
Qty: 0 | Refills: 0 | Status: DISCONTINUED | OUTPATIENT
Start: 2019-03-20 | End: 2019-03-26

## 2019-03-20 RX ORDER — ACETAMINOPHEN 500 MG
1000 TABLET ORAL ONCE
Qty: 0 | Refills: 0 | Status: COMPLETED | OUTPATIENT
Start: 2019-03-20 | End: 2019-03-20

## 2019-03-20 RX ADMIN — HYDROMORPHONE HYDROCHLORIDE 0.5 MILLIGRAM(S): 2 INJECTION INTRAMUSCULAR; INTRAVENOUS; SUBCUTANEOUS at 20:12

## 2019-03-20 RX ADMIN — HEPARIN SODIUM 5000 UNIT(S): 5000 INJECTION INTRAVENOUS; SUBCUTANEOUS at 05:46

## 2019-03-20 RX ADMIN — PANTOPRAZOLE SODIUM 40 MILLIGRAM(S): 20 TABLET, DELAYED RELEASE ORAL at 17:27

## 2019-03-20 RX ADMIN — SODIUM CHLORIDE 100 MILLILITER(S): 9 INJECTION, SOLUTION INTRAVENOUS at 17:27

## 2019-03-20 RX ADMIN — CEFTRIAXONE 100 GRAM(S): 500 INJECTION, POWDER, FOR SOLUTION INTRAMUSCULAR; INTRAVENOUS at 10:11

## 2019-03-20 RX ADMIN — SODIUM CHLORIDE 100 MILLILITER(S): 9 INJECTION, SOLUTION INTRAVENOUS at 05:46

## 2019-03-20 RX ADMIN — PANTOPRAZOLE SODIUM 40 MILLIGRAM(S): 20 TABLET, DELAYED RELEASE ORAL at 05:46

## 2019-03-20 RX ADMIN — Medication 400 MILLIGRAM(S): at 19:00

## 2019-03-20 RX ADMIN — Medication 1000 MILLIGRAM(S): at 20:00

## 2019-03-20 RX ADMIN — Medication 50 MILLILITER(S): at 05:45

## 2019-03-20 RX ADMIN — HYDROMORPHONE HYDROCHLORIDE 0.5 MILLIGRAM(S): 2 INJECTION INTRAMUSCULAR; INTRAVENOUS; SUBCUTANEOUS at 20:30

## 2019-03-20 RX ADMIN — ATORVASTATIN CALCIUM 20 MILLIGRAM(S): 80 TABLET, FILM COATED ORAL at 21:38

## 2019-03-20 RX ADMIN — SODIUM CHLORIDE 100 MILLILITER(S): 9 INJECTION, SOLUTION INTRAVENOUS at 07:59

## 2019-03-20 RX ADMIN — CHLORHEXIDINE GLUCONATE 1 APPLICATION(S): 213 SOLUTION TOPICAL at 05:46

## 2019-03-20 NOTE — CONSULT NOTE ADULT - SUBJECTIVE AND OBJECTIVE BOX
HPI:  73 F F hx of HTN, HLD, former smoker, presented with decreased appetite, weakness, confusion. Oncology consulted for gastric mass.    Patient initially presented to [facility] with generalized weakness.  She was AAO x 2 at that time.  CT Chest w Angio showed a cirrhotic liver with multiple nodules suggestive of metastatic disease along with bilateral pulmonary emboli.  Pt was started on a hep gtt, which lead to hematemesis and was intubated and sent to MICU.  IVC filter placed on 3/17.  CT head was done for an acute change in mental status, read as possible hemorrhagic transformation and AC has continued to be held.     Patient had EGD by GI on 3/16, large ulcerated gastric mass was seen and biopsied.  Patient is pending CT A/P.  She was transferred from the MICU to the floor.  Now aphasic, likely 2/2 infarcts seen on MRI.  MRI also suggesting embolic phenomenon, but not ruling out metastatic disease.    Per daughter, patient had been in her usual state of health until she came back from [place] last month.  She was walking around, taking care of her usual daily activities.  After coming back from her trip, she stated she was a bit off, but still able to perform her normal activities.  Did not complain of any headache.  Denies weight loss.  She had progressive fatigue three days prior to admission, prompting family to bring her for evaluation.  She lives in the apartment below her daughter.  Has a smoking hx, 1ppd x 20-30 years, quit 30 years ago.  Denied ETOH.      PAST MEDICAL & SURGICAL HISTORY:  Raynauds disease  GERD (gastroesophageal reflux disease)  HLD (hyperlipidemia)  HTN (hypertension)  COPD (chronic obstructive pulmonary disease)  S/P left cataract extraction  History of tonsillectomy  History of rhinoplasty  History of hernia repair    MEDICATIONS  (STANDING):  atorvastatin 20 milliGRAM(s) Oral at bedtime  cefTRIAXone   IVPB 1 Gram(s) IV Intermittent every 24 hours  cefTRIAXone   IVPB      chlorhexidine 4% Liquid 1 Application(s) Topical two times a day  dextrose 5% + sodium chloride 0.9%. 1000 milliLiter(s) (100 mL/Hr) IV Continuous <Continuous>  pantoprazole  Injectable 40 milliGRAM(s) IV Push two times a day    MEDICATIONS  (PRN):      Allergies    No Known Allergies    Intolerances        SOCIAL HISTORY:    FAMILY HISTORY:  No pertinent family history in first degree relatives      Vital Signs Last 24 Hrs  T(C): 36.6 (20 Mar 2019 07:52), Max: 37.1 (19 Mar 2019 16:00)  T(F): 97.8 (20 Mar 2019 07:52), Max: 98.8 (19 Mar 2019 16:00)  HR: 72 (20 Mar 2019 07:52) (72 - 81)  BP: 105/63 (20 Mar 2019 07:52) (87/47 - 112/55)  BP(mean): 77 (20 Mar 2019 03:20) (62 - 77)  RR: 17 (20 Mar 2019 07:52) (8 - 25)  SpO2: 97% (20 Mar 2019 07:52) (91% - 99%)    PHYSICAL EXAM:    GENERAL: NAD.   HEAD:  NC/AT  EYES: EOMI, PERRLA, no scleral icterus  HEENT: Moist mucous membranes  LUNG: Clear to auscultation bilaterally; No rales, rhonchi, wheezing, or rubs  HEART: RRR; No murmurs, rubs, or gallops  ABDOMEN: +BS, ST/ND/NT  EXTREMITIES:  + multiple bruises b/l UE  LAD: no palpable adenopathy    LABS:                        8.6    14.0  )-----------( 111      ( 20 Mar 2019 00:24 )             24.8     03-20    133<L>  |  99  |  54<H>  ----------------------------<  191<H>  3.7   |  20<L>  |  1.72<H>    Ca    8.0<L>      20 Mar 2019 00:24  Phos  3.0     03-20  Mg     2.0     03-20    TPro  5.3<L>  /  Alb  3.7  /  TBili  0.7  /  DBili  x   /  AST  93<H>  /  ALT  123<H>  /  AlkPhos  87  03-20    PT/INR - ( 20 Mar 2019 00:24 )   PT: 13.5 sec;   INR: 1.18 ratio         PTT - ( 20 Mar 2019 00:24 )  PTT:29.3 sec          RADIOLOGY & ADDITIONAL STUDIES:

## 2019-03-20 NOTE — PROGRESS NOTE ADULT - PROBLEM SELECTOR PLAN 6
CTA showing cirrhotic liver and mod volume ascites  - unclear etiology, ?KIM  - no varices on EGD  - mod ascites, will hold off on tapping given bleeding risk  - c/w empiric CTX given hematemesis in setting of cirrhosis  - thrombocytopenia likely 2/2 cirrhosis CTA showing cirrhotic liver and mod volume ascites. Ammonia 22.  - unclear etiology, ?KIM  - no varices on EGD  - mod ascites, will hold off on tapping given bleeding risk  - c/w empiric CTX given hematemesis in setting of cirrhosis  - thrombocytopenia likely 2/2 cirrhosis

## 2019-03-20 NOTE — OCCUPATIONAL THERAPY INITIAL EVALUATION ADULT - LIVES WITH, PROFILE
Pt lives in a pvt house with daughter/grandchildren. 4 steps to enter and no steps inside. Pt owns a tub shower/no DME or AE

## 2019-03-20 NOTE — PROGRESS NOTE ADULT - PROBLEM SELECTOR PLAN 1
Likely 2/2 ulcerated gastric mass on EGD. Normal esophagus and duodenum.  - s/p intubation and extubation 3/18 for airway protection with acute hematemesis Likely 2/2 ulcerated gastric mass on EGD. Normal esophagus and duodenum.  - s/p intubation and extubation 3/18 for airway protection  - No signs of active bleeding  - Monitor CBC, transfuse for Hgb <7

## 2019-03-20 NOTE — OCCUPATIONAL THERAPY INITIAL EVALUATION ADULT - ADDITIONAL COMMENTS
In the ED she had hematemesis and was intubated for airway protection. Transferred to the MICU for further care and management. CT Head (3/19): Decreasing left frontal gyriform hyperattenuation consistent with a washout of contrast material. Residual gyriform hyperintensity may represent residual contrast material, petechial hemorrhage or laminar necrosis. Continued follow-up is recommended.  Duplex BLE vein scan (3/17): Deep venous thrombosis of the right lower popliteal vein extending into   the right calf veins. Deep venous thrombosis of the left lower popliteal vein extending into the left calf veins. XRay chest (3/16): Left lower lobe opacity which may be secondary to atelectasis or pneumonia. Low lung volumes. In the ED she had hematemesis and was intubated for airway protection. Transferred to the MICU. CT Head (3/19): Decreasing left frontal gyriform hyperattenuation consistent with a washout of contrast material. Residual gyriform hyperintensity may represent residual contrast material, petechial hemorrhage or laminar necrosis. MRI Head (3/20): Probable subacute left frontal infarct. Multiple bilateral hemispheric and probable right cerebellar punctate acute lacunar type infarcts. Findings suggest embolic phenomenon. Metastatic disease not completely excluded. Duplex BLE vein scan (3/17): Deep venous thrombosis of the right lower popliteal vein extending into the right calf veins. Deep venous thrombosis of the left lower popliteal vein extending into the left calf veins. XRay chest (3/16): Left lower lobe opacity which may be secondary to atelectasis or pneumonia. Low lung volumes.

## 2019-03-20 NOTE — PROGRESS NOTE ADULT - SUBJECTIVE AND OBJECTIVE BOX
Alejandro Vargas, PGY1  Pager [phone]    INCOMPLETE NOTE - IN PROGRESS    Patient is a 73y old  Female who presents with a chief complaint of hematemesis (19 Mar 2019 16:28)      SUBJECTIVE/INTERVAL EVENTS: Patient seen and examined at bedside. SAM o/n.    MEDICATIONS  (STANDING):  albumin human 25% IVPB 100 milliLiter(s) IV Intermittent every 6 hours  cefTRIAXone   IVPB 1 Gram(s) IV Intermittent every 24 hours  cefTRIAXone   IVPB      chlorhexidine 4% Liquid 1 Application(s) Topical two times a day  dextrose 5% + sodium chloride 0.9%. 1000 milliLiter(s) (100 mL/Hr) IV Continuous <Continuous>  heparin  Injectable 5000 Unit(s) SubCutaneous every 8 hours  pantoprazole  Injectable 40 milliGRAM(s) IV Push two times a day    MEDICATIONS  (PRN):      VITAL SIGNS:  T(F): 97.8 (03-20-19 @ 00:00), Max: 98.8 (03-19-19 @ 16:00)  HR: 73 (03-20-19 @ 03:20) (72 - 86)  BP: 108/53 (03-20-19 @ 03:20) (87/47 - 112/55)  RR: 15 (03-20-19 @ 03:20) (8 - 25)  SpO2: 94% (03-20-19 @ 03:20) (91% - 99%)    I&O's Summary    19 Mar 2019 07:01  -  20 Mar 2019 07:00  --------------------------------------------------------  IN: 2552.1 mL / OUT: 435 mL / NET: 2117.1 mL      Daily     Daily     PHYSICAL EXAM:  Gen: Alert, well-developed, NAD  HEENT: NCAT, PERRL, EOMI, conjunctiva clear, sclera anicteric, no erythema or exudates in the oropharynx, mmm  Neck: Supple, no JVD  CV: RRR, S1S2, no m/r/g  Resp: CTAB, normal respiratory effort  Abd: Soft, nontender, nondistended, normal bowel sounds  Ext: + peripheral pulses, no edema, clubbing, or cyanosis  Neuro: AOx3, no focal deficits  SKIN: No rashes or lesions    LABS:                        8.6    14.0  )-----------( 111      ( 20 Mar 2019 00:24 )             24.8     Hgb Trend: 8.6<--, 9.8<--, 10.1<--, 11.0<--, 10.8<--  03-20    133<L>  |  99  |  54<H>  ----------------------------<  191<H>  3.7   |  20<L>  |  1.72<H>    Ca    8.0<L>      20 Mar 2019 00:24  Phos  3.0     03-20  Mg     2.0     03-20    TPro  5.3<L>  /  Alb  3.7  /  TBili  0.7  /  DBili  x   /  AST  93<H>  /  ALT  123<H>  /  AlkPhos  87  03-20    Creatinine Trend: 1.72<--, 2.12<--, 2.21<--, 2.23<--, 2.03<--, 1.76<--  LIVER FUNCTIONS - ( 20 Mar 2019 00:24 )  Alb: 3.7 g/dL / Pro: 5.3 g/dL / ALK PHOS: 87 U/L / ALT: 123 U/L / AST: 93 U/L / GGT: x           PT/INR - ( 20 Mar 2019 00:24 )   PT: 13.5 sec;   INR: 1.18 ratio         PTT - ( 20 Mar 2019 00:24 )  PTT:29.3 sec        ABG - ( 20 Mar 2019 02:15 )  pH, Arterial: 7.44  pH, Blood: x     /  pCO2: 32    /  pO2: 62    / HCO3: 22    / Base Excess: -1.7  /  SaO2: 92                CAPILLARY BLOOD GLUCOSE      POCT Blood Glucose.: 166 mg/dL (20 Mar 2019 06:43)  POCT Blood Glucose.: 186 mg/dL (19 Mar 2019 19:10)  POCT Blood Glucose.: 185 mg/dL (19 Mar 2019 11:18)      RADIOLOGY & ADDITIONAL TESTS: Reviewed    Imaging Personally Reviewed:    Consultant(s) Notes Reviewed:      Care Discussed with Consultants/Other Providers: Alejandro Vargas, PGY1  Pager [phone]      Patient is a 73y old  Female who presents with a chief complaint of hematemesis (19 Mar 2019 16:28)      SUBJECTIVE/INTERVAL EVENTS: Patient seen and examined at bedside. SAM o/n. Alert, aphasic. Gestures no with head and hands when asked if she is having any symptoms including pain and bleeding. Reportedly said "no" in response to daughter's questioning at bedside this AM.    MEDICATIONS  (STANDING):  albumin human 25% IVPB 100 milliLiter(s) IV Intermittent every 6 hours  cefTRIAXone   IVPB 1 Gram(s) IV Intermittent every 24 hours  cefTRIAXone   IVPB      chlorhexidine 4% Liquid 1 Application(s) Topical two times a day  dextrose 5% + sodium chloride 0.9%. 1000 milliLiter(s) (100 mL/Hr) IV Continuous <Continuous>  heparin  Injectable 5000 Unit(s) SubCutaneous every 8 hours  pantoprazole  Injectable 40 milliGRAM(s) IV Push two times a day    MEDICATIONS  (PRN):      VITAL SIGNS:  T(F): 97.8 (03-20-19 @ 00:00), Max: 98.8 (03-19-19 @ 16:00)  HR: 73 (03-20-19 @ 03:20) (72 - 86)  BP: 108/53 (03-20-19 @ 03:20) (87/47 - 112/55)  RR: 15 (03-20-19 @ 03:20) (8 - 25)  SpO2: 94% (03-20-19 @ 03:20) (91% - 99%)    I&O's Summary    19 Mar 2019 07:01  -  20 Mar 2019 07:00  --------------------------------------------------------  IN: 2552.1 mL / OUT: 435 mL / NET: 2117.1 mL      Daily     Daily     PHYSICAL EXAM:  Gen: Alert, NAD  HEENT: NCAT, EOMI, conjunctiva clear, sclera anicteric  Neck: Supple, no JVD  CV: RRR, S1S2, no m/r/g  Resp: CTAB, normal respiratory effort  Abd: Soft, nontender, mildly distended, normal bowel sounds  Ext: + peripheral pulses, no edema, clubbing, or cyanosis,   Neuro: Alert, aphasic, follow commands  SKIN: Stable ecchymoses on bilateral forearms    LABS:                        8.6    14.0  )-----------( 111      ( 20 Mar 2019 00:24 )             24.8     Hgb Trend: 8.6<--, 9.8<--, 10.1<--, 11.0<--, 10.8<--  03-20    133<L>  |  99  |  54<H>  ----------------------------<  191<H>  3.7   |  20<L>  |  1.72<H>    Ca    8.0<L>      20 Mar 2019 00:24  Phos  3.0     03-20  Mg     2.0     03-20    TPro  5.3<L>  /  Alb  3.7  /  TBili  0.7  /  DBili  x   /  AST  93<H>  /  ALT  123<H>  /  AlkPhos  87  03-20    Creatinine Trend: 1.72<--, 2.12<--, 2.21<--, 2.23<--, 2.03<--, 1.76<--  LIVER FUNCTIONS - ( 20 Mar 2019 00:24 )  Alb: 3.7 g/dL / Pro: 5.3 g/dL / ALK PHOS: 87 U/L / ALT: 123 U/L / AST: 93 U/L / GGT: x           PT/INR - ( 20 Mar 2019 00:24 )   PT: 13.5 sec;   INR: 1.18 ratio         PTT - ( 20 Mar 2019 00:24 )  PTT:29.3 sec        ABG - ( 20 Mar 2019 02:15 )  pH, Arterial: 7.44  pH, Blood: x     /  pCO2: 32    /  pO2: 62    / HCO3: 22    / Base Excess: -1.7  /  SaO2: 92                CAPILLARY BLOOD GLUCOSE      POCT Blood Glucose.: 166 mg/dL (20 Mar 2019 06:43)  POCT Blood Glucose.: 186 mg/dL (19 Mar 2019 19:10)  POCT Blood Glucose.: 185 mg/dL (19 Mar 2019 11:18)      RADIOLOGY & ADDITIONAL TESTS: Reviewed    Imaging Personally Reviewed:    Consultant(s) Notes Reviewed:      Care Discussed with Consultants/Other Providers:

## 2019-03-20 NOTE — OCCUPATIONAL THERAPY INITIAL EVALUATION ADULT - COGNITIVE, VISUAL PERCEPTUAL, OT EVAL
GOAL: Pt will follow 100% of single step commands to increase participation in functional tasks in 4 weeks

## 2019-03-20 NOTE — PROGRESS NOTE ADULT - PROBLEM SELECTOR PLAN 4
Extensive b/l PE likely 2/2 malignancy and b/l LE DVT  - s/p IVC filter  - monitor O2 sat, currently on 2L NC  - monitor off AC, risks benefits of AC discussed with family Extensive b/l PE likely 2/2 malignancy and b/l LE DVT. TTE: EF 66% with normal LV/RVSF.  - s/p IVC filter  - monitor O2 sat, currently on 2L NC  - monitor off AC, risks benefits of AC discussed with family

## 2019-03-20 NOTE — OCCUPATIONAL THERAPY INITIAL EVALUATION ADULT - BALANCE TRAINING, PT EVAL
GOAL: Pt will increase dynamic standing balance to fair (+) to increase participation in ADLs in 4 weeks

## 2019-03-20 NOTE — OCCUPATIONAL THERAPY INITIAL EVALUATION ADULT - BALANCE DISTURBANCE, IDENTIFIED IMPAIRMENT CONTRIBUTE, REHAB EVAL
decreased strength/impaired motor control/impaired postural control/impaired coordination/decreased ROM

## 2019-03-20 NOTE — PROGRESS NOTE ADULT - ASSESSMENT
73y F HTN, HLD, former smoker with COPD, RA presented with several days of decreased appetite, weakness, FTT found to have bilateral PE, started on heparin gtt, course c/b hematemesis s/p intubation and EGD showing ulcerated gastric mass, now s/p extubation 73y F HTN, HLD, COPD, GERD, RA presented with several days of decreased appetite, weakness, FTT found to have bilateral PE, was initially started on heparin gtt now reversed and off AC due to hematemesis, s/p intubation and extubation, s/p IVC filter. Patient found to have ulcerated gastric mass on EGD and liver cirrhosis with likely mets on CT.

## 2019-03-20 NOTE — PROGRESS NOTE ADULT - PROBLEM SELECTOR PLAN 2
CTH: Left frontal age-indeterminate infarct. Residual gyriform hyperintensity may represent residual contrast material, petechial hemorrhage or laminar necrosis.  MRH: Probable subacute left frontal infarct. Multiple bilateral hemispheric and probable right cerebellar punctate acute lacunar type infarcts. Findings suggest embolic phenomenon. Metastatic disease not completely excluded.  - will monitor off antiplatelet given risk of bleeding  - c/w atorva 20

## 2019-03-20 NOTE — PROGRESS NOTE ADULT - PROBLEM SELECTOR PLAN 7
Likely 2/2 hypoperfusion in setting of GI bleed and decreased PO intake vs contrast-induced nephropathy  - c/w Likely 2/2 hypoperfusion in setting of GI bleed and decreased PO intake (specific gravity >1.05) vs contrast-induced nephropathy.  - c/w D5NS@100cc and oral hydration

## 2019-03-20 NOTE — PROGRESS NOTE ADULT - PROBLEM SELECTOR PLAN 3
Has been aphasic since extubation. Normal speech at baseline although she was less interested in talking over the past several days PTA.  - presumably 2/2 CVA, will monitor  - Follows commands and appears to have understanding

## 2019-03-20 NOTE — OCCUPATIONAL THERAPY INITIAL EVALUATION ADULT - MD ORDER
OT Initial Evaluation  Ambulate As Tolerated OT Initial Evaluation  Ambulate As Tolerated  Functional Evaluation (3/21)

## 2019-03-20 NOTE — OCCUPATIONAL THERAPY INITIAL EVALUATION ADULT - OCCUPATION
Koh Intro Text (From The.....): A KOH prep was ordered and evaluated from the Detail Level: Detailed Showing: no pathologic findings Koh Procedure Text (Tissue Harvesting Technique): The skin scrapings were placed on a glass slide, covered with a coverslip and a KOH solution was applied. Retired

## 2019-03-20 NOTE — OCCUPATIONAL THERAPY INITIAL EVALUATION ADULT - PERTINENT HX OF CURRENT PROBLEM, REHAB EVAL
73F w/ pmh former smoker HTN, HLD, RA (colchicine), peripheral neuropathy who presents for decreased appetite, generalized weakness and confusion for 3 days. Patient is a clinic patient at [facility] and follow up with Dr. Lema in [facility]. Patient had a colonoscopy in dec/2018. Patient also recently returned from [place] 2/18/19. Patient is denied any chest pain, abdominal pain, sob, fever.  Patient is AxO2, no complaints. She has a history of hepatitis A, B and C were negative.

## 2019-03-20 NOTE — CONSULT NOTE ADULT - ASSESSMENT
INCOMPLETE  73 F F hx of HTN, HLD, former smoker, presented with decreased appetite, weakness, confusion. Oncology consulted for gastric mass.    Gastric mass, with likely metastatic disease in liver, possibly brain  -s/p EGD with biopsy on 3/16, pending results  -please obtain CT A/P  -CEA 55,  257, CA 19-9 >100,000, AFP 23.2  -patient will eventually need outpatient PET CT to further characterize extent of disease 73 F F hx of HTN, HLD, former smoker, presented with decreased appetite, weakness, confusion. Oncology consulted for gastric mass.    Gastric mass, with likely metastatic disease in liver, possibly brain  -s/p EGD with biopsy on 3/16, pathology showing moderately differentiated gastric adenocarcinoma.  [id number] status pending.  -please obtain MRI A/P W/WO contrast  -CEA 55,  257, CA 19-9 >100,000, AFP 23.2  -patient will eventually need outpatient PET CT to further characterize extent of disease  -spoke today with patient, patient's daughter, son and sister.  Explained that this is gastric cancer, with further imaging pending.  However, the treatment is likely chemotherapy.  Her current performance status has declined.  In order to receive disease modifying treatment for her disease, she will need to get stronger.  Upon discharge, referral to [facility] can be made to follow up with a GI oncologist.  No plans for inpatient treatment.    Gaye Teresa DO  Hematology/Oncology Fellow, PGY4  Pager: [phone]

## 2019-03-20 NOTE — PROGRESS NOTE ADULT - PROBLEM SELECTOR PLAN 5
CT CTA showing innumerable hypodense liver nodules concerning for metastatic disease  - elevated , CA 19-9, AFP  - possibly gastric primary, f/u path CTA showing innumerable hypodense liver nodules and peritoneal nodularities concerning for metastatic disease and peritoneal carcinomatosis  - elevated , CA 19-9, AFP suspicious for GI malignancy  - possibly gastric primary, f/u path CTA showing innumerable hypodense liver nodules and peritoneal nodularities concerning for metastatic disease and peritoneal carcinomatosis  - elevated , CA 19-9, AFP suspicious for GI malignancy  - possibly gastric primary, f/u path  - will hold off on CTAP until can tolerate IV contrast

## 2019-03-20 NOTE — OCCUPATIONAL THERAPY INITIAL EVALUATION ADULT - PLANNED THERAPY INTERVENTIONS, OT EVAL
ADL retraining/balance training/bed mobility training/strengthening/transfer training/cognitive, visual perceptual/ROM

## 2019-03-20 NOTE — OCCUPATIONAL THERAPY INITIAL EVALUATION ADULT - IMPAIRED TRANSFERS: SIT/STAND, REHAB EVAL
impaired balance/decreased strength/decreased flexibility/impaired motor control/cognition/impaired coordination/impaired postural control

## 2019-03-20 NOTE — CHART NOTE - NSCHARTNOTEFT_GEN_A_CORE
GI CHART NOTE    Path from 3/16 EGD showed moderately differentiated adenocarcinoma.     Oncology consulted.     Call GI with questions.

## 2019-03-21 ENCOUNTER — RESULT REVIEW (OUTPATIENT)
Age: 74
End: 2019-03-21

## 2019-03-21 DIAGNOSIS — R53.2 FUNCTIONAL QUADRIPLEGIA: ICD-10-CM

## 2019-03-21 DIAGNOSIS — C16.0 MALIGNANT NEOPLASM OF CARDIA: ICD-10-CM

## 2019-03-21 DIAGNOSIS — Z51.5 ENCOUNTER FOR PALLIATIVE CARE: ICD-10-CM

## 2019-03-21 DIAGNOSIS — R64 CACHEXIA: ICD-10-CM

## 2019-03-21 LAB
ACANTHOCYTES BLD QL SMEAR: SLIGHT — SIGNIFICANT CHANGE UP
ALBUMIN FLD-MCNC: 2.3 G/DL — SIGNIFICANT CHANGE UP
ALBUMIN SERPL ELPH-MCNC: 3.3 G/DL — SIGNIFICANT CHANGE UP (ref 3.3–5)
ALP SERPL-CCNC: 112 U/L — SIGNIFICANT CHANGE UP (ref 40–120)
ALT FLD-CCNC: 97 U/L — HIGH (ref 10–45)
ANION GAP SERPL CALC-SCNC: 15 MMOL/L — SIGNIFICANT CHANGE UP (ref 5–17)
ANISOCYTOSIS BLD QL: SLIGHT — SIGNIFICANT CHANGE UP
APTT BLD: 27.2 SEC — LOW (ref 27.5–36.3)
AST SERPL-CCNC: 66 U/L — HIGH (ref 10–40)
B PERT IGG+IGM PNL SER: ABNORMAL
BASOPHILS # BLD AUTO: 0 K/UL — SIGNIFICANT CHANGE UP (ref 0–0.2)
BASOPHILS NFR BLD AUTO: 0 % — SIGNIFICANT CHANGE UP (ref 0–2)
BILIRUB SERPL-MCNC: 0.5 MG/DL — SIGNIFICANT CHANGE UP (ref 0.2–1.2)
BUN SERPL-MCNC: 41 MG/DL — HIGH (ref 7–23)
BURR CELLS BLD QL SMEAR: PRESENT — SIGNIFICANT CHANGE UP
CALCIUM SERPL-MCNC: 7.9 MG/DL — LOW (ref 8.4–10.5)
CHLORIDE SERPL-SCNC: 104 MMOL/L — SIGNIFICANT CHANGE UP (ref 96–108)
CO2 SERPL-SCNC: 17 MMOL/L — LOW (ref 22–31)
COLOR FLD: SIGNIFICANT CHANGE UP
CREAT SERPL-MCNC: 1.32 MG/DL — HIGH (ref 0.5–1.3)
CULTURE RESULTS: SIGNIFICANT CHANGE UP
CULTURE RESULTS: SIGNIFICANT CHANGE UP
DACRYOCYTES BLD QL SMEAR: SLIGHT — SIGNIFICANT CHANGE UP
ELLIPTOCYTES BLD QL SMEAR: SLIGHT — SIGNIFICANT CHANGE UP
EOSINOPHIL # BLD AUTO: 0.2 K/UL — SIGNIFICANT CHANGE UP (ref 0–0.5)
EOSINOPHIL # FLD: 1 % — SIGNIFICANT CHANGE UP
EOSINOPHIL NFR BLD AUTO: 1.6 % — SIGNIFICANT CHANGE UP (ref 0–6)
FLUID INTAKE SUBSTANCE CLASS: SIGNIFICANT CHANGE UP
FLUID SEGMENTED GRANULOCYTES: 17 % — SIGNIFICANT CHANGE UP
GLUCOSE FLD-MCNC: 143 MG/DL — SIGNIFICANT CHANGE UP
GLUCOSE SERPL-MCNC: 183 MG/DL — HIGH (ref 70–99)
HCT VFR BLD CALC: 29.2 % — LOW (ref 34.5–45)
HGB BLD-MCNC: 9.8 G/DL — LOW (ref 11.5–15.5)
INR BLD: 1.04 RATIO — SIGNIFICANT CHANGE UP (ref 0.88–1.16)
LDH SERPL L TO P-CCNC: 1648 U/L — SIGNIFICANT CHANGE UP
LYMPHOCYTES # BLD AUTO: 0.6 K/UL — LOW (ref 1–3.3)
LYMPHOCYTES # BLD AUTO: 4 % — LOW (ref 13–44)
LYMPHOCYTES # FLD: 33 % — SIGNIFICANT CHANGE UP
MACROCYTES BLD QL: SLIGHT — SIGNIFICANT CHANGE UP
MAGNESIUM SERPL-MCNC: 2 MG/DL — SIGNIFICANT CHANGE UP (ref 1.6–2.6)
MCHC RBC-ENTMCNC: 30.7 PG — SIGNIFICANT CHANGE UP (ref 27–34)
MCHC RBC-ENTMCNC: 33.5 GM/DL — SIGNIFICANT CHANGE UP (ref 32–36)
MCV RBC AUTO: 91.6 FL — SIGNIFICANT CHANGE UP (ref 80–100)
MESOTHL CELL # FLD: 2 % — SIGNIFICANT CHANGE UP
MICROCYTES BLD QL: SLIGHT — SIGNIFICANT CHANGE UP
MONOCYTES # BLD AUTO: 1.6 K/UL — HIGH (ref 0–0.9)
MONOCYTES NFR BLD AUTO: 11.1 % — SIGNIFICANT CHANGE UP (ref 2–14)
MONOS+MACROS # FLD: 47 % — SIGNIFICANT CHANGE UP
NEUTROPHILS # BLD AUTO: 12 K/UL — HIGH (ref 1.8–7.4)
NEUTROPHILS NFR BLD AUTO: 83.3 % — HIGH (ref 43–77)
PHOSPHATE SERPL-MCNC: 2.1 MG/DL — LOW (ref 2.5–4.5)
PLAT MORPH BLD: NORMAL — SIGNIFICANT CHANGE UP
PLATELET # BLD AUTO: 150 K/UL — SIGNIFICANT CHANGE UP (ref 150–400)
POIKILOCYTOSIS BLD QL AUTO: SLIGHT — SIGNIFICANT CHANGE UP
POLYCHROMASIA BLD QL SMEAR: SLIGHT — SIGNIFICANT CHANGE UP
POTASSIUM SERPL-MCNC: 3.6 MMOL/L — SIGNIFICANT CHANGE UP (ref 3.5–5.3)
POTASSIUM SERPL-SCNC: 3.6 MMOL/L — SIGNIFICANT CHANGE UP (ref 3.5–5.3)
PROT FLD-MCNC: 3.6 G/DL — SIGNIFICANT CHANGE UP
PROT SERPL-MCNC: 5.4 G/DL — LOW (ref 6–8.3)
PROTHROM AB SERPL-ACNC: 12 SEC — SIGNIFICANT CHANGE UP (ref 10–12.9)
RBC # BLD: 3.19 M/UL — LOW (ref 3.8–5.2)
RBC # FLD: 14.1 % — SIGNIFICANT CHANGE UP (ref 10.3–14.5)
RBC BLD AUTO: ABNORMAL
RCV VOL RI: 3600 /UL — HIGH (ref 0–5)
SODIUM SERPL-SCNC: 136 MMOL/L — SIGNIFICANT CHANGE UP (ref 135–145)
SPECIMEN SOURCE: SIGNIFICANT CHANGE UP
SPECIMEN SOURCE: SIGNIFICANT CHANGE UP
TARGETS BLD QL SMEAR: SLIGHT — SIGNIFICANT CHANGE UP
TOTAL NUCLEATED CELL COUNT, BODY FLUID: 28 /UL — HIGH (ref 0–5)
TUBE TYPE: SIGNIFICANT CHANGE UP
WBC # BLD: 14.4 K/UL — HIGH (ref 3.8–10.5)
WBC # FLD AUTO: 14.4 K/UL — HIGH (ref 3.8–10.5)

## 2019-03-21 PROCEDURE — 76700 US EXAM ABDOM COMPLETE: CPT | Mod: 26

## 2019-03-21 PROCEDURE — 88112 CYTOPATH CELL ENHANCE TECH: CPT | Mod: 26

## 2019-03-21 PROCEDURE — 88305 TISSUE EXAM BY PATHOLOGIST: CPT | Mod: 26

## 2019-03-21 PROCEDURE — 74177 CT ABD & PELVIS W/CONTRAST: CPT | Mod: 26

## 2019-03-21 PROCEDURE — 99223 1ST HOSP IP/OBS HIGH 75: CPT

## 2019-03-21 PROCEDURE — 71045 X-RAY EXAM CHEST 1 VIEW: CPT | Mod: 26

## 2019-03-21 PROCEDURE — 49083 ABD PARACENTESIS W/IMAGING: CPT

## 2019-03-21 PROCEDURE — 99233 SBSQ HOSP IP/OBS HIGH 50: CPT | Mod: GC

## 2019-03-21 RX ORDER — SODIUM CHLORIDE 9 MG/ML
1000 INJECTION, SOLUTION INTRAVENOUS
Qty: 0 | Refills: 0 | Status: DISCONTINUED | OUTPATIENT
Start: 2019-03-21 | End: 2019-03-22

## 2019-03-21 RX ORDER — SODIUM,POTASSIUM PHOSPHATES 278-250MG
1 POWDER IN PACKET (EA) ORAL
Qty: 0 | Refills: 0 | Status: COMPLETED | OUTPATIENT
Start: 2019-03-21 | End: 2019-03-21

## 2019-03-21 RX ADMIN — PANTOPRAZOLE SODIUM 40 MILLIGRAM(S): 20 TABLET, DELAYED RELEASE ORAL at 05:57

## 2019-03-21 RX ADMIN — HYDROMORPHONE HYDROCHLORIDE 0.5 MILLIGRAM(S): 2 INJECTION INTRAMUSCULAR; INTRAVENOUS; SUBCUTANEOUS at 17:00

## 2019-03-21 RX ADMIN — ATORVASTATIN CALCIUM 20 MILLIGRAM(S): 80 TABLET, FILM COATED ORAL at 22:03

## 2019-03-21 RX ADMIN — SODIUM CHLORIDE 75 MILLILITER(S): 9 INJECTION, SOLUTION INTRAVENOUS at 11:44

## 2019-03-21 RX ADMIN — SODIUM CHLORIDE 100 MILLILITER(S): 9 INJECTION, SOLUTION INTRAVENOUS at 05:57

## 2019-03-21 RX ADMIN — HYDROMORPHONE HYDROCHLORIDE 0.5 MILLIGRAM(S): 2 INJECTION INTRAMUSCULAR; INTRAVENOUS; SUBCUTANEOUS at 09:55

## 2019-03-21 RX ADMIN — PANTOPRAZOLE SODIUM 40 MILLIGRAM(S): 20 TABLET, DELAYED RELEASE ORAL at 22:03

## 2019-03-21 RX ADMIN — CEFTRIAXONE 100 GRAM(S): 500 INJECTION, POWDER, FOR SOLUTION INTRAMUSCULAR; INTRAVENOUS at 09:56

## 2019-03-21 RX ADMIN — HYDROMORPHONE HYDROCHLORIDE 0.5 MILLIGRAM(S): 2 INJECTION INTRAMUSCULAR; INTRAVENOUS; SUBCUTANEOUS at 11:32

## 2019-03-21 RX ADMIN — SODIUM CHLORIDE 75 MILLILITER(S): 9 INJECTION, SOLUTION INTRAVENOUS at 22:04

## 2019-03-21 RX ADMIN — Medication 1 TABLET(S): at 22:03

## 2019-03-21 RX ADMIN — HYDROMORPHONE HYDROCHLORIDE 0.5 MILLIGRAM(S): 2 INJECTION INTRAMUSCULAR; INTRAVENOUS; SUBCUTANEOUS at 16:27

## 2019-03-21 NOTE — PROGRESS NOTE ADULT - PROBLEM SELECTOR PLAN 4
Extensive b/l PE likely 2/2 malignancy and b/l LE DVT. TTE: EF 66% with normal LV/RVSF.  - s/p IVC filter  - monitor O2 sat, currently on 2L NC  - monitor off AC, risks benefits of AC discussed with family

## 2019-03-21 NOTE — PROGRESS NOTE ADULT - ASSESSMENT
73y F HTN, HLD, COPD, GERD, RA presented with several days of decreased appetite, weakness, FTT found to have bilateral PE, was initially started on heparin gtt now reversed and off AC due to hematemesis, s/p intubation and extubation, s/p IVC filter. Patient found to have ulcerated gastric mass on EGD and liver cirrhosis with likely mets on CT. 73y F HTN, HLD, COPD, GERD, RA presented with several days of decreased appetite, weakness, FTT found to have bilateral PE, was initially started on heparin gtt now s/p reversal and off AC due to hematemesis, s/p intubation and extubation, s/p IVC filter. 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

## 2019-03-21 NOTE — PROGRESS NOTE ADULT - SUBJECTIVE AND OBJECTIVE BOX
73 F w/ pmh former smoker HTN, HLD, RA (colchicine), Gastic CA with PENNIE with ascites presented to IR for paracentesis.   Paracentesis for comfort per Dr. ANDREWS will remove to completion if BP is stable.     Allergies: No Known Allergies      PAST MEDICAL & SURGICAL HISTORY:  Raynauds disease  GERD (gastroesophageal reflux disease)  HLD (hyperlipidemia)  HTN (hypertension)  COPD (chronic obstructive pulmonary disease)  S/P left cataract extraction  History of tonsillectomy  History of rhinoplasty  History of hernia repair        Pertinent labs:                      9.8    14.4  )-----------( 150      ( 21 Mar 2019 07:03 )             29.2   03-21    136  |  104  |  41<H>  ----------------------------<  183<H>  3.6   |  17<L>  |  1.32<H>    Ca    7.9<L>      21 Mar 2019 06:52  Phos  2.1     03-21  Mg     2.0     03-21    TPro  5.4<L>  /  Alb  3.3  /  TBili  0.5  /  DBili  x   /  AST  66<H>  /  ALT  97<H>  /  AlkPhos  112  03-21  PT/INR - ( 21 Mar 2019 07:03 )   PT: 12.0 sec;   INR: 1.04 ratio         PTT - ( 21 Mar 2019 07:03 )  PTT:27.2 sec    Consent: Procedure/risks/ Benefits explained. Informed consent obtained. Pt verbalizes understanding.

## 2019-03-21 NOTE — PROGRESS NOTE ADULT - SUBJECTIVE AND OBJECTIVE BOX
Alejandro Alicia, PGY1  Pager [phone]    INCOMPLETE NOTE - IN PROGRESS    Patient is a 73y old  Female who presents with a chief complaint of hematemesis (20 Mar 2019 13:14)      SUBJECTIVE/INTERVAL EVENTS: Patient seen and examined at bedside. SAM o/n.    MEDICATIONS  (STANDING):  atorvastatin 20 milliGRAM(s) Oral at bedtime  cefTRIAXone   IVPB 1 Gram(s) IV Intermittent every 24 hours  cefTRIAXone   IVPB      dextrose 5% + sodium chloride 0.9%. 1000 milliLiter(s) (100 mL/Hr) IV Continuous <Continuous>  pantoprazole  Injectable 40 milliGRAM(s) IV Push two times a day    MEDICATIONS  (PRN):  HYDROmorphone  Injectable 0.25 milliGRAM(s) IV Push every 4 hours PRN mild to moderate pain (1-6)  HYDROmorphone  Injectable 0.5 milliGRAM(s) IV Push every 4 hours PRN Severe Pain (7 - 10)      VITAL SIGNS:  T(F): 97.5 (19 @ 04:45), Max: 97.9 (19 @ 15:25)  HR: 81 (19 @ 04:45) (69 - 84)  BP: 130/74 (19 @ 04:45) (102/72 - 130/74)  RR: 18 (19 @ 04:45) (17 - 18)  SpO2: 95% (19 @ 04:45) (95% - 98%)    I&O's Summary    19 Mar 2019 07:  -  20 Mar 2019 07:00  --------------------------------------------------------  IN: 2952.1 mL / OUT: 435 mL / NET: 2517.1 mL    20 Mar 2019 07:  -  21 Mar 2019 06:24  --------------------------------------------------------  IN: 1360 mL / OUT: 650 mL / NET: 710 mL      Daily     Daily Weight in k (20 Mar 2019 17:00)    PHYSICAL EXAM:  Gen: Alert, well-developed, NAD  HEENT: NCAT, PERRL, EOMI, conjunctiva clear, sclera anicteric, no erythema or exudates in the oropharynx, mmm  Neck: Supple, no JVD  CV: RRR, S1S2, no m/r/g  Resp: CTAB, normal respiratory effort  Abd: Soft, nontender, nondistended, normal bowel sounds  Ext: + peripheral pulses, no edema, clubbing, or cyanosis  Neuro: AOx3, no focal deficits  SKIN: No rashes or lesions    LABS:                        8.6    14.0  )-----------( 111      ( 20 Mar 2019 00:24 )             24.8     Hgb Trend: 8.6<--, 9.8<--, 10.1<--, 11.0<--, 10.8<--  03-20    133<L>  |  99  |  54<H>  ----------------------------<  191<H>  3.7   |  20<L>  |  1.72<H>    Ca    8.0<L>      20 Mar 2019 00:24  Phos  3.0     03-20  Mg     2.0     03-20    TPro  5.3<L>  /  Alb  3.7  /  TBili  0.7  /  DBili  x   /  AST  93<H>  /  ALT  123<H>  /  AlkPhos  87  03-20    Creatinine Trend: 1.72<--, 2.12<--, 2.21<--, 2.23<--, 2.03<--, 1.76<--  LIVER FUNCTIONS - ( 20 Mar 2019 00:24 )  Alb: 3.7 g/dL / Pro: 5.3 g/dL / ALK PHOS: 87 U/L / ALT: 123 U/L / AST: 93 U/L / GGT: x           PT/INR - ( 20 Mar 2019 00:24 )   PT: 13.5 sec;   INR: 1.18 ratio         PTT - ( 20 Mar 2019 00:24 )  PTT:29.3 sec        ABG - ( 20 Mar 2019 02:15 )  pH, Arterial: 7.44  pH, Blood: x     /  pCO2: 32    /  pO2: 62    / HCO3: 22    / Base Excess: -1.7  /  SaO2: 92                CAPILLARY BLOOD GLUCOSE      POCT Blood Glucose.: 166 mg/dL (20 Mar 2019 06:43)      RADIOLOGY & ADDITIONAL TESTS: Reviewed    Imaging Personally Reviewed:    Consultant(s) Notes Reviewed:      Care Discussed with Consultants/Other Providers: Alejandro Vargas, PGY1  Pager [phone]      Patient is a 73y old  Female who presents with a chief complaint of hematemesis (20 Mar 2019 13:14)      SUBJECTIVE/INTERVAL EVENTS: Patient seen and examined at bedside. Overnight, was found to be retaining on bladder scan, cooper was replaced. AOx1 to self this AM, denies symptoms.    MEDICATIONS  (STANDING):  atorvastatin 20 milliGRAM(s) Oral at bedtime  cefTRIAXone   IVPB 1 Gram(s) IV Intermittent every 24 hours  cefTRIAXone   IVPB      dextrose 5% + sodium chloride 0.9%. 1000 milliLiter(s) (100 mL/Hr) IV Continuous <Continuous>  pantoprazole  Injectable 40 milliGRAM(s) IV Push two times a day    MEDICATIONS  (PRN):  HYDROmorphone  Injectable 0.25 milliGRAM(s) IV Push every 4 hours PRN mild to moderate pain (1-6)  HYDROmorphone  Injectable 0.5 milliGRAM(s) IV Push every 4 hours PRN Severe Pain (7 - 10)      VITAL SIGNS:  T(F): 97.5 (19 @ 04:45), Max: 97.9 (19 @ 15:25)  HR: 81 (19 @ 04:45) (69 - 84)  BP: 130/74 (19 @ 04:45) (102/72 - 130/74)  RR: 18 (19 @ 04:45) (17 - 18)  SpO2: 95% (19 @ 04:45) (95% - 98%)    I&O's Summary    19 Mar 2019 07:  -  20 Mar 2019 07:00  --------------------------------------------------------  IN: 2952.1 mL / OUT: 435 mL / NET: 2517.1 mL    20 Mar 2019 07:  -  21 Mar 2019 06:24  --------------------------------------------------------  IN: 1360 mL / OUT: 650 mL / NET: 710 mL      Daily     Daily Weight in k (20 Mar 2019 17:00)    PHYSICAL EXAM:  Gen: Alert, NAD  HEENT: NCAT, PERRL, EOMI, conjunctiva clear, sclera anicteric  Neck: Supple, no JVD  CV: RRR, S1S2, no m/r/g  Resp: faint basilar crackles, normal respiratory effort  Abd: Soft, nontender, stable mild abd distention, normal bowel sounds  Ext: + peripheral pulses, no edema, clubbing, or cyanosis  Neuro: AOx1, intermittently verbalizes one word responses, follows commands  SKIN: stable forearm ecchymoses    LABS:                        8.6    14.0  )-----------( 111      ( 20 Mar 2019 00:24 )             24.8     Hgb Trend: 8.6<--, 9.8<--, 10.1<--, 11.0<--, 10.8<--  03-20    133<L>  |  99  |  54<H>  ----------------------------<  191<H>  3.7   |  20<L>  |  1.72<H>    Ca    8.0<L>      20 Mar 2019 00:24  Phos  3.0     03-20  Mg     2.0     03-20    TPro  5.3<L>  /  Alb  3.7  /  TBili  0.7  /  DBili  x   /  AST  93<H>  /  ALT  123<H>  /  AlkPhos  87  03-20    Creatinine Trend: 1.72<--, 2.12<--, 2.21<--, 2.23<--, 2.03<--, 1.76<--  LIVER FUNCTIONS - ( 20 Mar 2019 00:24 )  Alb: 3.7 g/dL / Pro: 5.3 g/dL / ALK PHOS: 87 U/L / ALT: 123 U/L / AST: 93 U/L / GGT: x           PT/INR - ( 20 Mar 2019 00:24 )   PT: 13.5 sec;   INR: 1.18 ratio         PTT - ( 20 Mar 2019 00:24 )  PTT:29.3 sec        ABG - ( 20 Mar 2019 02:15 )  pH, Arterial: 7.44  pH, Blood: x     /  pCO2: 32    /  pO2: 62    / HCO3: 22    / Base Excess: -1.7  /  SaO2: 92                CAPILLARY BLOOD GLUCOSE      POCT Blood Glucose.: 166 mg/dL (20 Mar 2019 06:43)      RADIOLOGY & ADDITIONAL TESTS: Reviewed    Imaging Personally Reviewed:    Consultant(s) Notes Reviewed:      Care Discussed with Consultants/Other Providers:

## 2019-03-21 NOTE — PROGRESS NOTE ADULT - ASSESSMENT
Impression: Expressive Aphasia w/ right hemiparesis (arm, face) likely 2/2 left frontal subacute ischemic infarct (r/o mass lesion) of unknown etiology and mechanism. However given new acute multi vascular territorial infarcts high likelihood of ESUS. DDx includes cardio embolism, hypercoagulability of malignancy (hx of b/l DVT, suspicious gastric mass) or metastasis.     Given probable cause of stroke being malignancy; pt would need therapeutic AC to prevent more strokes. IF medical/bleeding contraindication; then at the very least should be on ASA 81 for secondary stroke prevention.    [] ASA 81 if not AC. If considering full AC, ideally Lovenox SQ  [] obtain MRA Head w/o; MRA neck w/ to complete stroke workup; assess cerebral vasculature  [] TTE should be repeated WITH Bubble study (mention in comment section of order) to assess for PFO; devin given DVT/PE hx.  [x] MRI -multiple vascular territory infarcts cardioembolic vs. metastatic  [x] A1c 5.6, LDL 26  [] Palliative consult 73 years old woman with multiple vascular risk factors was admitted to [facility] for poor appetite and generalized weakness of few days duration. She was diagnosed to have pulmonary embolism and was started on therapeutic integration, which was complicated by hematemesis. On further evaluation she was diagnosed to have ulcerative gastric mass concerning for malignancy with metastases to the liver. MRI brain showed acute/subacute left MCA distribution infarct as well as multiple punctate infarcts in multiple vascular distributions. CT chest, abdomen and pelvis showed multifocal hepatic metastasis, peritoneal carcinomatosis and large amount of ascites as well as nonocclusive thrombus in the infrarenal IVC and bilateral lower extremities. TTE did not show any obvious structural correct source of embolism.    Impression:  Cerebral embolism with cerebral infarction. Left MCA distribution stroke and punctate infarcts in multiple vascular distributions - likely etiology being embolic stroke in the setting of secondary hypercoagulable state attributed by probable metastatic malignancy    Plan:  - Would optimally benefit from starting therapeutic anticoagulation-prefer subcutaneous Lovenox-for secondary stroke prevention in this patient with concerns for secondary hypercoagulable state due to probable metastatic malignancy. Due to her recent history of hematemesis and gastric mass, consider starting cautious therapeutic anticoagulation with heparin drip without boluses and PTT goal being 50-60 once not contraindicated from medical/gastroenterologist standpoint. Risks versus benefits and adverse reaction/complications associated with therapeutic anticoagulation use including hemorrhagic transformation of ischemic infarct were discussed with available family members at bedside in detail.  - Recommend starting aspirin if therapeutic anticoagulation is absolutely contraindicated from medical/gastroenterologist standpoint  - Consider starting pharmacological DVT prophylaxis   - Continue with home statin medication if applicable   - HbA1C 5.6 and LDL 21, continue with aggressive vascular risk factors modifications   - BP goal - gradual normotension   - Recommend vessel imaging with MRA versus CTA head and neck to evaluate for intracranial and extracranial cerebral vasculature  - TTE with bubble study and continue with telemetry   - PT/OT/Speech and swallow/safety eval  - Stroke education  - Agree to discuss long-term goals of care with family members     Above mentioned plan was discussed with patient and available family member in detail. All the questions were answered and concerns were addressed.

## 2019-03-21 NOTE — PROCEDURE NOTE - NSINFORMCONSENT_GEN_A_CORE
Benefits, risks, and possible complications of procedure explained to patient/caregiver who verbalized understanding and gave written consent./from daughter

## 2019-03-21 NOTE — PROGRESS NOTE ADULT - PROBLEM SELECTOR PLAN 5
CTA showing innumerable hypodense liver nodules and peritoneal nodularities concerning for metastatic disease and peritoneal carcinomatosis  - elevated , CA 19-9, AFP suspicious for GI malignancy  - possibly gastric primary, f/u path  - will hold off on CTAP until can tolerate IV contrast CTA showing innumerable hypodense liver nodules and peritoneal nodularities concerning for metastatic disease and peritoneal carcinomatosis  - elevated , CA 19-9, AFP suspicious for GI malignancy  - likely 2/2 gastric adenocarcinoma seen on path  - pending CTAP to eval cancer burden

## 2019-03-21 NOTE — CONSULT NOTE ADULT - SUBJECTIVE AND OBJECTIVE BOX
HPI:  73 F w/ pmh former smoker HTN, HLD, RA (colchicine), peripheral neuropathy who presents for decreased appetite, generalized weakness and confusion for 3 days. Patient is a clinic patient at [address] and follow up with Dr. Lema in [facility]. Patient had a colonoscopy in dec/2018. Patient also recently returned from [place] 2/18/19. Patient is denied any chest pain, abdominal pain, sob, fever.  Patient is AxO2, no complaints. She has a history of hepatitis A, B and C were negative.     In the ED she had hematemesis and was intubated for airway protection. Transferred to the MICU for further care and management. (16 Mar 2019 19:20)    PERTINENT PM/SXH:   Raynauds disease  GERD (gastroesophageal reflux disease)  HLD (hyperlipidemia)  HTN (hypertension)  COPD (chronic obstructive pulmonary disease)    S/P left cataract extraction  History of tonsillectomy  History of rhinoplasty  History of hernia repair    FAMILY HISTORY:  No pertinent family history in first degree relatives    ITEMS NOT CHECKED ARE NOT PRESENT    SOCIAL HISTORY:   Significant other/partner:  [x ]  Children x 2 Aniya and edward  [ ]  Tenriism/Spirituality: Caodaism  Substance hx:  [ ]   Tobacco hx:  [ ]   Alcohol hx: [ ]   Home Opioid hx:  [ ] I-Stop Reference No:  Living Situation: [x ]Home with dtr [ ]Long term care  [ ]Rehab [ ]Other    ADVANCE DIRECTIVES:    DNR  Yes  MOLST  [ ]  Living Will  [ ]     DECISION MAKER(s):  [ ] Health Care Proxy(s)  [ x] Surrogate(s)  [ ] Guardian           Name(s): Phone Number(s): Aniya [phone]    BASELINE (I)ADL(s) (prior to admission):  [place]: [x ]Total  [ ] Moderate [ ]Dependent    Allergies  No Known Allergies    Intolerances    MEDICATIONS  (STANDING):  atorvastatin 20 milliGRAM(s) Oral at bedtime  cefTRIAXone   IVPB 1 Gram(s) IV Intermittent every 24 hours  cefTRIAXone   IVPB      dextrose 5% + sodium chloride 0.9%. 1000 milliLiter(s) (75 mL/Hr) IV Continuous <Continuous>  pantoprazole  Injectable 40 milliGRAM(s) IV Push two times a day  potassium acid phosphate/sodium acid phosphate tablet (K-PHOS No. 2) 1 Tablet(s) Oral four times a day with meals    MEDICATIONS  (PRN):  HYDROmorphone  Injectable 0.25 milliGRAM(s) IV Push every 4 hours PRN mild to moderate pain (1-6)  HYDROmorphone  Injectable 0.5 milliGRAM(s) IV Push every 4 hours PRN Severe Pain (7 - 10)    PRESENT SYMPTOMS: [ ]Unable to obtain due to poor mentation   Source if other than patient:  [x]Family   [x]Team     Pain (Impact on QOL):    Location -         Minimal acceptable level (0-10 scale):                    Aggravating factors -  Quality -  Radiation -  Severity (0-10 scale) -    Timing -    PAIN AD Score: 0    [email or web address]/[email or web address] (press ctrl +  left click to view)    Dyspnea:                           [ ]Mild [ ]Moderate [ ]Severe  Anxiety:                             [ ]Mild [ ]Moderate [ ]Severe  Fatigue:                             [x ]Mild [ ]Moderate [ ]Severe  Nausea:                             [ ]Mild [ ]Moderate [ ]Severe  Loss of appetite:              [ ]Mild [x ]Moderate [ ]Severe  Constipation:                    [ ]Mild [ ]Moderate [ ]Severe    Other Symptoms:  [ ]All other review of systems negative     Karnofsky Performance Score/Palliative Performance Status Version 2:      30   %    http://palliative.info/resource_material/[email or web address].pdf    PHYSICAL EXAM:  Vital Signs Last 24 Hrs  T(C): 36.6 (21 Mar 2019 12:15), Max: 36.6 (20 Mar 2019 15:25)  T(F): 97.8 (21 Mar 2019 12:15), Max: 97.9 (20 Mar 2019 15:25)  HR: 88 (21 Mar 2019 12:55) (69 - 94)  BP: 117/81 (21 Mar 2019 12:15) (102/72 - 130/74)  BP(mean): --  RR: 23 (21 Mar 2019 12:55) (18 - 23)  SpO2: 93% (21 Mar 2019 12:55) (93% - 98%) I&O's Summary    20 Mar 2019 07:01  -  21 Mar 2019 07:00  --------------------------------------------------------  IN: 2560 mL / OUT: 750 mL / NET: 1810 mL    21 Mar 2019 07:01  -  21 Mar 2019 15:24  --------------------------------------------------------  IN: 240 mL / OUT: 50 mL / NET: 190 mL    GENERAL:  [x ]Alert  [x ]Oriented x 1  [x]Lethargic  [ ]Cachexia  [ ]Unarousable  [ ]Verbal  [x ]Non-Verbal aphasic    Behavioral:   [ ] Anxiety  [ ] Delirium [ ] Agitation [ ] Other    HEENT:  [ ]Normal   [x ]Dry mouth   [ ]ET Tube/Trach  [ ]Oral lesions    PULMONARY:   [x ]Clear [ ]Tachypnea  [ ]Audible excessive secretions   [ ]Rhonchi        [ ]Right [ ]Left [ ]Bilateral  [ ]Crackles        [ ]Right [ ]Left [ ]Bilateral  [ ]Wheezing     [ ]Right [ ]Left [ ]Bilateral    CARDIOVASCULAR:    [x ]Regular [ ]Irregular [ ]Tachy  [ ]Allan [ ]Murmur [ ]Other    GASTROINTESTINAL:  [x ]Soft  [ ]Distended   [x ]+BS  [x ]Non tender [ ]Tender  [ ]PEG [ ]OGT/ NGT  Last BM:     GENITOURINARY:  [ ]Normal [x ] Incontinent   [ ]Oliguria/Anuria   [ ][place]    MUSCULOSKELETAL:   [ ]Normal   [ x]Weakness  [ x]Bed/Wheelchair bound [ ]Edema    NEUROLOGIC:   [ ]No focal deficits  [ ] Cognitive impairment  [ ] Dysphagia [ ]Dysarthria [ ] Paresis [x ]Other aphasic     SKIN:   [ ]Normal   [ ]Pressure ulcer(s)  [ ]Rasoh  [x]ecchymosis    CRITICAL CARE:  [ ] Shock Present  [ ] Septic [ ]Cardiogenic [ ]Neurologic [ ]Hypovolemic  [ ]  Vasopressors [ ]  Inotropes   [ ] Respiratory failure present  [ ] Acute  [ ] Chronic [ ] Hypoxic  [ ] Hypercarbic [ ] Other  [ ] Other organ failure     LABS:                        9.8    14.4  )-----------( 150      ( 21 Mar 2019 07:03 )             29.2   03-21    136  |  104  |  41<H>  ----------------------------<  183<H>  3.6   |  17<L>  |  1.32<H>    Ca    7.9<L>      21 Mar 2019 06:52  Phos  2.1     03-21  Mg     2.0     03-21    TPro  5.4<L>  /  Alb  3.3  /  TBili  0.5  /  DBili  x   /  AST  66<H>  /  ALT  97<H>  /  AlkPhos  112  03-21  PT/INR - ( 21 Mar 2019 07:03 )   PT: 12.0 sec;   INR: 1.04 ratio         PTT - ( 21 Mar 2019 07:03 )  PTT:27.2 sec      RADIOLOGY & ADDITIONAL STUDIES:    PROTEIN CALORIE MALNUTRITION PRESENT: [ ] Yes [ ] No  [ x] PPSV2 < or = to 30% [ ] significant weight loss  [x ] poor nutritional intake [ ] catabolic state [ ] anasarca     Albumin, Serum: 3.3 g/dL (03-21-19 @ 06:52)  Artificial Nutrition [ ]     REFERRALS:   [ ]Chaplaincy  [ ] Hospice  [ ]Child Life  [ ]Social Work  [ ]Case management [ ]Holistic Therapy   Goals of Care Discussion Document:

## 2019-03-21 NOTE — PROGRESS NOTE ADULT - PROBLEM SELECTOR PLAN 3
Has been aphasic since extubation. Normal speech at baseline although she was less interested in talking over the past several days PTA.  - presumably 2/2 CVA, will monitor  - Follows commands and appears to have understanding Has been largely aphasic since extubation. Normal speech at baseline although she was less interested in talking over the past several days PTA.  - presumably 2/2 CVA, will monitor  - Follows commands and appears to have understanding, has been more interactive with family and gives simple one word responses

## 2019-03-21 NOTE — CONSULT NOTE ADULT - ASSESSMENT
73 F w/ pmh former smoker HTN, HLD, RA, peripheral neuropathy who presents for decreased appetite, generalized weakness and confusion. pt developed  hematemesis and was intubated for airway protection in ED Transferred to the MICU for further care and management. Found to have newly diagnosis adenocarcinoma via EGD large ulcerated mass c/b aphasia 2/2 to infarcts seen on MRI.  Pt to have CT of abd and pelvis today. Palliative called for GOC.

## 2019-03-21 NOTE — CONSULT NOTE ADULT - PROBLEM SELECTOR RECOMMENDATION 4
spoke with dtr Aniya at pts bedside.  answered questions regarding her moms new diagnosis. reviewed her moms decision to not have compressions and intubation.  Pt was a  and raised her 2 children alone.  Her  was a physican and  young.  Her son lives in [place] and is here visiting but speaks minimal english, moslty Bhutanese.  Pt has lived with her dtr for 16 years. Surrogates would be pts dtr and son.  Pt is schedule to go for a CT scan today.  I will follow up with pt and dtr tomorrow.

## 2019-03-21 NOTE — PROGRESS NOTE ADULT - PROBLEM SELECTOR PLAN 6
CTA showing cirrhotic liver and mod volume ascites. Ammonia 22.  - unclear etiology, ?KIM  - no varices on EGD  - mod ascites, will hold off on tapping given bleeding risk  - c/w empiric CTX given hematemesis in setting of cirrhosis  - thrombocytopenia likely 2/2 cirrhosis CTA showing cirrhotic liver and mod volume ascites. Ammonia 22.  - unclear etiology, ?KIM  - no varices on EGD  - mod ascites, pending diagnostic and therapeutic paracentesis today by IR  - c/w empiric CTX given recent hematemesis in setting of cirrhosis  - thrombocytopenia likely 2/2 cirrhosis

## 2019-03-21 NOTE — PROGRESS NOTE ADULT - SUBJECTIVE AND OBJECTIVE BOX
*************************************  STROKE NEUROLOGY PROGRESS NOTE  **************************************    TAYA DONNELLY  Female  [id number]    Subjective: Pt seen with stroke team at bedside; daughter present and insisting on translating. Awake but appears lethargic. Overnight urinary retention but otherwise no acute events reported.     VITAL SIGNS:  Vital Signs Last 24 Hrs  T(C): 36.6 (21 Mar 2019 12:15), Max: 36.6 (20 Mar 2019 15:25)  T(F): 97.8 (21 Mar 2019 12:15), Max: 97.9 (20 Mar 2019 15:25)  HR: 88 (21 Mar 2019 12:55) (69 - 94)  BP: 117/81 (21 Mar 2019 12:15) (102/72 - 130/74)  BP(mean): --  RR: 23 (21 Mar 2019 12:55) (18 - 23)  SpO2: 93% (21 Mar 2019 12:55) (93% - 98%)    PHYSICAL EXAMINATION:  General: appears cachectic; bitemporal wasting; R forearm purple heme SQ  Neurological Exam:  Mental Status: AAOx1, hypophonia, does not name objects, does not attempt to repeat sentences ([place] speaking) , follows simple commands, difficulty w/ complex commands (FTN, left/right confusion)  Cranial Nerves: EOMI, PERRL, V1-V3 intact, mild right facial fold flattening, no dysarthria but hypophonia (s/p extubation), tongue midline, VFF  Motor: antigravity (effort limited) throughout. + Drift RUE.  Sensation: Intact to LT throughout  Coordination: effort limited FTN on the right (likely due to weakness) no dysmetria w/ left FTN.   Reflexes: toes upgoing b/l (more so on the right)   Gait: not assessed      LABS:                          9.8    14.4  )-----------( 150      ( 21 Mar 2019 07:03 )             29.2     03-21    136  |  104  |  41<H>  ----------------------------<  183<H>  3.6   |  17<L>  |  1.32<H>    Ca    7.9<L>      21 Mar 2019 06:52  Phos  2.1     03-21  Mg     2.0     03-21    TPro  5.4<L>  /  Alb  3.3  /  TBili  0.5  /  DBili  x   /  AST  66<H>  /  ALT  97<H>  /  AlkPhos  112  03-21    PT/INR - ( 21 Mar 2019 07:03 )   PT: 12.0 sec;   INR: 1.04 ratio         PTT - ( 21 Mar 2019 07:03 )  PTT:27.2 sec    RADIOLOGY & ADDITIONAL STUDIES:    < from: MR Head No Cont (03.20.19 @ 09:22) >    Impression:    Probable subacute left frontal infarct.  Multiple bilateral hemispheric and probable right cerebellar punctate   acute lacunar type infarcts. Findings suggest embolic phenomenon.   Metastatic disease not completely excluded. Follow-up with contrast can   be obtained when clinically feasible.    The results of this examination were discussed with Dr. Cheney at 9:45 AM   on 3/20/2019.    < end of copied text > *************************************  STROKE NEUROLOGY PROGRESS NOTE  **************************************    TAYA DONNELLY  Female  MRN-[id number]    Subjective: Pt seen with stroke team at bedside; daughter present and insisting on translating. Awake but appears lethargic. Overnight urinary retention but otherwise no acute events reported.     ROS: All negative except documented above and grossly obtained by chart review and information from the family members at bedside.    VITAL SIGNS:  Vital Signs Last 24 Hrs  T(C): 36.6 (21 Mar 2019 12:15), Max: 36.6 (20 Mar 2019 15:25)  T(F): 97.8 (21 Mar 2019 12:15), Max: 97.9 (20 Mar 2019 15:25)  HR: 88 (21 Mar 2019 12:55) (69 - 94)  BP: 117/81 (21 Mar 2019 12:15) (102/72 - 130/74)  BP(mean): --  RR: 23 (21 Mar 2019 12:55) (18 - 23)  SpO2: 93% (21 Mar 2019 12:55) (93% - 98%)    PHYSICAL EXAMINATION:  General: appears cachectic; bitemporal wasting; R forearm purple heme SQ  Neurological Exam:  Mental Status: drowsy but arousable, oriented to herself, hypophonic, does not name objects, does not attempt to repeat sentences ([place] speaking), follows some simple commands, difficulty w/ complex commands  Cranial Nerves: EOMI, PERRL, V1-V3 intact, mild right facial fold flattening, no dysarthria but hypophonia (s/p extubation), tongue midline, VFF  Motor: antigravity (effort limited) throughout. + Drift RUE.  Sensation: Intact to LT throughout  Coordination: effort limited FTN on the right (likely due to weakness) no dysmetria w/ left FTN.   Reflexes: toes upgoing b/l (more so on the right)   Gait: not assessed        LABS:                          9.8    14.4  )-----------( 150      ( 21 Mar 2019 07:03 )             29.2     03-21    136  |  104  |  41<H>  ----------------------------<  183<H>  3.6   |  17<L>  |  1.32<H>    Ca    7.9<L>      21 Mar 2019 06:52  Phos  2.1     03-21  Mg     2.0     03-21    TPro  5.4<L>  /  Alb  3.3  /  TBili  0.5  /  DBili  x   /  AST  66<H>  /  ALT  97<H>  /  AlkPhos  112  03-21    PT/INR - ( 21 Mar 2019 07:03 )   PT: 12.0 sec;   INR: 1.04 ratio         PTT - ( 21 Mar 2019 07:03 )  PTT:27.2 sec    RADIOLOGY & ADDITIONAL STUDIES:    < from: MR Head No Cont (03.20.19 @ 09:22) >    Impression:    Probable subacute left frontal infarct.  Multiple bilateral hemispheric and probable right cerebellar punctate   acute lacunar type infarcts. Findings suggest embolic phenomenon.   Metastatic disease not completely excluded. Follow-up with contrast can   be obtained when clinically feasible.    The results of this examination were discussed with Dr. Cheney at 9:45 AM   on 3/20/2019.    < end of copied text >

## 2019-03-21 NOTE — CHART NOTE - NSCHARTNOTEFT_GEN_A_CORE
PRE-INTERVENTIONAL RADIOLOGY PROCEDURE NOTE      Patient Age: 73    Patient Gender: F    Procedure: Diagnostic and therapeutic paracentesis    Diagnosis/Indication: Cirrhosis with ascites and abdominal discomfort    Interventional Radiology Attending Physician: Génesis Duke    Ordering Attending Physician: Dr. Leticia Garcia    Pertinent Medical History: AMS, new diagnosis of gastric adenocarcinoma with appearance of liver mets and peritoneal carcinomatosis with ascites.    Pertinent labs:                      8.6    14.0  )-----------( 111      ( 20 Mar 2019 00:24 )             24.8       03-21    136  |  104  |  41<H>  ----------------------------<  183<H>  3.6   |  17<L>  |  1.32<H>    Ca    7.9<L>      21 Mar 2019 06:52  Phos  2.1     03-21  Mg     2.0     03-21    TPro  5.4<L>  /  Alb  3.3  /  TBili  0.5  /  DBili  x   /  AST  66<H>  /  ALT  97<H>  /  AlkPhos  112  03-21      PT/INR - ( 21 Mar 2019 07:03 )   PT: 12.0 sec;   INR: 1.04 ratio         PTT - ( 21 Mar 2019 07:03 )  PTT:27.2 sec        Patient and Family Aware ? Yes

## 2019-03-21 NOTE — PROGRESS NOTE ADULT - PROBLEM SELECTOR PLAN 1
Likely 2/2 ulcerated gastric mass on EGD. Normal esophagus and duodenum.  - s/p intubation and extubation 3/18 for airway protection  - No signs of active bleeding  - Monitor CBC, transfuse for Hgb <7

## 2019-03-21 NOTE — PROVIDER CONTACT NOTE (OTHER) - SITUATION
Pt noted with tachypnea and lethargy, As per daughter pt seems to be more lethargic today than yesterday. Pt also refusing PO intake.

## 2019-03-21 NOTE — PROGRESS NOTE ADULT - PROBLEM SELECTOR PLAN 7
Likely 2/2 hypoperfusion in setting of GI bleed and decreased PO intake (specific gravity >1.05) vs contrast-induced nephropathy.  - c/w D5NS@100cc and oral hydration Likely 2/2 hypoperfusion in setting of GI bleed and decreased PO intake (specific gravity >1.05) vs contrast-induced nephropathy.  - sCr downtrending with hydration  - c/w D5NS@100cc and oral hydration

## 2019-03-22 DIAGNOSIS — C79.9 SECONDARY MALIGNANT NEOPLASM OF UNSPECIFIED SITE: ICD-10-CM

## 2019-03-22 LAB
ALBUMIN SERPL ELPH-MCNC: 2.7 G/DL — LOW (ref 3.3–5)
ALP SERPL-CCNC: 123 U/L — HIGH (ref 40–120)
ALT FLD-CCNC: 74 U/L — HIGH (ref 10–45)
ANION GAP SERPL CALC-SCNC: 13 MMOL/L — SIGNIFICANT CHANGE UP (ref 5–17)
AST SERPL-CCNC: 45 U/L — HIGH (ref 10–40)
BASOPHILS # BLD AUTO: 0 K/UL — SIGNIFICANT CHANGE UP (ref 0–0.2)
BILIRUB SERPL-MCNC: 0.4 MG/DL — SIGNIFICANT CHANGE UP (ref 0.2–1.2)
BUN SERPL-MCNC: 34 MG/DL — HIGH (ref 7–23)
CALCIUM SERPL-MCNC: 7.7 MG/DL — LOW (ref 8.4–10.5)
CHLORIDE SERPL-SCNC: 108 MMOL/L — SIGNIFICANT CHANGE UP (ref 96–108)
CO2 SERPL-SCNC: 18 MMOL/L — LOW (ref 22–31)
CREAT SERPL-MCNC: 1.02 MG/DL — SIGNIFICANT CHANGE UP (ref 0.5–1.3)
EOSINOPHIL # BLD AUTO: 0.2 K/UL — SIGNIFICANT CHANGE UP (ref 0–0.5)
EOSINOPHIL NFR BLD AUTO: 1 % — SIGNIFICANT CHANGE UP (ref 0–6)
GLUCOSE SERPL-MCNC: 161 MG/DL — HIGH (ref 70–99)
GRAM STN FLD: SIGNIFICANT CHANGE UP
HCT VFR BLD CALC: 31.3 % — LOW (ref 34.5–45)
HGB BLD-MCNC: 10.4 G/DL — LOW (ref 11.5–15.5)
LYMPHOCYTES # BLD AUTO: 0.6 K/UL — LOW (ref 1–3.3)
LYMPHOCYTES # BLD AUTO: 2 % — LOW (ref 13–44)
MAGNESIUM SERPL-MCNC: 2 MG/DL — SIGNIFICANT CHANGE UP (ref 1.6–2.6)
MCHC RBC-ENTMCNC: 30.7 PG — SIGNIFICANT CHANGE UP (ref 27–34)
MCHC RBC-ENTMCNC: 33.1 GM/DL — SIGNIFICANT CHANGE UP (ref 32–36)
MCV RBC AUTO: 92.7 FL — SIGNIFICANT CHANGE UP (ref 80–100)
MONOCYTES # BLD AUTO: 1.4 K/UL — HIGH (ref 0–0.9)
MONOCYTES NFR BLD AUTO: 10 % — SIGNIFICANT CHANGE UP (ref 2–14)
NEUTROPHILS # BLD AUTO: 14.1 K/UL — HIGH (ref 1.8–7.4)
NEUTROPHILS NFR BLD AUTO: 85 % — HIGH (ref 43–77)
NIGHT BLUE STAIN TISS: SIGNIFICANT CHANGE UP
PHOSPHATE SERPL-MCNC: 1.9 MG/DL — LOW (ref 2.5–4.5)
PLATELET # BLD AUTO: 159 K/UL — SIGNIFICANT CHANGE UP (ref 150–400)
POTASSIUM SERPL-MCNC: 4.1 MMOL/L — SIGNIFICANT CHANGE UP (ref 3.5–5.3)
POTASSIUM SERPL-SCNC: 4.1 MMOL/L — SIGNIFICANT CHANGE UP (ref 3.5–5.3)
PROT SERPL-MCNC: 4.6 G/DL — LOW (ref 6–8.3)
RBC # BLD: 3.38 M/UL — LOW (ref 3.8–5.2)
RBC # FLD: 14.3 % — SIGNIFICANT CHANGE UP (ref 10.3–14.5)
SODIUM SERPL-SCNC: 139 MMOL/L — SIGNIFICANT CHANGE UP (ref 135–145)
SPECIMEN SOURCE: SIGNIFICANT CHANGE UP
SPECIMEN SOURCE: SIGNIFICANT CHANGE UP
WBC # BLD: 16.3 K/UL — HIGH (ref 3.8–10.5)
WBC # FLD AUTO: 16.3 K/UL — HIGH (ref 3.8–10.5)

## 2019-03-22 PROCEDURE — 99233 SBSQ HOSP IP/OBS HIGH 50: CPT | Mod: GC

## 2019-03-22 RX ORDER — CEFTRIAXONE 500 MG/1
1 INJECTION, POWDER, FOR SOLUTION INTRAMUSCULAR; INTRAVENOUS EVERY 24 HOURS
Qty: 0 | Refills: 0 | Status: COMPLETED | OUTPATIENT
Start: 2019-03-23 | End: 2019-03-24

## 2019-03-22 RX ORDER — SODIUM CHLORIDE 9 MG/ML
1000 INJECTION, SOLUTION INTRAVENOUS
Qty: 0 | Refills: 0 | Status: DISCONTINUED | OUTPATIENT
Start: 2019-03-22 | End: 2019-03-24

## 2019-03-22 RX ADMIN — CEFTRIAXONE 100 GRAM(S): 500 INJECTION, POWDER, FOR SOLUTION INTRAMUSCULAR; INTRAVENOUS at 11:37

## 2019-03-22 RX ADMIN — HYDROMORPHONE HYDROCHLORIDE 0.5 MILLIGRAM(S): 2 INJECTION INTRAMUSCULAR; INTRAVENOUS; SUBCUTANEOUS at 20:33

## 2019-03-22 RX ADMIN — SODIUM CHLORIDE 75 MILLILITER(S): 9 INJECTION, SOLUTION INTRAVENOUS at 06:08

## 2019-03-22 RX ADMIN — HYDROMORPHONE HYDROCHLORIDE 0.25 MILLIGRAM(S): 2 INJECTION INTRAMUSCULAR; INTRAVENOUS; SUBCUTANEOUS at 12:07

## 2019-03-22 RX ADMIN — HYDROMORPHONE HYDROCHLORIDE 0.5 MILLIGRAM(S): 2 INJECTION INTRAMUSCULAR; INTRAVENOUS; SUBCUTANEOUS at 04:31

## 2019-03-22 RX ADMIN — HYDROMORPHONE HYDROCHLORIDE 0.5 MILLIGRAM(S): 2 INJECTION INTRAMUSCULAR; INTRAVENOUS; SUBCUTANEOUS at 05:00

## 2019-03-22 RX ADMIN — PANTOPRAZOLE SODIUM 40 MILLIGRAM(S): 20 TABLET, DELAYED RELEASE ORAL at 18:00

## 2019-03-22 RX ADMIN — Medication 62.5 MILLIMOLE(S): at 11:31

## 2019-03-22 RX ADMIN — HYDROMORPHONE HYDROCHLORIDE 0.5 MILLIGRAM(S): 2 INJECTION INTRAMUSCULAR; INTRAVENOUS; SUBCUTANEOUS at 20:18

## 2019-03-22 RX ADMIN — HYDROMORPHONE HYDROCHLORIDE 0.25 MILLIGRAM(S): 2 INJECTION INTRAMUSCULAR; INTRAVENOUS; SUBCUTANEOUS at 11:37

## 2019-03-22 RX ADMIN — PANTOPRAZOLE SODIUM 40 MILLIGRAM(S): 20 TABLET, DELAYED RELEASE ORAL at 06:08

## 2019-03-22 NOTE — PROGRESS NOTE ADULT - PROBLEM SELECTOR PLAN 8
Asymptomatic off meds  - c/w O2 NC Likely 2/2 hypoperfusion in setting of GI bleed and decreased PO intake (specific gravity >1.05) vs contrast-induced nephropathy.  - resolved s/p IV and oral hydration

## 2019-03-22 NOTE — PROGRESS NOTE ADULT - PROBLEM SELECTOR PLAN 5
CTA showing innumerable hypodense liver nodules and peritoneal nodularities concerning for metastatic disease and peritoneal carcinomatosis  - elevated , CA 19-9, AFP suspicious for GI malignancy  - likely 2/2 gastric adenocarcinoma seen on path  - pending CTAP to eval cancer burden CT showing multifocal liver mets and peritoneal carcinomatosis  - elevated , CA 19-9, AFP suspicious for GI malignancy  - likely 2/2 gastric adenocarcinoma seen on path Has been largely aphasic since extubation. Normal speech at baseline.  - Likely 2/2 CVA  - Follows commands and appears to have understanding, has been more interactive with family and gives some simple one word responses

## 2019-03-22 NOTE — PROGRESS NOTE ADULT - SUBJECTIVE AND OBJECTIVE BOX
Alejandro Alicia, PGY1  Pager [phone]    INCOMPLETE NOTE - IN PROGRESS    Patient is a 73y old  Female who presents with a chief complaint of hematemesis (21 Mar 2019 16:56)      SUBJECTIVE/INTERVAL EVENTS: Patient seen and examined at bedside. SAM o/n.    MEDICATIONS  (STANDING):  atorvastatin 20 milliGRAM(s) Oral at bedtime  cefTRIAXone   IVPB 1 Gram(s) IV Intermittent every 24 hours  cefTRIAXone   IVPB      dextrose 5% + sodium chloride 0.9%. 1000 milliLiter(s) (75 mL/Hr) IV Continuous <Continuous>  pantoprazole  Injectable 40 milliGRAM(s) IV Push two times a day    MEDICATIONS  (PRN):  HYDROmorphone  Injectable 0.25 milliGRAM(s) IV Push every 4 hours PRN mild to moderate pain (1-6)  HYDROmorphone  Injectable 0.5 milliGRAM(s) IV Push every 4 hours PRN Severe Pain (7 - 10)      VITAL SIGNS:  T(F): 97.6 (03-22-19 @ 04:27), Max: 97.8 (03-21-19 @ 12:15)  HR: 75 (03-22-19 @ 04:27) (73 - 94)  BP: 104/63 (03-22-19 @ 04:27) (104/63 - 117/81)  RR: 18 (03-22-19 @ 04:27) (18 - 23)  SpO2: 97% (03-22-19 @ 04:27) (93% - 97%)    I&O's Summary    20 Mar 2019 07:01  -  21 Mar 2019 07:00  --------------------------------------------------------  IN: 2560 mL / OUT: 750 mL / NET: 1810 mL    21 Mar 2019 07:01  -  22 Mar 2019 06:32  --------------------------------------------------------  IN: 1200 mL / OUT: 750 mL / NET: 450 mL      Daily     Daily     PHYSICAL EXAM:  Gen: Alert, well-developed, NAD  HEENT: NCAT, PERRL, EOMI, conjunctiva clear, sclera anicteric, no erythema or exudates in the oropharynx, mmm  Neck: Supple, no JVD  CV: RRR, S1S2, no m/r/g  Resp: CTAB, normal respiratory effort  Abd: Soft, nontender, nondistended, normal bowel sounds  Ext: + peripheral pulses, no edema, clubbing, or cyanosis  Neuro: AOx3, no focal deficits  SKIN: No rashes or lesions    LABS:                        9.8    14.4  )-----------( 150      ( 21 Mar 2019 07:03 )             29.2     Hgb Trend: 9.8<--, 8.6<--, 9.8<--, 10.1<--, 11.0<--  03-21    136  |  104  |  41<H>  ----------------------------<  183<H>  3.6   |  17<L>  |  1.32<H>    Ca    7.9<L>      21 Mar 2019 06:52  Phos  2.1     03-21  Mg     2.0     03-21    TPro  5.4<L>  /  Alb  3.3  /  TBili  0.5  /  DBili  x   /  AST  66<H>  /  ALT  97<H>  /  AlkPhos  112  03-21    Creatinine Trend: 1.32<--, 1.72<--, 2.12<--, 2.21<--, 2.23<--, 2.03<--  LIVER FUNCTIONS - ( 21 Mar 2019 06:52 )  Alb: 3.3 g/dL / Pro: 5.4 g/dL / ALK PHOS: 112 U/L / ALT: 97 U/L / AST: 66 U/L / GGT: x           PT/INR - ( 21 Mar 2019 07:03 )   PT: 12.0 sec;   INR: 1.04 ratio         PTT - ( 21 Mar 2019 07:03 )  PTT:27.2 sec          CAPILLARY BLOOD GLUCOSE          RADIOLOGY & ADDITIONAL TESTS: Reviewed    Imaging Personally Reviewed:    Consultant(s) Notes Reviewed:      Care Discussed with Consultants/Other Providers: Alejandro Vargas, PGY1  Pager [phone]      Patient is a 73y old  Female who presents with a chief complaint of hematemesis (21 Mar 2019 16:56)      SUBJECTIVE/INTERVAL EVENTS: Patient seen and examined at bedside. SAM o/n. AOx1 to self. Follows some basic commands and is unresponsive to other. Denies any symptoms (gestures no with hands and head).    MEDICATIONS  (STANDING):  atorvastatin 20 milliGRAM(s) Oral at bedtime  cefTRIAXone   IVPB 1 Gram(s) IV Intermittent every 24 hours  cefTRIAXone   IVPB      dextrose 5% + sodium chloride 0.9%. 1000 milliLiter(s) (75 mL/Hr) IV Continuous <Continuous>  pantoprazole  Injectable 40 milliGRAM(s) IV Push two times a day    MEDICATIONS  (PRN):  HYDROmorphone  Injectable 0.25 milliGRAM(s) IV Push every 4 hours PRN mild to moderate pain (1-6)  HYDROmorphone  Injectable 0.5 milliGRAM(s) IV Push every 4 hours PRN Severe Pain (7 - 10)      VITAL SIGNS:  T(F): 97.6 (03-22-19 @ 04:27), Max: 97.8 (03-21-19 @ 12:15)  HR: 75 (03-22-19 @ 04:27) (73 - 94)  BP: 104/63 (03-22-19 @ 04:27) (104/63 - 117/81)  RR: 18 (03-22-19 @ 04:27) (18 - 23)  SpO2: 97% (03-22-19 @ 04:27) (93% - 97%)    I&O's Summary    20 Mar 2019 07:01  -  21 Mar 2019 07:00  --------------------------------------------------------  IN: 2560 mL / OUT: 750 mL / NET: 1810 mL    21 Mar 2019 07:01  -  22 Mar 2019 06:32  --------------------------------------------------------  IN: 1200 mL / OUT: 750 mL / NET: 450 mL      Daily     Daily     PHYSICAL EXAM:  Gen: Alert, NAD  HEENT: NCAT, PERRL, EOMI, conjunctiva clear, sclera anicteric  Neck: Supple, no JVD  CV: RRR, S1S2, no m/r/g  Resp: bibasilar rales, normal respiratory effort  Abd: Soft, nontender, nondistended, normal bowel sounds  : [place] intact draining clear yellow urine  Ext: + peripheral pulses, no edema, clubbing, or cyanosis  Neuro: AOx1, MCWILLIAMS, appears to be largely aphasic  SKIN: No rashes or lesions    LABS:                        9.8    14.4  )-----------( 150      ( 21 Mar 2019 07:03 )             29.2     Hgb Trend: 9.8<--, 8.6<--, 9.8<--, 10.1<--, 11.0<--  03-21    136  |  104  |  41<H>  ----------------------------<  183<H>  3.6   |  17<L>  |  1.32<H>    Ca    7.9<L>      21 Mar 2019 06:52  Phos  2.1     03-21  Mg     2.0     03-21    TPro  5.4<L>  /  Alb  3.3  /  TBili  0.5  /  DBili  x   /  AST  66<H>  /  ALT  97<H>  /  AlkPhos  112  03-21    Creatinine Trend: 1.32<--, 1.72<--, 2.12<--, 2.21<--, 2.23<--, 2.03<--  LIVER FUNCTIONS - ( 21 Mar 2019 06:52 )  Alb: 3.3 g/dL / Pro: 5.4 g/dL / ALK PHOS: 112 U/L / ALT: 97 U/L / AST: 66 U/L / GGT: x           PT/INR - ( 21 Mar 2019 07:03 )   PT: 12.0 sec;   INR: 1.04 ratio         PTT - ( 21 Mar 2019 07:03 )  PTT:27.2 sec          CAPILLARY BLOOD GLUCOSE          RADIOLOGY & ADDITIONAL TESTS: Reviewed    Imaging Personally Reviewed:    Consultant(s) Notes Reviewed:      Care Discussed with Consultants/Other Providers: Alejandro Alicia, PGY1  Pager [phone]      Patient is a 73y old  Female who presents with a chief complaint of hematemesis (21 Mar 2019 16:56)      SUBJECTIVE/INTERVAL EVENTS: Patient seen and examined at bedside. SAM o/n. AOx1 to self. Follows some basic commands and is unresponsive to other. Denies any symptoms (gestures no with hands and head). Denies pain    MEDICATIONS  (STANDING):  atorvastatin 20 milliGRAM(s) Oral at bedtime  cefTRIAXone   IVPB 1 Gram(s) IV Intermittent every 24 hours  cefTRIAXone   IVPB      dextrose 5% + sodium chloride 0.9%. 1000 milliLiter(s) (75 mL/Hr) IV Continuous <Continuous>  pantoprazole  Injectable 40 milliGRAM(s) IV Push two times a day    MEDICATIONS  (PRN):  HYDROmorphone  Injectable 0.25 milliGRAM(s) IV Push every 4 hours PRN mild to moderate pain (1-6)  HYDROmorphone  Injectable 0.5 milliGRAM(s) IV Push every 4 hours PRN Severe Pain (7 - 10)      VITAL SIGNS:  T(F): 97.6 (03-22-19 @ 04:27), Max: 97.8 (03-21-19 @ 12:15)  HR: 75 (03-22-19 @ 04:27) (73 - 94)  BP: 104/63 (03-22-19 @ 04:27) (104/63 - 117/81)  RR: 18 (03-22-19 @ 04:27) (18 - 23)  SpO2: 97% (03-22-19 @ 04:27) (93% - 97%)    I&O's Summary    20 Mar 2019 07:01  -  21 Mar 2019 07:00  --------------------------------------------------------  IN: 2560 mL / OUT: 750 mL / NET: 1810 mL    21 Mar 2019 07:01  -  22 Mar 2019 06:32  --------------------------------------------------------  IN: 1200 mL / OUT: 750 mL / NET: 450 mL      Daily     Daily     PHYSICAL EXAM:  Gen: Alert, NAD  HEENT: NCAT, PERRL, EOMI, conjunctiva clear, sclera anicteric  Neck: Supple, no JVD  CV: RRR, S1S2, no m/r/g  Resp: bibasilar rales, normal respiratory effort  Abd: Soft, mild tenderness and distention but improved per daugthter, normal bowel sounds  : [place] intact draining clear yellow urine  Ext: + peripheral pulses, no edema, clubbing, or cyanosis  Neuro: AOx1, MCWILLIAMS, appears to be largely aphasic  SKIN: No rashes or lesions    LABS:                        9.8    14.4  )-----------( 150      ( 21 Mar 2019 07:03 )             29.2     Hgb Trend: 9.8<--, 8.6<--, 9.8<--, 10.1<--, 11.0<--  03-21    136  |  104  |  41<H>  ----------------------------<  183<H>  3.6   |  17<L>  |  1.32<H>    Ca    7.9<L>      21 Mar 2019 06:52  Phos  2.1     03-21  Mg     2.0     03-21    TPro  5.4<L>  /  Alb  3.3  /  TBili  0.5  /  DBili  x   /  AST  66<H>  /  ALT  97<H>  /  AlkPhos  112  03-21    Creatinine Trend: 1.32<--, 1.72<--, 2.12<--, 2.21<--, 2.23<--, 2.03<--  LIVER FUNCTIONS - ( 21 Mar 2019 06:52 )  Alb: 3.3 g/dL / Pro: 5.4 g/dL / ALK PHOS: 112 U/L / ALT: 97 U/L / AST: 66 U/L / GGT: x           PT/INR - ( 21 Mar 2019 07:03 )   PT: 12.0 sec;   INR: 1.04 ratio         PTT - ( 21 Mar 2019 07:03 )  PTT:27.2 sec          RADIOLOGY & ADDITIONAL TESTS: Reviewed    Imaging Personally Reviewed: Ct abd/pelvis:   IMPRESSION:   *  Multifocal hepatic metastases.  *  Peritoneal carcinomatosis.  *  Large amount of abdominal and pelvic ascites.  *  Nonocclusive clot in the infrarenal IVC and bilateral lower   extremities.    Consultant(s) Notes Reviewed:  IR, palliative, neuro    Care Discussed with Consultants/Other Providers:

## 2019-03-22 NOTE — PROGRESS NOTE ADULT - PROBLEM SELECTOR PLAN 10
DVT ppx: hold AC in setting of recent GIB and thrombocytopenia  PT eval DVT ppx: hold AC in setting of recent GIB and thrombocytopenia  PT eval  Code: DNR/DNI DVT ppx: hold AC in setting of recent GIB and thrombocytopenia  PT eval  Dispo: will discuss home hospice today  Code: DNR/DNI DVT ppx: hold AC in setting of recent GIB and thrombocytopenia  PT eval  Dispo: pending hospice eval today  Code: DNR/DNI

## 2019-03-22 NOTE — PROGRESS NOTE ADULT - PROBLEM SELECTOR PLAN 2
CTH: Left frontal age-indeterminate infarct. Residual gyriform hyperintensity may represent residual contrast material, petechial hemorrhage or laminar necrosis.  MRH: Probable subacute left frontal infarct. Multiple bilateral hemispheric and probable right cerebellar punctate acute lacunar type infarcts. Findings suggest embolic phenomenon. Metastatic disease not completely excluded.  - will monitor off antiplatelet given risk of bleeding  - c/w atorva 20 Likely 2/2 ulcerated gastric tumor. Normal esophagus and duodenum on EGD  - No signs of active bleeding  - Monitor CBC, transfuse for Hgb <7  - DNI Likely 2/2 ulcerated gastric tumor. Normal esophagus and duodenum on EGD  - No signs of active bleeding  - Monitor CBC, transfuse for Hgb <7  - GI was followin Likely 2/2 ulcerated gastric tumor. Normal esophagus and duodenum on EGD  - No signs of active bleeding  - Monitor CBC, transfuse for Hgb <7  - c/w IV pain meds  - GI was followin

## 2019-03-22 NOTE — PROGRESS NOTE ADULT - PROBLEM SELECTOR PLAN 6
CTA showing cirrhotic liver and mod volume ascites. Ammonia 22.  - unclear etiology, ?KIM  - no varices on EGD  - mod ascites, pending diagnostic and therapeutic paracentesis today by IR  - c/w empiric CTX given recent hematemesis in setting of cirrhosis  - thrombocytopenia likely 2/2 cirrhosis CT showing cirrhotic liver and large ascites. Ammonia 22.  - unclear etiology, ?KIM  - no varices on EGD  - s/p para on 3/21 removed 5.7 L  - negative for SBP  - SAAG 1.17 c/w portal HTN from cirrhosis  - f/u pleural Cx  - thrombocytopenia likely 2/2 cirrhosis CT showing cirrhotic liver and large ascites. Ammonia 22.  - unclear etiology, ?KIM  - no varices on EGD  - s/p para on 3/21 removed 5.7 L  - ascites fluid negative for SBP  - SAAG 1.17 c/w portal HTN from cirrhosis  - f/u pleural Cx  - c/w prophylactic CTX 3/18-3/24  - thrombocytopenia likely 2/2 cirrhosis CT showing cirrhotic liver and large ascites. Ammonia 22.  - unclear etiology, ?KIM  - no varices on EGD  - s/p para on 3/21 removed 5.7 L  - ascites fluid negative for SBP  - SAAG 0.4 could be 2/2 abdominal mets  - f/u pleural Cx  - c/w prophylactic CTX 3/18-3/24  - thrombocytopenia likely 2/2 cirrhosis Extensive b/l PE likely 2/2 malignancy and LE DVT  - s/p IVC filter  - monitor O2 sat, currently on 2L NC  - Discussed risks benefits of AC with family; will monitor off AC per family's wishes

## 2019-03-22 NOTE — PROGRESS NOTE ADULT - PROBLEM SELECTOR PLAN 1
Likely 2/2 ulcerated gastric mass on EGD. Normal esophagus and duodenum.  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  - not a candidate for chemo per onc, discussed with patient and family  - DNR/DNI  - pending discussion with hospice for home hospice gastric adenomacarcinoma with CT findings of liver mets and peritoneal carcinomatosis and MRI showing possible brain mets.  - not a candidate for chemo per onc, discussed with patient and family  - DNR/DNI  - palliative consulted, daughter wants home hospice, eval pending

## 2019-03-22 NOTE — PROGRESS NOTE ADULT - PROBLEM SELECTOR PLAN 3
Has been largely aphasic since extubation. Normal speech at baseline although she was less interested in talking over the past several days PTA.  - presumably 2/2 CVA, will monitor  - Follows commands and appears to have understanding, has been more interactive with family and gives simple one word responses Has been largely aphasic since extubation. Normal speech at baseline although she was less interested in talking over the past several days PTA.  - presumably 2/2 CVA, will monitor  - Follows commands and appears to have understanding, has been more interactive with family and gives some simple one word responses CT showed cirrhotic liver with large ascites. Ammonia 22.  - unclear etiology, ?KIM  - s/p para on 3/21 with removal of 5.7 L, SAAG 0.4 could be 2/2 abdominal mets  - ascites fluid negative for SBP, f/u pleural Cx  - c/w prophylactic CTX 3/18-3/24  - thrombocytopenia likely 2/2 cirrhosis CT showed cirrhotic liver with large ascites  - s/p para on 3/21 with removal of 5.7 L, SAAG 0.4 likely malignant ascites and/or KIM  - ascites fluid negative for SBP, f/u pleural Cx  - c/w prophylactic CTX 3/18-3/24  - thrombocytopenia likely 2/2 cirrhosis

## 2019-03-22 NOTE — PROGRESS NOTE ADULT - ASSESSMENT
73y F HTN, HLD, COPD, GERD, RA presented with several days of decreased appetite, weakness, FTT found to have bilateral PE, was initially started on heparin gtt now s/p reversal and off AC due to hematemesis, s/p intubation and extubation, s/p IVC filter. 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 73y F HTN, HLD, COPD, GERD, RA presented with several days of decreased appetite, weakness, FTT found to have bilateral PE, was initially started on heparin gtt now s/p reversal and off AC due to hematemesis, s/p intubation and extubation, s/p IVC filter. 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

## 2019-03-22 NOTE — GOALS OF CARE CONVERSATION - PERSONAL ADVANCE DIRECTIVE - CONVERSATION DETAILS
Pt referred for hospice evaluation by Palliative Care Team.  Arranged to meet with pt's daughter with whom she lives on Monday 3/25 at 11 a.m.

## 2019-03-22 NOTE — PROGRESS NOTE ADULT - PROBLEM SELECTOR PLAN 4
Extensive b/l PE likely 2/2 malignancy and b/l LE DVT. TTE: EF 66% with normal LV/RVSF.  - s/p IVC filter  - monitor O2 sat, currently on 2L NC  - monitor off AC, risks benefits of AC discussed with family CTH: Left frontal age-indeterminate infarct. Gyriform hyperintensity possibly petechial hemorrhage. MRH: Multiple acute lacunar type infarcts. Findings suggest embolic phenomenon.  - discussed risks benefits of antiplatelet therapy with patient and HCP daughter at bedside; will monitor off any AC per family's decision  - c/w atorva 20

## 2019-03-23 LAB
ALBUMIN SERPL ELPH-MCNC: 2.4 G/DL — LOW (ref 3.3–5)
ALP SERPL-CCNC: 130 U/L — HIGH (ref 40–120)
ALT FLD-CCNC: 58 U/L — HIGH (ref 10–45)
ANION GAP SERPL CALC-SCNC: 13 MMOL/L — SIGNIFICANT CHANGE UP (ref 5–17)
AST SERPL-CCNC: 38 U/L — SIGNIFICANT CHANGE UP (ref 10–40)
BILIRUB SERPL-MCNC: 0.5 MG/DL — SIGNIFICANT CHANGE UP (ref 0.2–1.2)
BUN SERPL-MCNC: 26 MG/DL — HIGH (ref 7–23)
CALCIUM SERPL-MCNC: 7.6 MG/DL — LOW (ref 8.4–10.5)
CHLORIDE SERPL-SCNC: 107 MMOL/L — SIGNIFICANT CHANGE UP (ref 96–108)
CO2 SERPL-SCNC: 18 MMOL/L — LOW (ref 22–31)
CREAT SERPL-MCNC: 0.85 MG/DL — SIGNIFICANT CHANGE UP (ref 0.5–1.3)
GLUCOSE SERPL-MCNC: 131 MG/DL — HIGH (ref 70–99)
MAGNESIUM SERPL-MCNC: 2 MG/DL — SIGNIFICANT CHANGE UP (ref 1.6–2.6)
PHOSPHATE SERPL-MCNC: 2.5 MG/DL — SIGNIFICANT CHANGE UP (ref 2.5–4.5)
POTASSIUM SERPL-MCNC: 4.2 MMOL/L — SIGNIFICANT CHANGE UP (ref 3.5–5.3)
POTASSIUM SERPL-SCNC: 4.2 MMOL/L — SIGNIFICANT CHANGE UP (ref 3.5–5.3)
PROT SERPL-MCNC: 4.3 G/DL — LOW (ref 6–8.3)
SODIUM SERPL-SCNC: 138 MMOL/L — SIGNIFICANT CHANGE UP (ref 135–145)

## 2019-03-23 PROCEDURE — 99233 SBSQ HOSP IP/OBS HIGH 50: CPT | Mod: GC

## 2019-03-23 RX ADMIN — PANTOPRAZOLE SODIUM 40 MILLIGRAM(S): 20 TABLET, DELAYED RELEASE ORAL at 05:08

## 2019-03-23 RX ADMIN — CEFTRIAXONE 100 GRAM(S): 500 INJECTION, POWDER, FOR SOLUTION INTRAMUSCULAR; INTRAVENOUS at 12:26

## 2019-03-23 RX ADMIN — HYDROMORPHONE HYDROCHLORIDE 0.25 MILLIGRAM(S): 2 INJECTION INTRAMUSCULAR; INTRAVENOUS; SUBCUTANEOUS at 08:27

## 2019-03-23 RX ADMIN — HYDROMORPHONE HYDROCHLORIDE 0.25 MILLIGRAM(S): 2 INJECTION INTRAMUSCULAR; INTRAVENOUS; SUBCUTANEOUS at 09:00

## 2019-03-23 RX ADMIN — PANTOPRAZOLE SODIUM 40 MILLIGRAM(S): 20 TABLET, DELAYED RELEASE ORAL at 17:58

## 2019-03-23 RX ADMIN — SODIUM CHLORIDE 75 MILLILITER(S): 9 INJECTION, SOLUTION INTRAVENOUS at 04:57

## 2019-03-23 RX ADMIN — HYDROMORPHONE HYDROCHLORIDE 0.25 MILLIGRAM(S): 2 INJECTION INTRAMUSCULAR; INTRAVENOUS; SUBCUTANEOUS at 19:00

## 2019-03-23 RX ADMIN — HYDROMORPHONE HYDROCHLORIDE 0.25 MILLIGRAM(S): 2 INJECTION INTRAMUSCULAR; INTRAVENOUS; SUBCUTANEOUS at 18:24

## 2019-03-23 NOTE — PROGRESS NOTE ADULT - PROBLEM SELECTOR PLAN 4
CTH: Left frontal age-indeterminate infarct. Gyriform hyperintensity possibly petechial hemorrhage. MRH: Multiple acute lacunar type infarcts. Findings suggest embolic phenomenon.  - discussed risks benefits of antiplatelet therapy with patient and HCP daughter at bedside; will monitor off any AC per family's decision  - c/w atorva 20

## 2019-03-23 NOTE — PROGRESS NOTE ADULT - PROBLEM SELECTOR PLAN 2
Likely 2/2 ulcerated gastric tumor. Normal esophagus and duodenum on EGD  - No signs of active bleeding  - Monitor CBC, transfuse for Hgb <7  - c/w IV pain meds  - GI was followin Likely 2/2 ulcerated gastric tumor. Normal esophagus and duodenum on EGD  - No signs of active bleeding  - Monitor CBC, transfuse for Hgb <7  - GI was following

## 2019-03-23 NOTE — PROGRESS NOTE ADULT - PROBLEM SELECTOR PLAN 3
CT showed cirrhotic liver with large ascites  - s/p para on 3/21 with removal of 5.7 L, SAAG 0.4 likely malignant ascites and/or KIM  - ascites fluid negative for SBP, f/u pleural Cx  - c/w prophylactic CTX 3/18-3/24  - thrombocytopenia likely 2/2 cirrhosis CT showed cirrhotic liver with large ascites  - s/p para on 3/21 with removal of 5.7 L, SAAG 0.4 likely malignant ascites and/or KIM  - ascites fluid negative for SBP, f/u pleural Cx NTD  - c/w prophylactic CTX 3/18-3/24  - thrombocytopenia likely 2/2 cirrhosis

## 2019-03-23 NOTE — PROGRESS NOTE ADULT - PROBLEM SELECTOR PLAN 6
Extensive b/l PE likely 2/2 malignancy and LE DVT  - s/p IVC filter  - monitor O2 sat, currently on 2L NC  - Discussed risks benefits of AC with family; will monitor off AC per family's wishes

## 2019-03-23 NOTE — PROGRESS NOTE ADULT - SUBJECTIVE AND OBJECTIVE BOX
Alejandro Vargas, PGY1  Pager [phone]    INCOMPLETE NOTE - IN PROGRESS    Patient is a 73y old  Female who presents with a chief complaint of hematemesis (22 Mar 2019 06:32)      SUBJECTIVE/INTERVAL EVENTS: Patient seen and examined at bedside. SAM o/n.    MEDICATIONS  (STANDING):  cefTRIAXone   IVPB 1 Gram(s) IV Intermittent every 24 hours  dextrose 5% + sodium chloride 0.9%. 1000 milliLiter(s) (75 mL/Hr) IV Continuous <Continuous>  pantoprazole  Injectable 40 milliGRAM(s) IV Push two times a day    MEDICATIONS  (PRN):  HYDROmorphone  Injectable 0.25 milliGRAM(s) IV Push every 4 hours PRN mild to moderate pain (1-6)  HYDROmorphone  Injectable 0.5 milliGRAM(s) IV Push every 4 hours PRN Severe Pain (7 - 10)      VITAL SIGNS:  T(F): 97.9 (03-23-19 @ 04:33), Max: 97.9 (03-23-19 @ 04:33)  HR: 74 (03-23-19 @ 04:33) (73 - 75)  BP: 116/70 (03-23-19 @ 04:33) (113/74 - 116/70)  RR: 18 (03-23-19 @ 04:33) (18 - 18)  SpO2: 97% (03-23-19 @ 04:33) (93% - 97%)    I&O's Summary    21 Mar 2019 07:01  -  22 Mar 2019 07:00  --------------------------------------------------------  IN: 1200 mL / OUT: 750 mL / NET: 450 mL    22 Mar 2019 07:01  -  23 Mar 2019 06:24  --------------------------------------------------------  IN: 360 mL / OUT: 0 mL / NET: 360 mL      Daily     Daily     PHYSICAL EXAM:  Gen: Alert, well-developed, NAD  HEENT: NCAT, PERRL, EOMI, conjunctiva clear, sclera anicteric, no erythema or exudates in the oropharynx, mmm  Neck: Supple, no JVD  CV: RRR, S1S2, no m/r/g  Resp: CTAB, normal respiratory effort  Abd: Soft, nontender, nondistended, normal bowel sounds  Ext: + peripheral pulses, no edema, clubbing, or cyanosis  Neuro: AOx3, no focal deficits  SKIN: No rashes or lesions    LABS:                        10.4   16.3  )-----------( 159      ( 22 Mar 2019 07:05 )             31.3     Hgb Trend: 10.4<--, 9.8<--, 8.6<--, 9.8<--, 10.1<--  03-22    139  |  108  |  34<H>  ----------------------------<  161<H>  4.1   |  18<L>  |  1.02    Ca    7.7<L>      22 Mar 2019 06:56  Phos  1.9     03-22  Mg     2.0     03-22    TPro  4.6<L>  /  Alb  2.7<L>  /  TBili  0.4  /  DBili  x   /  AST  45<H>  /  ALT  74<H>  /  AlkPhos  123<H>  03-22    Creatinine Trend: 1.02<--, 1.32<--, 1.72<--, 2.12<--, 2.21<--, 2.23<--  LIVER FUNCTIONS - ( 22 Mar 2019 06:56 )  Alb: 2.7 g/dL / Pro: 4.6 g/dL / ALK PHOS: 123 U/L / ALT: 74 U/L / AST: 45 U/L / GGT: x           PT/INR - ( 21 Mar 2019 07:03 )   PT: 12.0 sec;   INR: 1.04 ratio         PTT - ( 21 Mar 2019 07:03 )  PTT:27.2 sec          CAPILLARY BLOOD GLUCOSE          RADIOLOGY & ADDITIONAL TESTS: Reviewed    Imaging Personally Reviewed:    Consultant(s) Notes Reviewed:      Care Discussed with Consultants/Other Providers: Alejandro Vargas, PGY1  Pager [phone]      Patient is a 73y old  Female who presents with a chief complaint of hematemesis (22 Mar 2019 06:32)      SUBJECTIVE/INTERVAL EVENTS: Patient seen and examined at bedside. SAM o/n. AOx1 to self and states "28" in Lao to location and date (patient's  includes 28). Says "I want to go home" and "water" in [place]o to daughter at bedside.    MEDICATIONS  (STANDING):  cefTRIAXone   IVPB 1 Gram(s) IV Intermittent every 24 hours  dextrose 5% + sodium chloride 0.9%. 1000 milliLiter(s) (75 mL/Hr) IV Continuous <Continuous>  pantoprazole  Injectable 40 milliGRAM(s) IV Push two times a day    MEDICATIONS  (PRN):  HYDROmorphone  Injectable 0.25 milliGRAM(s) IV Push every 4 hours PRN mild to moderate pain (1-6)  HYDROmorphone  Injectable 0.5 milliGRAM(s) IV Push every 4 hours PRN Severe Pain (7 - 10)      VITAL SIGNS:  T(F): 97.9 (19 @ 04:33), Max: 97.9 (19 @ 04:33)  HR: 74 (19 @ 04:33) (73 - 75)  BP: 116/70 (19 @ 04:33) (113/74 - 116/70)  RR: 18 (19 @ 04:33) (18 - 18)  SpO2: 97% (19 @ 04:33) (93% - 97%)    I&O's Summary    21 Mar 2019 07:  -  22 Mar 2019 07:00  --------------------------------------------------------  IN: 1200 mL / OUT: 750 mL / NET: 450 mL    22 Mar 2019 07:  -  23 Mar 2019 06:24  --------------------------------------------------------  IN: 360 mL / OUT: 0 mL / NET: 360 mL      Daily     Daily     PHYSICAL EXAM:  Gen: Alert, NAD  HEENT: NCAT, EOMI, conjunctiva clear, sclera anicteric  Neck: Supple, no JVD  CV: RRR, S1S2, no m/r/g  Resp: basilar crackles, normal respiratory effort  Abd: Soft, nontender, nondistended, normal bowel sounds  Ext: + peripheral pulses, no edema, clubbing, or cyanosis  Neuro: AOx1, responds mostly by gesturing yes and no with head and hands, follows commands  SKIN: No rashes or lesions    LABS:                        10.4   16.3  )-----------( 159      ( 22 Mar 2019 07:05 )             31.3     Hgb Trend: 10.4<--, 9.8<--, 8.6<--, 9.8<--, 10.1<--  03-22    139  |  108  |  34<H>  ----------------------------<  161<H>  4.1   |  18<L>  |  1.02    Ca    7.7<L>      22 Mar 2019 06:56  Phos  1.9       Mg     2.0         TPro  4.6<L>  /  Alb  2.7<L>  /  TBili  0.4  /  DBili  x   /  AST  45<H>  /  ALT  74<H>  /  AlkPhos  123<H>      Creatinine Trend: 1.02<--, 1.32<--, 1.72<--, 2.12<--, 2.21<--, 2.23<--  LIVER FUNCTIONS - ( 22 Mar 2019 06:56 )  Alb: 2.7 g/dL / Pro: 4.6 g/dL / ALK PHOS: 123 U/L / ALT: 74 U/L / AST: 45 U/L / GGT: x           PT/INR - ( 21 Mar 2019 07:03 )   PT: 12.0 sec;   INR: 1.04 ratio         PTT - ( 21 Mar 2019 07:03 )  PTT:27.2 sec          CAPILLARY BLOOD GLUCOSE          RADIOLOGY & ADDITIONAL TESTS: Reviewed    Imaging Personally Reviewed:    Consultant(s) Notes Reviewed:      Care Discussed with Consultants/Other Providers:

## 2019-03-23 NOTE — PROGRESS NOTE ADULT - PROBLEM SELECTOR PLAN 8
Likely 2/2 hypoperfusion in setting of GI bleed and decreased PO intake (specific gravity >1.05) vs contrast-induced nephropathy.  - resolved s/p IV and oral hydration Likely 2/2 hypoperfusion in setting of GI bleed and decreased PO intake (specific gravity >1.05) vs contrast-induced nephropathy  - resolved s/p IV and oral hydration

## 2019-03-23 NOTE — PROGRESS NOTE ADULT - PROBLEM SELECTOR PLAN 1
gastric adenomacarcinoma with CT findings of liver mets and peritoneal carcinomatosis and MRI showing possible brain mets.  - not a candidate for chemo per onc, discussed with patient and family  - DNR/DNI  - palliative consulted, daughter wants home hospice, eval pending Gastric adenomacarcinoma with CT findings of liver mets and peritoneal carcinomatosis and MRI showing possible brain mets.  - not a candidate for chemo per onc, discussed with patient and family  - DNR/DNI  - c/w IV pain meds  - palliative consulted, daughter wants home hospice, eval pending

## 2019-03-23 NOTE — PROGRESS NOTE ADULT - ASSESSMENT
73y F HTN, HLD, COPD, GERD, RA presented with several days of decreased appetite, weakness, FTT found to have bilateral PE, was initially started on heparin gtt now s/p reversal and off AC due to hematemesis, s/p intubation and extubation, s/p IVC filter. 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

## 2019-03-23 NOTE — PROGRESS NOTE ADULT - PROBLEM SELECTOR PLAN 10
DVT ppx: hold AC in setting of recent GIB and thrombocytopenia  PT eval  Dispo: pending hospice eval today  Code: DNR/DNI DVT ppx: hold AC in setting of recent GIB and thrombocytopenia  PT eval  Dispo: pending hospice meeting  Code: DNR/DNI

## 2019-03-23 NOTE — PROGRESS NOTE ADULT - PROBLEM SELECTOR PLAN 5
Has been largely aphasic since extubation. Normal speech at baseline.  - Likely 2/2 CVA  - Follows commands and appears to have understanding, has been more interactive with family and gives some simple one word responses Has been largely aphasic since extubation. Normal speech at baseline.  - Likely 2/2 CVA  - Follows commands and appears to have understanding, has been more interactive with family making mostly one word statements to occasional short sentences

## 2019-03-24 DIAGNOSIS — K59.01 SLOW TRANSIT CONSTIPATION: ICD-10-CM

## 2019-03-24 PROCEDURE — 99233 SBSQ HOSP IP/OBS HIGH 50: CPT | Mod: GC

## 2019-03-24 RX ORDER — POLYETHYLENE GLYCOL 3350 17 G/17G
17 POWDER, FOR SOLUTION ORAL DAILY
Qty: 0 | Refills: 0 | Status: DISCONTINUED | OUTPATIENT
Start: 2019-03-24 | End: 2019-03-25

## 2019-03-24 RX ORDER — SODIUM CHLORIDE 9 MG/ML
1000 INJECTION, SOLUTION INTRAVENOUS
Qty: 0 | Refills: 0 | Status: DISCONTINUED | OUTPATIENT
Start: 2019-03-24 | End: 2019-03-26

## 2019-03-24 RX ADMIN — SODIUM CHLORIDE 75 MILLILITER(S): 9 INJECTION, SOLUTION INTRAVENOUS at 11:27

## 2019-03-24 RX ADMIN — CEFTRIAXONE 100 GRAM(S): 500 INJECTION, POWDER, FOR SOLUTION INTRAMUSCULAR; INTRAVENOUS at 11:28

## 2019-03-24 RX ADMIN — HYDROMORPHONE HYDROCHLORIDE 0.5 MILLIGRAM(S): 2 INJECTION INTRAMUSCULAR; INTRAVENOUS; SUBCUTANEOUS at 19:12

## 2019-03-24 RX ADMIN — HYDROMORPHONE HYDROCHLORIDE 0.5 MILLIGRAM(S): 2 INJECTION INTRAMUSCULAR; INTRAVENOUS; SUBCUTANEOUS at 18:42

## 2019-03-24 RX ADMIN — PANTOPRAZOLE SODIUM 40 MILLIGRAM(S): 20 TABLET, DELAYED RELEASE ORAL at 05:49

## 2019-03-24 RX ADMIN — HYDROMORPHONE HYDROCHLORIDE 0.25 MILLIGRAM(S): 2 INJECTION INTRAMUSCULAR; INTRAVENOUS; SUBCUTANEOUS at 03:00

## 2019-03-24 RX ADMIN — HYDROMORPHONE HYDROCHLORIDE 0.25 MILLIGRAM(S): 2 INJECTION INTRAMUSCULAR; INTRAVENOUS; SUBCUTANEOUS at 03:15

## 2019-03-24 RX ADMIN — POLYETHYLENE GLYCOL 3350 17 GRAM(S): 17 POWDER, FOR SOLUTION ORAL at 11:30

## 2019-03-24 RX ADMIN — PANTOPRAZOLE SODIUM 40 MILLIGRAM(S): 20 TABLET, DELAYED RELEASE ORAL at 18:44

## 2019-03-24 NOTE — PROGRESS NOTE ADULT - PROBLEM SELECTOR PLAN 8
Resolving; likely 2/2 hypoperfusion in setting of GI bleed and decreased PO intake (specific gravity >1.05) vs contrast-induced nephropathy  - resolved s/p IV and oral hydration

## 2019-03-24 NOTE — PROGRESS NOTE ADULT - PROBLEM SELECTOR PLAN 1
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  - not a candidate for chemo per onc, discussed with patient and family  - DNR/DNI, pending hospice evaluation  - c/w IV pain meds PRN, initiate bowel regimen

## 2019-03-24 NOTE — PROGRESS NOTE ADULT - PROBLEM SELECTOR PLAN 3
In the setting of opiates and decreased PO intake  -start Miralax daily; escalate bowel regimen as needed

## 2019-03-24 NOTE — PROGRESS NOTE ADULT - SUBJECTIVE AND OBJECTIVE BOX
Joaquim Bruno, PGY-3  Internal Medicine Resident  Pager: [phone]  After 7PM on Weekdays/After 12PM on Weekends: [phone]    Patient is a 73y old  Female who presents with a chief complaint of hematemesis (23 Mar 2019 06:24)      SUBJECTIVE / OVERNIGHT EVENTS:    REVIEW OF SYSTEMS:  CONSTITUTIONAL: No weakness, fevers or chills  EYES/ENT: No visual changes;  No vertigo or throat pain   NECK: No pain or stiffness  RESPIRATORY: No cough, wheezing, hemoptysis; No shortness of breath  CARDIOVASCULAR: No chest pain or palpitations  GASTROINTESTINAL: No abdominal pain, nausea, vomiting, or diarrhea. No melena or hematochezia.  GENITOURINARY: No dysuria, frequency or hematuria  NEUROLOGICAL: No numbness or weakness  SKIN: No itching, burning, rashes, or lesions   All other review of systems is negative unless indicated above.    MEDICATIONS  (STANDING):  cefTRIAXone   IVPB 1 Gram(s) IV Intermittent every 24 hours  dextrose 5% + sodium chloride 0.9%. 1000 milliLiter(s) (75 mL/Hr) IV Continuous <Continuous>  pantoprazole  Injectable 40 milliGRAM(s) IV Push two times a day    MEDICATIONS  (PRN):  HYDROmorphone  Injectable 0.25 milliGRAM(s) IV Push every 4 hours PRN mild to moderate pain (1-6)  HYDROmorphone  Injectable 0.5 milliGRAM(s) IV Push every 4 hours PRN Severe Pain (7 - 10)      T(C): 36.5 (03-24-19 @ 05:55), Max: 36.6 (03-23-19 @ 14:21)  HR: 83 (03-24-19 @ 05:55) (74 - 83)  BP: 131/79 (03-24-19 @ 05:55) (109/63 - 136/70)  RR: 18 (03-24-19 @ 05:55) (18 - 18)  SpO2: 96% (03-24-19 @ 05:55) (96% - 97%)    CAPILLARY BLOOD GLUCOSE        I&O's Summary    22 Mar 2019 07:01  -  23 Mar 2019 07:00  --------------------------------------------------------  IN: 555 mL / OUT: 500 mL / NET: 55 mL    23 Mar 2019 07:01  -  24 Mar 2019 06:46  --------------------------------------------------------  IN: 260 mL / OUT: 120 mL / NET: 140 mL        GENERAL: No acute distress, well-developed  HEAD:  Atraumatic, Normocephalic  ENT: EOMI, PERRLA, No JVD, moist mucosa  CHEST/LUNG: Clear to auscultation bilaterally  BACK: No spinal tenderness  HEART: Regular rate and rhythm; No murmurs, rubs, or gallops  ABDOMEN: Soft, Nontender, Nondistended; Bowel sounds present  EXTREMITIES:  No clubbing, cyanosis, or edema  PSYCH: Nl behavior, nl affect  NEUROLOGY: AAOx3, non-focal, cranial nerves intact  SKIN: Normal color, No rashes or lesions    LABS:                        10.4   16.3  )-----------( 159      ( 22 Mar 2019 07:05 )             31.3     03-23    138  |  107  |  26<H>  ----------------------------<  131<H>  4.2   |  18<L>  |  0.85    Ca    7.6<L>      23 Mar 2019 07:40  Phos  2.5     03-23  Mg     2.0     03-23    TPro  4.3<L>  /  Alb  2.4<L>  /  TBili  0.5  /  DBili  x   /  AST  38  /  ALT  58<H>  /  AlkPhos  130<H>  03-23            RADIOLOGY & ADDITIONAL TESTS:  Imaging Personally Reviewed:  Consultant(s) Notes Reviewed:    Care Discussed with Consultants/Other Providers: Joaquim Bruno, PGY-3  Internal Medicine Resident  Pager: [phone]  After 7PM on Weekdays/After 12PM on Weekends: 2013    Patient is a 73y old  Female who presents with a chief complaint of hematemesis (23 Mar 2019 06:24)      SUBJECTIVE / OVERNIGHT EVENTS: no acute events overnight. more awake today. Daughter at bedside opting to translate. Pt says she wants to go home. Daughter thinks the pt might have more pain. also has not had significant BM in several days.    REVIEW OF SYSTEMS:  unable to obtain due to mental status    MEDICATIONS  (STANDING):  cefTRIAXone   IVPB 1 Gram(s) IV Intermittent every 24 hours  dextrose 5% + sodium chloride 0.9%. 1000 milliLiter(s) (75 mL/Hr) IV Continuous <Continuous>  pantoprazole  Injectable 40 milliGRAM(s) IV Push two times a day    MEDICATIONS  (PRN):  HYDROmorphone  Injectable 0.25 milliGRAM(s) IV Push every 4 hours PRN mild to moderate pain (1-6)  HYDROmorphone  Injectable 0.5 milliGRAM(s) IV Push every 4 hours PRN Severe Pain (7 - 10)      T(C): 36.5 (03-24-19 @ 05:55), Max: 36.6 (03-23-19 @ 14:21)  HR: 83 (03-24-19 @ 05:55) (74 - 83)  BP: 131/79 (03-24-19 @ 05:55) (109/63 - 136/70)  RR: 18 (03-24-19 @ 05:55) (18 - 18)  SpO2: 96% (03-24-19 @ 05:55) (96% - 97%)    CAPILLARY BLOOD GLUCOSE        I&O's Summary    22 Mar 2019 07:01  -  23 Mar 2019 07:00  --------------------------------------------------------  IN: 555 mL / OUT: 500 mL / NET: 55 mL    23 Mar 2019 07:01  -  24 Mar 2019 06:46  --------------------------------------------------------  IN: 260 mL / OUT: 120 mL / NET: 140 mL        Gen: Awake, NAD  HEENT: NCAT, EOMI, conjunctiva clear, sclera anicteric  Neck: Supple, no JVD  CV: RRR, S1S2, no m/r/g  Resp: basilar crackles, normal respiratory effort  Abd: Soft, nontender, nondistended, normal bowel sounds  Ext: + peripheral pulses, no edema, clubbing, or cyanosis  Neuro: AOx1, responds mostly by gesturing yes and no with head and hands, follows commands  SKIN: No rashes or lesions    LABS:                        10.4   16.3  )-----------( 159      ( 22 Mar 2019 07:05 )             31.3     03-23    138  |  107  |  26<H>  ----------------------------<  131<H>  4.2   |  18<L>  |  0.85    Ca    7.6<L>      23 Mar 2019 07:40  Phos  2.5     03-23  Mg     2.0     03-23    TPro  4.3<L>  /  Alb  2.4<L>  /  TBili  0.5  /  DBili  x   /  AST  38  /  ALT  58<H>  /  AlkPhos  130<H>  03-23            RADIOLOGY & ADDITIONAL TESTS:  Imaging Personally Reviewed:  Consultant(s) Notes Reviewed:    Care Discussed with Consultants/Other Providers:

## 2019-03-24 NOTE — PROGRESS NOTE ADULT - PROBLEM SELECTOR PLAN 10
DVT ppx: hold AC in setting of recent GIB and thrombocytopenia  PT eval  Dispo: pending hospice meeting  Code: DNR/DNI

## 2019-03-24 NOTE — PROGRESS NOTE ADULT - PROBLEM SELECTOR PLAN 2
Likely 2/2 ulcerated gastric tumor. Normal esophagus and duodenum on EGD  - No signs of active bleeding  - Monitor CBC intermittently, transfuse for Hgb <7  - appreciate GI recs

## 2019-03-24 NOTE — PROGRESS NOTE ADULT - PROBLEM SELECTOR PLAN 4
CT showed cirrhotic liver with large ascites  - s/p para on 3/21 with removal of 5.7 L, SAAG 0.4 likely malignant ascites and/or KIM  - ascites fluid negative for SBP, f/u pleural Cx NTD  - c/w prophylactic CTX 3/18-3/24  - thrombocytopenia likely 2/2 cirrhosis

## 2019-03-24 NOTE — PROGRESS NOTE ADULT - PROBLEM SELECTOR PLAN 6
Has been largely aphasic since extubation. Normal speech at baseline.  - Likely 2/2 CVA  - Follows commands and appears to have understanding, has been more interactive with family making mostly one word statements to occasional short sentences

## 2019-03-24 NOTE — PROGRESS NOTE ADULT - ASSESSMENT
73y F HTN, HLD, COPD, GERD, RA presented with several days of decreased appetite, weakness, FTT found to have bilateral PE, was initially started on heparin gtt now s/p reversal and off AC due to hematemesis, s/p intubation and extubation, s/p IVC filter. 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       INTERVAL CHANGES:  -completes CTX today  -started Miralax  -IVF x12hrs

## 2019-03-25 ENCOUNTER — APPOINTMENT (OUTPATIENT)
Dept: INTERNAL MEDICINE | Facility: CLINIC | Age: 74
End: 2019-03-25

## 2019-03-25 PROCEDURE — 99233 SBSQ HOSP IP/OBS HIGH 50: CPT | Mod: GC

## 2019-03-25 RX ORDER — PANTOPRAZOLE SODIUM 20 MG/1
40 TABLET, DELAYED RELEASE ORAL
Qty: 0 | Refills: 0 | Status: DISCONTINUED | OUTPATIENT
Start: 2019-03-25 | End: 2019-03-26

## 2019-03-25 RX ORDER — POLYETHYLENE GLYCOL 3350 17 G/17G
17 POWDER, FOR SOLUTION ORAL
Qty: 0 | Refills: 0 | Status: DISCONTINUED | OUTPATIENT
Start: 2019-03-25 | End: 2019-03-26

## 2019-03-25 RX ADMIN — HYDROMORPHONE HYDROCHLORIDE 0.5 MILLIGRAM(S): 2 INJECTION INTRAMUSCULAR; INTRAVENOUS; SUBCUTANEOUS at 03:26

## 2019-03-25 RX ADMIN — HYDROMORPHONE HYDROCHLORIDE 0.25 MILLIGRAM(S): 2 INJECTION INTRAMUSCULAR; INTRAVENOUS; SUBCUTANEOUS at 16:45

## 2019-03-25 RX ADMIN — PANTOPRAZOLE SODIUM 40 MILLIGRAM(S): 20 TABLET, DELAYED RELEASE ORAL at 05:15

## 2019-03-25 RX ADMIN — HYDROMORPHONE HYDROCHLORIDE 0.5 MILLIGRAM(S): 2 INJECTION INTRAMUSCULAR; INTRAVENOUS; SUBCUTANEOUS at 23:23

## 2019-03-25 RX ADMIN — POLYETHYLENE GLYCOL 3350 17 GRAM(S): 17 POWDER, FOR SOLUTION ORAL at 11:31

## 2019-03-25 RX ADMIN — HYDROMORPHONE HYDROCHLORIDE 0.5 MILLIGRAM(S): 2 INJECTION INTRAMUSCULAR; INTRAVENOUS; SUBCUTANEOUS at 23:53

## 2019-03-25 RX ADMIN — HYDROMORPHONE HYDROCHLORIDE 0.25 MILLIGRAM(S): 2 INJECTION INTRAMUSCULAR; INTRAVENOUS; SUBCUTANEOUS at 15:40

## 2019-03-25 NOTE — PROGRESS NOTE ADULT - PROBLEM SELECTOR PLAN 3
In the setting of opiates and decreased PO intake  -start Miralax daily; escalate bowel regimen as needed In the setting of opiates and decreased PO intake  - miralax daily; escalate bowel regimen as needed

## 2019-03-25 NOTE — PROGRESS NOTE ADULT - PROBLEM SELECTOR PLAN 1
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  - not a candidate for chemo per onc, discussed with patient and family  - DNR/DNI, pending hospice evaluation  - c/w IV pain meds PRN, initiate bowel regimen Gastric adenomacarcinoma with CT findings of liver mets and peritoneal carcinomatosis and MRI showing possible brain mets.  - not a candidate for chemo per onc, discussed with patient and family  - DNR/DNI, pending hospice evaluation  - c/w IV pain meds PRN, bowel regimen

## 2019-03-25 NOTE — PROGRESS NOTE ADULT - PROBLEM SELECTOR PLAN 5
CTH: Left frontal age-indeterminate infarct. Gyriform hyperintensity possibly petechial hemorrhage. MRH: Multiple acute lacunar type infarcts. Findings suggest embolic phenomenon.  - discussed risks benefits of antiplatelet therapy with patient and HCP daughter at bedside; will monitor off any AC per family's decision  - c/w atorva 20 CTH: Left frontal age-indeterminate infarct. Gyriform hyperintensity possibly petechial hemorrhage. MRH: Multiple acute lacunar type infarcts. Findings suggest embolic phenomenon.  - discussed risks benefits of antiplatelet therapy with patient and HCP daughter at bedside; will monitor off any AC per family's decision

## 2019-03-25 NOTE — PROGRESS NOTE ADULT - PROBLEM SELECTOR PLAN 7
Extensive b/l PE likely 2/2 malignancy and LE DVT  - s/p IVC filter  - monitor O2 sat, currently on 2L NC  - Discussed risks benefits of AC with family; will monitor off AC per family's wishes Extensive b/l PE likely 2/2 malignancy and LE DVT  - s/p IVC filter  - monitor O2 sat, currently sating well on RA  - Discussed risks benefits of AC with family; will monitor off AC per family's wishes

## 2019-03-25 NOTE — PROGRESS NOTE ADULT - PROBLEM SELECTOR PLAN 4
CT showed cirrhotic liver with large ascites  - s/p para on 3/21 with removal of 5.7 L, SAAG 0.4 likely malignant ascites and/or KIM  - ascites fluid negative for SBP, f/u pleural Cx NTD  - c/w prophylactic CTX 3/18-3/24  - thrombocytopenia likely 2/2 cirrhosis CT showed cirrhotic liver with large ascites  - s/p para on 3/21 with removal of 5.7 L, SAAG 0.4 likely malignant ascites and/or KIM  - ascites fluid negative for SBP, f/u pleural Cx NTD  - completed prophylactic CTX 3/18-3/24  - thrombocytopenia likely 2/2 cirrhosis

## 2019-03-25 NOTE — PROGRESS NOTE ADULT - PROBLEM SELECTOR PLAN 2
Likely 2/2 ulcerated gastric tumor. Normal esophagus and duodenum on EGD  - No signs of active bleeding  - Monitor CBC intermittently, transfuse for Hgb <7  - appreciate GI recs Likely 2/2 ulcerated gastric tumor. Normal esophagus and duodenum on EGD  - No signs of active bleeding  - Monitor CBC intermittently, transfuse for Hgb <7  - GI recs appreciated

## 2019-03-25 NOTE — PROGRESS NOTE ADULT - SUBJECTIVE AND OBJECTIVE BOX
Alejandro Alicia, PGY1  Pager [phone]    INCOMPLETE NOTE - IN PROGRESS    Patient is a 73y old  Female who presents with a chief complaint of hematemesis (24 Mar 2019 06:46)      SUBJECTIVE/INTERVAL EVENTS: Patient seen and examined at bedside. SAM o/n.    MEDICATIONS  (STANDING):  dextrose 5% + sodium chloride 0.9%. 1000 milliLiter(s) (75 mL/Hr) IV Continuous <Continuous>  pantoprazole  Injectable 40 milliGRAM(s) IV Push two times a day  polyethylene glycol 3350 17 Gram(s) Oral daily    MEDICATIONS  (PRN):  HYDROmorphone  Injectable 0.25 milliGRAM(s) IV Push every 4 hours PRN mild to moderate pain (1-6)  HYDROmorphone  Injectable 0.5 milliGRAM(s) IV Push every 4 hours PRN Severe Pain (7 - 10)      VITAL SIGNS:  T(F): 97.4 (03-25-19 @ 05:09), Max: 97.9 (03-24-19 @ 21:41)  HR: 82 (03-25-19 @ 05:09) (82 - 90)  BP: 134/81 (03-25-19 @ 05:09) (122/75 - 143/82)  RR: 18 (03-25-19 @ 05:09) (17 - 18)  SpO2: 92% (03-25-19 @ 05:09) (90% - 95%)    I&O's Summary    23 Mar 2019 07:01  -  24 Mar 2019 07:00  --------------------------------------------------------  IN: 260 mL / OUT: 120 mL / NET: 140 mL    24 Mar 2019 07:01  -  25 Mar 2019 06:26  --------------------------------------------------------  IN: 0 mL / OUT: 300 mL / NET: -300 mL      Daily     Daily     PHYSICAL EXAM:  Gen: Alert, well-developed, NAD  HEENT: NCAT, PERRL, EOMI, conjunctiva clear, sclera anicteric, no erythema or exudates in the oropharynx, mmm  Neck: Supple, no JVD  CV: RRR, S1S2, no m/r/g  Resp: CTAB, normal respiratory effort  Abd: Soft, nontender, nondistended, normal bowel sounds  Ext: + peripheral pulses, no edema, clubbing, or cyanosis  Neuro: AOx3, no focal deficits  SKIN: No rashes or lesions    LABS:    Hgb Trend: 10.4<--, 9.8<--, 8.6<--, 9.8<--, 10.1<--  03-23    138  |  107  |  26<H>  ----------------------------<  131<H>  4.2   |  18<L>  |  0.85    Ca    7.6<L>      23 Mar 2019 07:40  Phos  2.5     03-23  Mg     2.0     03-23    TPro  4.3<L>  /  Alb  2.4<L>  /  TBili  0.5  /  DBili  x   /  AST  38  /  ALT  58<H>  /  AlkPhos  130<H>  03-23    Creatinine Trend: 0.85<--, 1.02<--, 1.32<--, 1.72<--, 2.12<--, 2.21<--  LIVER FUNCTIONS - ( 23 Mar 2019 07:40 )  Alb: 2.4 g/dL / Pro: 4.3 g/dL / ALK PHOS: 130 U/L / ALT: 58 U/L / AST: 38 U/L / GGT: x                     CAPILLARY BLOOD GLUCOSE          RADIOLOGY & ADDITIONAL TESTS: Reviewed    Imaging Personally Reviewed:    Consultant(s) Notes Reviewed:      Care Discussed with Consultants/Other Providers: Alejandro Alicia, PGY1  Pager [phone]      Patient is a 73y old  Female who presents with a chief complaint of hematemesis (24 Mar 2019 06:46)      SUBJECTIVE/INTERVAL EVENTS: Patient seen and examined at bedside. SAM o/n. Had mild abd pain and and headache earlier this AM that improved with prn pain meds. Denies any symptoms this AM. No BM yesterday.    MEDICATIONS  (STANDING):  dextrose 5% + sodium chloride 0.9%. 1000 milliLiter(s) (75 mL/Hr) IV Continuous <Continuous>  pantoprazole  Injectable 40 milliGRAM(s) IV Push two times a day  polyethylene glycol 3350 17 Gram(s) Oral daily    MEDICATIONS  (PRN):  HYDROmorphone  Injectable 0.25 milliGRAM(s) IV Push every 4 hours PRN mild to moderate pain (1-6)  HYDROmorphone  Injectable 0.5 milliGRAM(s) IV Push every 4 hours PRN Severe Pain (7 - 10)      VITAL SIGNS:  T(F): 97.4 (03-25-19 @ 05:09), Max: 97.9 (03-24-19 @ 21:41)  HR: 82 (03-25-19 @ 05:09) (82 - 90)  BP: 134/81 (03-25-19 @ 05:09) (122/75 - 143/82)  RR: 18 (03-25-19 @ 05:09) (17 - 18)  SpO2: 92% (03-25-19 @ 05:09) (90% - 95%)    I&O's Summary    23 Mar 2019 07:01  -  24 Mar 2019 07:00  --------------------------------------------------------  IN: 260 mL / OUT: 120 mL / NET: 140 mL    24 Mar 2019 07:01  -  25 Mar 2019 06:26  --------------------------------------------------------  IN: 0 mL / OUT: 300 mL / NET: -300 mL      Daily     Daily     PHYSICAL EXAM:  Gen: Lethargic, arousable, NAD  HEENT: NCAT, EOMI, conjunctiva clear, sclera anicteric  Neck: Supple, no JVD  CV: RRR, S1S2, no m/r/g  Resp: bibasilar rales, normal respiratory effort  Abd: Soft, nontender, nondistended, normal bowel sounds  Ext: + peripheral pulses, no edema, clubbing, or cyanosis  Neuro: AOx1, largely aphasic with intermittent short speech, gestures yes and no, follows basic commands  SKIN: Improving forearm ecchymoses    LABS:    Hgb Trend: 10.4<--, 9.8<--, 8.6<--, 9.8<--, 10.1<--  03-23    138  |  107  |  26<H>  ----------------------------<  131<H>  4.2   |  18<L>  |  0.85    Ca    7.6<L>      23 Mar 2019 07:40  Phos  2.5     03-23  Mg     2.0     03-23    TPro  4.3<L>  /  Alb  2.4<L>  /  TBili  0.5  /  DBili  x   /  AST  38  /  ALT  58<H>  /  AlkPhos  130<H>  03-23    Creatinine Trend: 0.85<--, 1.02<--, 1.32<--, 1.72<--, 2.12<--, 2.21<--  LIVER FUNCTIONS - ( 23 Mar 2019 07:40 )  Alb: 2.4 g/dL / Pro: 4.3 g/dL / ALK PHOS: 130 U/L / ALT: 58 U/L / AST: 38 U/L / GGT: x                     CAPILLARY BLOOD GLUCOSE          RADIOLOGY & ADDITIONAL TESTS: Reviewed    Imaging Personally Reviewed:    Consultant(s) Notes Reviewed:      Care Discussed with Consultants/Other Providers:

## 2019-03-25 NOTE — GOALS OF CARE CONVERSATION - PERSONAL ADVANCE DIRECTIVE - CONVERSATION DETAILS
Patient seen and evaluated, pending approval for services, unclear discharge plan as daughter expressed concerns regarding ability to care for patient at home.  CM aware and will follow up with [facility] Liaison and daughter regarding plan tomorrow.

## 2019-03-26 ENCOUNTER — TRANSCRIPTION ENCOUNTER (OUTPATIENT)
Age: 74
End: 2019-03-26

## 2019-03-26 VITALS
DIASTOLIC BLOOD PRESSURE: 85 MMHG | SYSTOLIC BLOOD PRESSURE: 126 MMHG | RESPIRATION RATE: 18 BRPM | TEMPERATURE: 97 F | OXYGEN SATURATION: 90 % | HEART RATE: 93 BPM

## 2019-03-26 DIAGNOSIS — F41.9 ANXIETY DISORDER, UNSPECIFIED: ICD-10-CM

## 2019-03-26 DIAGNOSIS — R06.4 HYPERVENTILATION: ICD-10-CM

## 2019-03-26 DIAGNOSIS — R23.0 CYANOSIS: ICD-10-CM

## 2019-03-26 DIAGNOSIS — Z71.89 OTHER SPECIFIED COUNSELING: ICD-10-CM

## 2019-03-26 LAB
CULTURE RESULTS: SIGNIFICANT CHANGE UP
NON-GYNECOLOGICAL CYTOLOGY STUDY: SIGNIFICANT CHANGE UP
SPECIMEN SOURCE: SIGNIFICANT CHANGE UP

## 2019-03-26 PROCEDURE — 87102 FUNGUS ISOLATION CULTURE: CPT

## 2019-03-26 PROCEDURE — 82378 CARCINOEMBRYONIC ANTIGEN: CPT

## 2019-03-26 PROCEDURE — 82550 ASSAY OF CK (CPK): CPT

## 2019-03-26 PROCEDURE — 82330 ASSAY OF CALCIUM: CPT

## 2019-03-26 PROCEDURE — P9047: CPT

## 2019-03-26 PROCEDURE — 87086 URINE CULTURE/COLONY COUNT: CPT

## 2019-03-26 PROCEDURE — 87116 MYCOBACTERIA CULTURE: CPT

## 2019-03-26 PROCEDURE — 88341 IMHCHEM/IMCYTCHM EA ADD ANTB: CPT

## 2019-03-26 PROCEDURE — 76700 US EXAM ABDOM COMPLETE: CPT

## 2019-03-26 PROCEDURE — 83615 LACTATE (LD) (LDH) ENZYME: CPT

## 2019-03-26 PROCEDURE — 83036 HEMOGLOBIN GLYCOSYLATED A1C: CPT

## 2019-03-26 PROCEDURE — 82962 GLUCOSE BLOOD TEST: CPT

## 2019-03-26 PROCEDURE — 86901 BLOOD TYPING SEROLOGIC RH(D): CPT

## 2019-03-26 PROCEDURE — 86850 RBC ANTIBODY SCREEN: CPT

## 2019-03-26 PROCEDURE — 84300 ASSAY OF URINE SODIUM: CPT

## 2019-03-26 PROCEDURE — 83605 ASSAY OF LACTIC ACID: CPT

## 2019-03-26 PROCEDURE — 87449 NOS EACH ORGANISM AG IA: CPT

## 2019-03-26 PROCEDURE — 82803 BLOOD GASES ANY COMBINATION: CPT

## 2019-03-26 PROCEDURE — 84540 ASSAY OF URINE/UREA-N: CPT

## 2019-03-26 PROCEDURE — 84132 ASSAY OF SERUM POTASSIUM: CPT

## 2019-03-26 PROCEDURE — 85730 THROMBOPLASTIN TIME PARTIAL: CPT

## 2019-03-26 PROCEDURE — 94003 VENT MGMT INPAT SUBQ DAY: CPT

## 2019-03-26 PROCEDURE — 83930 ASSAY OF BLOOD OSMOLALITY: CPT

## 2019-03-26 PROCEDURE — 86304 IMMUNOASSAY TUMOR CA 125: CPT

## 2019-03-26 PROCEDURE — 49083 ABD PARACENTESIS W/IMAGING: CPT

## 2019-03-26 PROCEDURE — 82947 ASSAY GLUCOSE BLOOD QUANT: CPT

## 2019-03-26 PROCEDURE — C8929: CPT

## 2019-03-26 PROCEDURE — 83880 ASSAY OF NATRIURETIC PEPTIDE: CPT

## 2019-03-26 PROCEDURE — 83690 ASSAY OF LIPASE: CPT

## 2019-03-26 PROCEDURE — 80053 COMPREHEN METABOLIC PANEL: CPT

## 2019-03-26 PROCEDURE — 84295 ASSAY OF SERUM SODIUM: CPT

## 2019-03-26 PROCEDURE — 81001 URINALYSIS AUTO W/SCOPE: CPT

## 2019-03-26 PROCEDURE — 71045 X-RAY EXAM CHEST 1 VIEW: CPT

## 2019-03-26 PROCEDURE — 82140 ASSAY OF AMMONIA: CPT

## 2019-03-26 PROCEDURE — P9045: CPT

## 2019-03-26 PROCEDURE — 84100 ASSAY OF PHOSPHORUS: CPT

## 2019-03-26 PROCEDURE — 97161 PT EVAL LOW COMPLEX 20 MIN: CPT

## 2019-03-26 PROCEDURE — 93005 ELECTROCARDIOGRAM TRACING: CPT

## 2019-03-26 PROCEDURE — 85027 COMPLETE CBC AUTOMATED: CPT

## 2019-03-26 PROCEDURE — 86301 IMMUNOASSAY TUMOR CA 19-9: CPT

## 2019-03-26 PROCEDURE — P9016: CPT

## 2019-03-26 PROCEDURE — 85384 FIBRINOGEN ACTIVITY: CPT

## 2019-03-26 PROCEDURE — 86900 BLOOD TYPING SEROLOGIC ABO: CPT

## 2019-03-26 PROCEDURE — 88305 TISSUE EXAM BY PATHOLOGIST: CPT

## 2019-03-26 PROCEDURE — 82570 ASSAY OF URINE CREATININE: CPT

## 2019-03-26 PROCEDURE — 36430 TRANSFUSION BLD/BLD COMPNT: CPT

## 2019-03-26 PROCEDURE — 99233 SBSQ HOSP IP/OBS HIGH 50: CPT

## 2019-03-26 PROCEDURE — 96374 THER/PROPH/DIAG INJ IV PUSH: CPT | Mod: XU

## 2019-03-26 PROCEDURE — 82945 GLUCOSE OTHER FLUID: CPT

## 2019-03-26 PROCEDURE — 82435 ASSAY OF BLOOD CHLORIDE: CPT

## 2019-03-26 PROCEDURE — 84146 ASSAY OF PROLACTIN: CPT

## 2019-03-26 PROCEDURE — 87205 SMEAR GRAM STAIN: CPT

## 2019-03-26 PROCEDURE — 87206 SMEAR FLUORESCENT/ACID STAI: CPT

## 2019-03-26 PROCEDURE — 99291 CRITICAL CARE FIRST HOUR: CPT | Mod: 25

## 2019-03-26 PROCEDURE — 87075 CULTR BACTERIA EXCEPT BLOOD: CPT

## 2019-03-26 PROCEDURE — 85610 PROTHROMBIN TIME: CPT

## 2019-03-26 PROCEDURE — 82042 OTHER SOURCE ALBUMIN QUAN EA: CPT

## 2019-03-26 PROCEDURE — 94002 VENT MGMT INPAT INIT DAY: CPT

## 2019-03-26 PROCEDURE — 31500 INSERT EMERGENCY AIRWAY: CPT

## 2019-03-26 PROCEDURE — 87070 CULTURE OTHR SPECIMN AEROBIC: CPT

## 2019-03-26 PROCEDURE — 80061 LIPID PANEL: CPT

## 2019-03-26 PROCEDURE — 83935 ASSAY OF URINE OSMOLALITY: CPT

## 2019-03-26 PROCEDURE — C1729: CPT

## 2019-03-26 PROCEDURE — 85014 HEMATOCRIT: CPT

## 2019-03-26 PROCEDURE — C1880: CPT

## 2019-03-26 PROCEDURE — 87631 RESP VIRUS 3-5 TARGETS: CPT

## 2019-03-26 PROCEDURE — 99233 SBSQ HOSP IP/OBS HIGH 50: CPT | Mod: GC

## 2019-03-26 PROCEDURE — 88342 IMHCHEM/IMCYTCHM 1ST ANTB: CPT

## 2019-03-26 PROCEDURE — 71275 CT ANGIOGRAPHY CHEST: CPT

## 2019-03-26 PROCEDURE — 70450 CT HEAD/BRAIN W/O DYE: CPT

## 2019-03-26 PROCEDURE — 82565 ASSAY OF CREATININE: CPT

## 2019-03-26 PROCEDURE — 87040 BLOOD CULTURE FOR BACTERIA: CPT

## 2019-03-26 PROCEDURE — 87015 SPECIMEN INFECT AGNT CONCNTJ: CPT

## 2019-03-26 PROCEDURE — 96375 TX/PRO/DX INJ NEW DRUG ADDON: CPT | Mod: XU

## 2019-03-26 PROCEDURE — C1887: CPT

## 2019-03-26 PROCEDURE — 43753 TX GASTRO INTUB W/ASP: CPT

## 2019-03-26 PROCEDURE — 89051 BODY FLUID CELL COUNT: CPT

## 2019-03-26 PROCEDURE — 84484 ASSAY OF TROPONIN QUANT: CPT

## 2019-03-26 PROCEDURE — C1769: CPT

## 2019-03-26 PROCEDURE — 85379 FIBRIN DEGRADATION QUANT: CPT

## 2019-03-26 PROCEDURE — 93970 EXTREMITY STUDY: CPT

## 2019-03-26 PROCEDURE — 88360 TUMOR IMMUNOHISTOCHEM/MANUAL: CPT

## 2019-03-26 PROCEDURE — 84157 ASSAY OF PROTEIN OTHER: CPT

## 2019-03-26 PROCEDURE — C1894: CPT

## 2019-03-26 PROCEDURE — 82010 KETONE BODYS QUAN: CPT

## 2019-03-26 PROCEDURE — 74177 CT ABD & PELVIS W/CONTRAST: CPT

## 2019-03-26 PROCEDURE — 70551 MRI BRAIN STEM W/O DYE: CPT

## 2019-03-26 PROCEDURE — 88112 CYTOPATH CELL ENHANCE TECH: CPT

## 2019-03-26 PROCEDURE — 37191 INS ENDOVAS VENA CAVA FILTR: CPT

## 2019-03-26 PROCEDURE — 97166 OT EVAL MOD COMPLEX 45 MIN: CPT

## 2019-03-26 PROCEDURE — 82105 ALPHA-FETOPROTEIN SERUM: CPT

## 2019-03-26 PROCEDURE — 97530 THERAPEUTIC ACTIVITIES: CPT

## 2019-03-26 PROCEDURE — 83735 ASSAY OF MAGNESIUM: CPT

## 2019-03-26 RX ORDER — HYDROMORPHONE HYDROCHLORIDE 2 MG/ML
0.5 INJECTION INTRAMUSCULAR; INTRAVENOUS; SUBCUTANEOUS
Qty: 0 | Refills: 0 | Status: DISCONTINUED | OUTPATIENT
Start: 2019-03-26 | End: 2019-03-26

## 2019-03-26 RX ORDER — HYDROMORPHONE HYDROCHLORIDE 2 MG/ML
0.5 INJECTION INTRAMUSCULAR; INTRAVENOUS; SUBCUTANEOUS EVERY 4 HOURS
Qty: 0 | Refills: 0 | Status: DISCONTINUED | OUTPATIENT
Start: 2019-03-26 | End: 2019-03-26

## 2019-03-26 RX ORDER — ATORVASTATIN CALCIUM 80 MG/1
1 TABLET, FILM COATED ORAL
Qty: 0 | Refills: 0 | COMMUNITY

## 2019-03-26 RX ORDER — DICLOFENAC SODIUM 30 MG/G
1 GEL TOPICAL
Qty: 0 | Refills: 0 | COMMUNITY

## 2019-03-26 RX ORDER — ALENDRONATE SODIUM 70 MG/1
1 TABLET ORAL
Qty: 0 | Refills: 0 | COMMUNITY

## 2019-03-26 RX ORDER — NORTRIPTYLINE HYDROCHLORIDE 10 MG/1
1 CAPSULE ORAL
Qty: 0 | Refills: 0 | COMMUNITY

## 2019-03-26 RX ORDER — HYDROMORPHONE HYDROCHLORIDE 2 MG/ML
0.25 INJECTION INTRAMUSCULAR; INTRAVENOUS; SUBCUTANEOUS EVERY 4 HOURS
Qty: 0 | Refills: 0 | Status: DISCONTINUED | OUTPATIENT
Start: 2019-03-26 | End: 2019-03-26

## 2019-03-26 RX ORDER — FELODIPINE 5 MG/1
1 TABLET, FILM COATED, EXTENDED RELEASE ORAL
Qty: 0 | Refills: 0 | COMMUNITY

## 2019-03-26 RX ORDER — ACETAMINOPHEN 500 MG
1 TABLET ORAL
Qty: 0 | Refills: 0 | COMMUNITY

## 2019-03-26 RX ORDER — ROBINUL 0.2 MG/ML
0.4 INJECTION INTRAMUSCULAR; INTRAVENOUS EVERY 6 HOURS
Qty: 0 | Refills: 0 | Status: DISCONTINUED | OUTPATIENT
Start: 2019-03-26 | End: 2019-03-26

## 2019-03-26 RX ADMIN — Medication 0.5 MILLIGRAM(S): at 12:16

## 2019-03-26 RX ADMIN — HYDROMORPHONE HYDROCHLORIDE 0.5 MILLIGRAM(S): 2 INJECTION INTRAMUSCULAR; INTRAVENOUS; SUBCUTANEOUS at 13:37

## 2019-03-26 RX ADMIN — HYDROMORPHONE HYDROCHLORIDE 0.25 MILLIGRAM(S): 2 INJECTION INTRAMUSCULAR; INTRAVENOUS; SUBCUTANEOUS at 11:27

## 2019-03-26 RX ADMIN — HYDROMORPHONE HYDROCHLORIDE 0.5 MILLIGRAM(S): 2 INJECTION INTRAMUSCULAR; INTRAVENOUS; SUBCUTANEOUS at 09:20

## 2019-03-26 RX ADMIN — HYDROMORPHONE HYDROCHLORIDE 0.5 MILLIGRAM(S): 2 INJECTION INTRAMUSCULAR; INTRAVENOUS; SUBCUTANEOUS at 08:52

## 2019-03-26 RX ADMIN — HYDROMORPHONE HYDROCHLORIDE 0.25 MILLIGRAM(S): 2 INJECTION INTRAMUSCULAR; INTRAVENOUS; SUBCUTANEOUS at 12:00

## 2019-03-26 NOTE — DISCHARGE NOTE FOR THE EXPIRED PATIENT - HOSPITAL COURSE
73 F w/ pmh former smoker HTN, HLD, RA (colchicine), peripheral neuropathy who presented with FTT. Upon presentation, pt was diagnosed with bilateral PEs and DVT; initially was started on heparin gtt but subsequently developed hematemesis requiring intubation for airway protection; extubated in the MICU uneventfully. AC was discontinued after discussion with pt's family. EGD showed ulcerated gastric masses with bleeding, which were biopsied and c/w gastric adenocarcinoma. Further imaging showed hepatic metastases. Pt developed ascites and underwent large volume paracentesis; fluid analysis was c/w peritoneal mets. After extubation, pt remained aphasic and mental status did not return to normal. Brain imaging was c/w possible intracranial metastases, with concern for petechiae. Pt was kept off anticoagulation after extensive discussions with family. At this point, given pt's poor prognosis 2/2 metastatic gastric adenocarcinoma and multiple active issues, pt was made a DNR/I and primarily comfort care. Palliative made a referral to hospice care network; initially pt's daughter preferred home hospice, but pt remained dependent on IV pain medications, and respiratory status continued to decompensate given her extensive PEs and inability to resume AC. Pt was referred to inpatient hospice, but prior to acceptance, pt began to exihibit agonal breathing.  On 3/26 at 1:42pm, called by RN to evaluate the patient for cessation of respirations.     On physical exam patient did not respond to verbal or physical stimuli. No spontaneous respirations.  Absent heart and breath sounds. Absent radial, femoral, and carotid pulses.   Pupils are fixed and dilated absent HANNAH, no corneal reflex.  EKG rhythm strip shows aystole.   Patient pronounced dead at 1:44pm. Attending notified.  Family at bedside. Autopsy declined.

## 2019-03-26 NOTE — PROGRESS NOTE ADULT - PROVIDER SPECIALTY LIST ADULT
Gastroenterology
Internal Medicine
Intervent Radiology
MICU
MICU
Neurology
Intervent Radiology
MICU
Internal Medicine

## 2019-03-26 NOTE — PROGRESS NOTE ADULT - PROBLEM SELECTOR PLAN 2
Likely 2/2 ulcerated gastric tumor. Normal esophagus and duodenum on EGD  - No signs of active bleeding  - Monitor CBC intermittently, transfuse for Hgb <7

## 2019-03-26 NOTE — CONSULT NOTE ADULT - SUBJECTIVE AND OBJECTIVE BOX
HPI:  73 F w/ pmh former smoker HTN, HLD, RA (colchicine), peripheral neuropathy who presents for decreased appetite, generalized weakness and confusion for 3 days. Patient is a clinic patient at [address]65 and follow up with Dr. Lema in [facility]. Patient had a colonoscopy in dec/2018. Patient also recently returned from [place] 2/18/19. Patient is denied any chest pain, abdominal pain, sob, fever.  Patient is AxO2, no complaints. She has a history of hepatitis A, B and C were negative.     In the ED she had hematemesis and was intubated for airway protection. Transferred to the MICU for further care and management. (16 Mar 2019 19:20)    PERTINENT PM/SXH:   Raynauds disease  GERD (gastroesophageal reflux disease)  HLD (hyperlipidemia)  HTN (hypertension)  COPD (chronic obstructive pulmonary disease)    S/P left cataract extraction  History of tonsillectomy  History of rhinoplasty  History of hernia repair    FAMILY HISTORY:  No pertinent family history in first degree relatives    ITEMS NOT CHECKED ARE NOT PRESENT    SOCIAL HISTORY:   Significant other/partner:  [x]  Children:  [x ]  Latter day/Spirituality:Baptism   Substance hx:  [ ]  former   Tobacco hx:  X[ ]   Alcohol hx: [ ]   Home Opioid hx:  [ ] I-Stop Reference No:  Living Situation: [x ]Home  [ ]Long term care  [ ]Rehab [ ]Other    ADVANCE DIRECTIVES:    DNR  Yes  MOLST  [ x]  Living Will  [ ]   DECISION MAKER(s):  [ ] Health Care Proxy(s)  [x ] Surrogate(s)  [ ] Guardian           Name(s): Phone Number(s):  Zakiya Jass   BASELINE (I)ADL(s) (prior to admission):  [place]: [ ]Total  [x ] Moderate [ ]Dependent    Allergies    No Known Allergies    Intolerances    MEDICATIONS  (STANDING):    MEDICATIONS  (PRN):  glycopyrrolate Injectable 0.4 milliGRAM(s) IV Push every 6 hours PRN secretions  HYDROmorphone  Injectable 0.5 milliGRAM(s) IV Push every 2 hours PRN dyspnea  HYDROmorphone  Injectable 0.5 milliGRAM(s) IV Push every 2 hours PRN mild mod severe pain  LORazepam   Injectable 0.5 milliGRAM(s) IV Push every 4 hours PRN Agitation    PRESENT SYMPTOMS: [ ]Unable to obtain due to poor mentation   Source if other than patient:  [ ]Family   [ ]Team     Pain (Impact on QOL):    Location -         Minimal acceptable level (0-10 scale):                    Aggrevating factors -  Quality -  Radiation -  Severity (0-10 scale) -    Timing -    PAIN AD Score:  5    [email or web address] (press ctrl +  left click to view)    Dyspnea:                           [ ]Mild [ ]Moderate [x ]Severe  Anxiety:                             [ ]Mild [ ]Moderate [ ]Severe  Fatigue:                             [ ]Mild [ ]Moderate [ x]Severe  Nausea:                             [ ]Mild [ ]Moderate [ ]Severe  Loss of appetite:              [ ]Mild [ ]Moderate [x ]Severe  Constipation:                    [ ]Mild [ ]Moderate [ ]Severe    Other Symptoms:  [ ]All other review of systems negative     Karnofsky Performance Score/Palliative Performance Status Version 2:    10     %  PHYSICAL EXAM:  Vital Signs Last 24 Hrs  T(C): 36.3 (26 Mar 2019 05:47), Max: 36.7 (25 Mar 2019 14:00)  T(F): 97.4 (26 Mar 2019 05:47), Max: 98.1 (25 Mar 2019 14:00)  HR: 93 (26 Mar 2019 05:47) (82 - 93)  BP: 126/85 (26 Mar 2019 05:47) (124/82 - 132/83)  BP(mean): --  RR: 18 (26 Mar 2019 05:47) (18 - 20)  SpO2: 90% (26 Mar 2019 05:47) (90% - 96%) I&O's Summary    25 Mar 2019 07:01  -  26 Mar 2019 07:00  --------------------------------------------------------  IN: 240 mL / OUT: 400 mL / NET: -160 mL    GENERAL:  [ ]Alert  [ ]Oriented x   [ ]Lethargic  [ ]Cachexia  [x ]Unarousable  [ ]Verbal  [x ]Non-Verbal  Behavioral:   [ ] Anxiety  [ ] Delirium [ ] Agitation [x ] Other  TERMINAL AGITATION   HEENT:  [ ]Normal   [ ]XDry mouth   [ ]ET Tube/Trach  [ ]Oral lesions  PULMONARY:   [ ]Clear [ X]Tachypnea  [ ]Audible excessive secretions   [ ]Rhonchi        [ ]Right [ ]Left [ ]Bilateral  [ ]Crackles        [ ]Right [ ]Left [ ]Bilateral  [ ]Wheezing     [ ]Right [ ]Left [ ]Bilateral  CARDIOVASCULAR:    [ ]Regular [ ]Irregular [X ]Tachy  [ ]Allan [ ]Murmur [ ]Other  GASTROINTESTINAL:  X[ ]Soft  [ ]Distended   [ X]+BS  [ ]Non tender [ ]Tender  [ ]PEG [ ]OGT/ NGT  Last BM:   GENITOURINARY:  [ ]Normal [X ] Incontinent   [ ]Oliguria/Anuria   [ ]Westbrook  MUSCULOSKELETAL:   [ ]Normal   [ ]Weakness  [ ]XBed/Wheelchair bound [ ]Edema  NEUROLOGIC:   [ ]No focal deficits  [ X] Cognitive impairment  [ ] Dysphagia [ ]Dysarthria [ ] Paresis [ ]Other   SKIN:   [X ]Normal   [ ]Pressure ulcer(s)  [ ]Rash    CRITICAL CARE:  [ ] Shock Present  [ ]Septic [ ]Cardiogenic [ ]Neurologic [ ]Hypovolemic  [ ]  Vasopressors [ ]  Inotropes   [X ] Respiratory failure present  X ] Acute  [ ] Chronic [ ] Hypoxic  [ ] Hypercarbic [ ] Other  [ ] Other organ failure     LABS:            RADIOLOGY & ADDITIONAL STUDIES:    INTERPRETATION:  CLINICAL INFORMATION: Gastrointestinal malignancy.    Gastric cancer.    COMPARISON: CT scan of the chest 3/16/2019    PROCEDURE:   CT of the Abdomen and Pelvis was performed with intravenous contrast.   Intravenous contrast: 90 ml Omnipaque 350. 10 ml discarded.  Oral contrast: None.  Sagittal and coronal reformats were performed.    FINDINGS:    LOWER CHEST: Persistent pulmonary embolus is identified at both lung   bases. There are small bilateral pleural effusions. Bilateral calcified   granuloma.    LIVER: Numerous liver lesions are present, consistent with hepatic   metastatic disease the largest measuring 3.3 x 2.2 cm in the left hepatic   lobe (3:44)  BILE DUCTS: Normal caliber.  GALLBLADDER: Prominent distention of the gallbladder which demonstrates   layering sludge.  SPLEEN: Small perfusion defects in the spleen, likely splenic infarcts.  PANCREAS: Hypoenhancing mass is present in the tail of the pancreas   measuring 2.9 x 2.2 cm (3:58), which may be primary or secondary in   etiology.  ADRENALS: Within normal limits.  KIDNEYS/URETERS: Bilateral renal lesions are too small to characterize.   No hydronephrosis.    BLADDER: Decompressed with a Westbrook catheter.  REPRODUCTIVE ORGANS: Uterus and bilateral adnexa are unremarkable.    BOWEL: No bowel obstruction. Appendix is not visualized.  PERITONEUM: There is a large amount of abdominal and pelvic ascites.   Omental and mesenteric nodularity is present, consistent with changes of   peritoneal carcinomatosis. Thickening and nodularity of the peritoneal   surfaces is present, most prominent in the pelvis.    VESSELS:  Atherosclerotic calcifications. An IVC filter is noted.   Nonocclusive clot is present in the peripheral portion of the IVC.   Additional nonocclusive clot is present in the bilateral distal external   iliac veins, common femoral veins and proximal greater saphenous veins.  RETROPERITONEUM: No lymphadenopathy.    ABDOMINAL WALL: Anasarca.  BONES: Compression deformity in the T12 vertebral body. Old right   inferior pubic ramus fracture.    IMPRESSION:   *  Multifocal hepatic metastases.  *  Peritoneal carcinomatosis.  *  Large amount of abdominal and pelvic ascites.  *  Nonocclusive clot in the infrarenal IVC and bilateral lower   extremities.              PROTEIN CALORIE MALNUTRITION:   [ ] PPSV2 < or = to 30% [ ] significant weight loss  [ ] poor nutritional intake [ ] catabolic state [ ] anasarca     Albumin, Serum: 2.4 g/dL (03-23-19 @ 07:40)  Artificial Nutrition [ ]     REFERRALS:   [ ]Chaplaincy  [ ] Hospice  [ ]Child Life  [ ]Social Work  [ ]Case management [ ]Holistic Therapy   Goals of Care Discussion Document: Goals of Care Conversation - Personal Advance Directive [ALBERTA Villagran] (03-25-19 @ 16:29)

## 2019-03-26 NOTE — CONSULT NOTE ADULT - CONSULT REQUESTED DATE/TIME
16-Mar-2019 19:57
19-Mar-2019 16:28
20-Mar-2019 13:15
21-Mar-2019 14:23
26-Mar-2019 13:00
16-Mar-2019 17:07

## 2019-03-26 NOTE — PROGRESS NOTE ADULT - SUBJECTIVE AND OBJECTIVE BOX
CHIEF COMPLAINT: Patient is a 73y old  Female who presents with a chief complaint of Hematemesis (25 Mar 2019 06:26)      SUBJECTIVE / OVERNIGHT EVENTS: No acute events overnight. Pt seen and examined at bedside.      MEDICATIONS:  MEDICATIONS  (STANDING):  dextrose 5% + sodium chloride 0.9%. 1000 milliLiter(s) (75 mL/Hr) IV Continuous <Continuous>  pantoprazole    Tablet 40 milliGRAM(s) Oral two times a day  polyethylene glycol 3350 17 Gram(s) Oral two times a day    MEDICATIONS  (PRN):  HYDROmorphone  Injectable 0.25 milliGRAM(s) IV Push every 4 hours PRN mild to moderate pain (1-6)  HYDROmorphone  Injectable 0.5 milliGRAM(s) IV Push every 4 hours PRN Severe Pain (7 - 10)        OBJECTIVE:  Vital Signs Last 24 Hrs  T(C): 36.3 (26 Mar 2019 05:47), Max: 36.7 (25 Mar 2019 14:00)  T(F): 97.4 (26 Mar 2019 05:47), Max: 98.1 (25 Mar 2019 14:00)  HR: 93 (26 Mar 2019 05:47) (82 - 93)  BP: 126/85 (26 Mar 2019 05:47) (124/82 - 132/83)  BP(mean): --  RR: 18 (26 Mar 2019 05:47) (18 - 20)  SpO2: 90% (26 Mar 2019 05:47) (90% - 96%)  CAPILLARY BLOOD GLUCOSE        I&O's Summary    24 Mar 2019 07:01  -  25 Mar 2019 07:00  --------------------------------------------------------  IN: 0 mL / OUT: 300 mL / NET: -300 mL    25 Mar 2019 07:01  -  26 Mar 2019 06:30  --------------------------------------------------------  IN: 240 mL / OUT: 400 mL / NET: -160 mL          PHYSICAL EXAM:  Gen: Lethargic, arousable, NAD  HEENT: NCAT, EOMI, conjunctiva clear, sclera anicteric  Neck: Supple, no JVD  CV: RRR, S1S2, no m/r/g  Resp: bibasilar rales, normal respiratory effort  Abd: Soft, nontender, nondistended, normal bowel sounds  Ext: + peripheral pulses, no edema, clubbing, or cyanosis  Neuro: AOx1, largely aphasic with intermittent short speech, gestures yes and no, follows basic commands  SKIN: Improving forearm ecchymoses    LABS:  No AM labs CHIEF COMPLAINT: Patient is a 73y old  Female who presents with a chief complaint of Hematemesis (25 Mar 2019 06:26)      SUBJECTIVE / OVERNIGHT EVENTS: No acute events overnight. Pt seen and examined with son at bedside- son refuses  phone. States she is more or less the same as yesterday. Legs have been more cool since yesterday.       MEDICATIONS:  MEDICATIONS  (STANDING):  dextrose 5% + sodium chloride 0.9%. 1000 milliLiter(s) (75 mL/Hr) IV Continuous <Continuous>  pantoprazole    Tablet 40 milliGRAM(s) Oral two times a day  polyethylene glycol 3350 17 Gram(s) Oral two times a day    MEDICATIONS  (PRN):  HYDROmorphone  Injectable 0.25 milliGRAM(s) IV Push every 4 hours PRN mild to moderate pain (1-6)  HYDROmorphone  Injectable 0.5 milliGRAM(s) IV Push every 4 hours PRN Severe Pain (7 - 10)        OBJECTIVE:  Vital Signs Last 24 Hrs  T(C): 36.3 (26 Mar 2019 05:47), Max: 36.7 (25 Mar 2019 14:00)  T(F): 97.4 (26 Mar 2019 05:47), Max: 98.1 (25 Mar 2019 14:00)  HR: 93 (26 Mar 2019 05:47) (82 - 93)  BP: 126/85 (26 Mar 2019 05:47) (124/82 - 132/83)  BP(mean): --  RR: 18 (26 Mar 2019 05:47) (18 - 20)  SpO2: 90% (26 Mar 2019 05:47) (90% - 96%)  CAPILLARY BLOOD GLUCOSE        I&O's Summary    24 Mar 2019 07:01  -  25 Mar 2019 07:00  --------------------------------------------------------  IN: 0 mL / OUT: 300 mL / NET: -300 mL    25 Mar 2019 07:01  -  26 Mar 2019 06:30  --------------------------------------------------------  IN: 240 mL / OUT: 400 mL / NET: -160 mL          PHYSICAL EXAM:  Gen: Lethargic, arousable, NAD  HEENT: NCAT, EOMI, conjunctiva clear, sclera anicteric  Neck: Supple, no JVD  CV: RRR, S1S2, no m/r/g  Resp: CTABL  Abd: Soft, nontender, nondistended, normal bowel sounds  Ext: distal cyanosis in bilateral lower extremities, cool to touch in distal extremities x4, no pain, pulses palpable, nontender, sensation intact  Neuro: AOx1, largely aphasic with intermittent short speech, gestures yes and no, follows basic commands  SKIN: Improving forearm ecchymoses    LABS:  No AM labs

## 2019-03-26 NOTE — PROGRESS NOTE ADULT - PROBLEM SELECTOR PLAN 1
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  - not a candidate for chemo per onc, discussed with patient and family  - DNR/DNI  - c/w IV pain meds PRN, bowel regimen  - palliative following, referral made to [facility] Gastric adenomacarcinoma with CT findings of liver mets and peritoneal carcinomatosis and MRI showing possible brain mets.  - not a candidate for chemo per onc, discussed with patient and family  - DNR/DNI  - c/w IV pain meds PRN, bowel regimen  - palliative following, referral made to [facility] for inpatient hospice.

## 2019-03-26 NOTE — PROGRESS NOTE ADULT - PROBLEM SELECTOR PLAN 9
DVT ppx: hold AC in setting of recent GIB and thrombocytopenia  Dispo: pending inpatient hospice eval  Code: DNR/DNI; intermittent labs; focus is on comfort Monitor BP  - largely normotensive, hold anti-HTN

## 2019-03-26 NOTE — PROGRESS NOTE ADULT - PROBLEM SELECTOR PLAN 6
Has been largely aphasic since extubation. Normal speech at baseline.  - Likely 2/2 CVA  - Follows commands and appears to have understanding, has been more interactive with family making mostly one word statements to occasional short sentences CTH: Left frontal age-indeterminate infarct. Gyriform hyperintensity possibly petechial hemorrhage. MRH: Multiple acute lacunar type infarcts. Findings suggest embolic phenomenon.  - discussed risks benefits of antiplatelet therapy with patient and HCP daughter at bedside; will monitor off any AC per family's decision

## 2019-03-26 NOTE — CONSULT NOTE ADULT - ASSESSMENT
72 y/o female admitted with lethargy, weakness, poor po intake , CT noted for gastric adenocarcinoma, called for symptom management in the setting of active phases of dying

## 2019-03-26 NOTE — PROGRESS NOTE ADULT - PROBLEM SELECTOR PLAN 4
CT showed cirrhotic liver with large ascites  - s/p para on 3/21 with removal of 5.7 L, SAAG 0.4 likely malignant ascites and/or KIM  - ascites fluid negative for SBP, f/u pleural Cx NTD  - completed prophylactic CTX 3/18-3/24  - thrombocytopenia likely 2/2 cirrhosis In the setting of opioid use and decreased PO intake  - miralax daily; escalate bowel regimen as needed

## 2019-03-26 NOTE — PROGRESS NOTE ADULT - PROBLEM SELECTOR PLAN 3
In the setting of opioid use and decreased PO intake  - miralax daily; escalate bowel regimen as needed Now with cyanosis of all 4 extremities, cool to touch. No sensory loss, pulses palpable, nontender and nonpainful, doubt compartment syndrome. Exam not c/w PVD given acute onset, or arterial thromboembolic phenomenon as bilateral and symmetric (also would not be a candidate for thrombolysis/antiplatelets given GIB and ?intracranial mets). More likely 2/2 peripheral vasoconstriction 2/2 intravascular depletion from poor nutrition.

## 2019-03-26 NOTE — PROGRESS NOTE ADULT - PROBLEM SELECTOR PLAN 7
Extensive b/l PE likely 2/2 malignancy and LE DVT  - s/p IVC filter  - monitor O2 sat, currently sating well on RA  - Discussed risks benefits of AC with family; will monitor off AC per family's wishes given high risk of GIB from gastric adenocarcinoma and possible intracranial metastases Has been largely aphasic since extubation. Normal speech at baseline.  - Likely 2/2 CVA  - Follows commands and appears to have understanding, has been more interactive with family making mostly one word statements to occasional short sentences

## 2019-03-26 NOTE — PROGRESS NOTE ADULT - PROBLEM SELECTOR PLAN 8
Monitor BP  - largely normotensive, hold anti-HTN Extensive b/l PE likely 2/2 malignancy and LE DVT  - s/p IVC filter  - monitor O2 sat, currently sating well on RA  - Discussed risks benefits of AC with family; will monitor off AC per family's wishes given high risk of GIB from gastric adenocarcinoma and possible intracranial metastases

## 2019-03-26 NOTE — CONSULT NOTE ADULT - PROBLEM SELECTOR RECOMMENDATION 4
Spent >30 minutes with patient and daughter.  Patient is in the last moments/hours of her life.  Actively dying as evidenced by apnea , mottled extremities, unresponsive.  Discussed dying process with daughter at bedside.  Appropriately grieving .   MOLST completed by this writer along with lack of capacity document .

## 2019-03-26 NOTE — CHART NOTE - NSCHARTNOTEFT_GEN_A_CORE
On 3/26 at 1:42pm, called by RN to evaluate the patient for cessation of respirations.     On physical exam patient did not respond to verbal or physical stimuli. No spontaneous respirations.  Absent heart and breath sounds. Absent radial, femoral, and carotid pulses.   Pupils are fixed and dilated absent HANNAH, no corneal reflex.  EKG rhythm strip shows aystole.   Patient pronounced dead at 1:44pm. Attending notified.  Family at bedside. Autopsy declined.    Amarjit Whitney, PGY-1

## 2019-03-26 NOTE — PROGRESS NOTE ADULT - PROBLEM SELECTOR PLAN 5
CTH: Left frontal age-indeterminate infarct. Gyriform hyperintensity possibly petechial hemorrhage. MRH: Multiple acute lacunar type infarcts. Findings suggest embolic phenomenon.  - discussed risks benefits of antiplatelet therapy with patient and HCP daughter at bedside; will monitor off any AC per family's decision CT showed cirrhotic liver with large ascites  - s/p para on 3/21 with removal of 5.7 L, SAAG 0.4 likely malignant ascites and/or KIM  - ascites fluid negative for SBP, f/u pleural Cx NTD  - completed prophylactic CTX 3/18-3/24  - thrombocytopenia likely 2/2 cirrhosis

## 2019-03-26 NOTE — DISCHARGE NOTE PROVIDER - HOSPITAL COURSE
HPI:    73 F w/ pmh former smoker HTN, HLD, RA (colchicine), peripheral neuropathy who presented with FTT. Upon presentation, pt was diagnosed with bilateral PEs and DVT; initially was started on heparin gtt but subsequently developed hematemesis requiring intubation for airway protection; extubated in the MICU uneventfully. AC was discontinued after discussion with pt's family. EGD showed ulcerated gastric masses with bleeding, which were biopsied and c/w gastric adenocarcinoma. Further imaging showed hepatic metastases. Pt developed ascites and underwent large volume paracentesis; fluid analysis was c/w peritoneal mets. After extubation, pt remained aphasic and mental status did not return to normal. Brain imaging was c/w possible intracranial metastases, with concern for petechiae. Pt was kept off anticoagulation after extensive discussions with family. At this point, given pt's poor prognosis 2/2 metastatic gastric adenocarcinoma and multiple active issues, pt was made a DNR/I and primarily comfort care. Palliative made a referral to hospice care network; initially pt's daughter preferred home hospice, but pt remained dependent on IV pain medications, and respiratory status continued to decompensate given her extensive PEs and inability to resume AC. Pt was discharged to inpatient hospice. HPI:    73 F w/ pmh former smoker HTN, HLD, RA (colchicine), peripheral neuropathy who presented with FTT. Upon presentation, pt was diagnosed with bilateral PEs and DVT; initially was started on heparin gtt but subsequently developed hematemesis requiring intubation for airway protection; extubated in the MICU uneventfully. AC was discontinued after discussion with pt's family. EGD showed ulcerated gastric masses with bleeding, which were biopsied and c/w gastric adenocarcinoma. Further imaging showed hepatic metastases. Pt developed ascites and underwent large volume paracentesis; fluid analysis was c/w peritoneal mets. After extubation, pt remained aphasic and mental status did not return to normal. Brain imaging was c/w possible intracranial metastases, with concern for petechiae. Pt was kept off anticoagulation after extensive discussions with family. At this point, given pt's poor prognosis 2/2 metastatic gastric adenocarcinoma and multiple active issues, pt was made a DNR/I and primarily comfort care. Palliative made a referral to hospice care network; initially pt's daughter preferred home hospice, but pt remained dependent on IV pain medications, and respiratory status continued to decompensate given her extensive PEs and inability to resume AC.

## 2019-03-26 NOTE — PROGRESS NOTE ADULT - PROBLEM SELECTOR PLAN 10
DVT ppx: hold AC in setting of recent GIB and thrombocytopenia  Dispo: pending inpatient hospice eval  Code: DNR/DNI; intermittent labs; focus is on comfort

## 2019-03-26 NOTE — PROGRESS NOTE ADULT - ASSESSMENT
73y F HTN, HLD, COPD, GERD, RA presented with several days of decreased appetite, weakness, FTT found to have bilateral PE, was initially started on heparin gtt now s/p reversal and off AC due to hematemesis, s/p intubation and extubation, s/p IVC filter. 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 73y F HTN, HLD, COPD, GERD, RA presented with several days of decreased appetite, weakness, FTT found to have bilateral PE, was initially started on heparin gtt now s/p reversal and off AC due to hematemesis, s/p intubation and extubation, s/p IVC filter. Patient found to have ulcerated gastric adenocarcinoma with Multifocal hepatic metastases, Peritoneal carcinomatosis and ascites s/p therapeutic paracentesis, poss intracranial mets with concern for petechiae. Pending inpatient hospice.

## 2019-03-26 NOTE — PROGRESS NOTE ADULT - ATTENDING COMMENTS
Agree with above. Seen and examined with residents on rounds. Stable respiratory status off vent. Head CT final read shows possible petechial hemorrhage so will not be able to restart anticoagulation. Awaiting final results of GI biopsy. Supportive care. Family at the bedside and updated.  Total CC time 35 min.
Agree with above. Seen and examined with residents on rounds. Critically ill requiring frequent bedside visits. Weaning trials today with possible extubation. Off anticoagulation. Awaiting biopsy results from stomach.   Total CC time 35 min.
I have seen and examined the patient. I agree with the above history, physical exam, and plan of care except for as detailed below.    72 y/o F presenting with weakness and confusion found to have metastatic cancer of unknown primary and bilateral PE. CT with possible evidence of R heart strain. Troponin unimpressive. TTE without clear evidence of R heart strain, BNP pending. Patient was started on heparin gtt, however this led to hematemesis requiring intubation for airway protection. Endoscopy done showing large gastric mass, biopsies taken, no intervention done. Heparin gtt discontinued with resolution of bleeding. PENNIE.    - Daily SAT/SBT  - Vasopressors as needed for hypotension  - Check BNP  - Not a candidate for thrombolytics, and likely will not tolerate AC in near future so will proceed with IVC filter placement as patient still has significant lower extremity DVTs  present  - Follow up pathology results of EGD biopsies. Onc consult once path has resulted  - Trend Cr, avoid nephrotoxins  - Empiric broad spectrum abx    Attending critical care time 50 minutes
Pt stable at this time, interacting with family members  Plan for home hospice after the meeting tmrw    Leticia Garcia MD  Division of Hospital Medicine  Pager: [phone]  Office: [phone]
Pt with improvement in mental status, more responsive than yesterday  Plan for diagnostic/therapeutic para today by IR  Palliative care consulted  Pt remains DNR/DNI    Leticia Garcia MD  Division of Hospital Medicine  Pager: [phone]  Office: [phone]
More interactive this morning, able to have some conversation with the family  Plan for hospice meeting on Monday for safe dc planning    Leticia Garcia MD  Division of Hospital Medicine  Pager: [phone]  Office: [phone]
Pt  today. Please see expiration note.  Family was at bedside.  Hospice and Palliative Care support appreciated.
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 Extubation course further complicated by aphasia likely 2/2 multiple infarcts on MRI brain this morning. Pt still minimally responsive. Given concern for recurrent bleed from the erosive gastric mass and transition of ischemic to hemorrhagic infarcts, will hold off on systemic AC at this time. Appreciate heme/onc recs treatment options - already explained to the family that she is not a surgical candidate and the treatment would be more of palliative purpose. Will proceed with US abdomen for possible paracentesis if indicated for symptom relief. Await on further CT with IV contrast until kidney function recovers. Family understands the situation but needs some time to process. Will think about the code status with the other family members before making the final decision. They are agreeable to palliative care involvement as well. Emotional support provided to the family members. Face to face encounter time spent discussing goals of care 35 minutes    Leticia Garcia MD  Division of Hospital Medicine  Pager: [phone]  Office: [phone]
discussed with daughter, does not want antiplatelet or a/c after discussing r/b/a. has IVC filter.   Daughter wants home hospice and to focus on comfort, eval pending

## 2019-04-20 LAB
CULTURE RESULTS: SIGNIFICANT CHANGE UP
SPECIMEN SOURCE: SIGNIFICANT CHANGE UP

## 2019-05-11 LAB
CULTURE RESULTS: SIGNIFICANT CHANGE UP
SPECIMEN SOURCE: SIGNIFICANT CHANGE UP

## 2019-05-30 ENCOUNTER — APPOINTMENT (OUTPATIENT)
Dept: NEUROLOGY | Facility: HOSPITAL | Age: 74
End: 2019-05-30

## 2019-06-04 ENCOUNTER — APPOINTMENT (OUTPATIENT)
Dept: GASTROENTEROLOGY | Facility: HOSPITAL | Age: 74
End: 2019-06-04

## 2020-02-11 NOTE — PROGRESS NOTE ADULT - REASON FOR ADMISSION
Kenalog Preparation: Kenalog with 1% lidocaine with epinephrine Concentration Of Solution Injected (Mg/Ml): 8.0 Total Volume Injected (Ccs- Only Use Numbers And Decimals): 1 Include Z78.9 (Other Specified Conditions Influencing Health Status) As An Associated Diagnosis?: No Detail Level: Simple Consent: The risks of atrophy were reviewed with the patient. X Size Of Lesion In Cm (Optional): 0 hematemesis Medical Necessity Clause: This procedure was medically necessary because the lesions that were treated were: Hematemesis

## 2020-12-16 PROBLEM — Z12.31 ENCOUNTER FOR SCREENING MAMMOGRAM FOR BREAST CANCER: Status: RESOLVED | Noted: 2017-01-06 | Resolved: 2020-12-16

## 2022-06-11 NOTE — OCCUPATIONAL THERAPY INITIAL EVALUATION ADULT - RANGE OF MOTION EXAMINATION, UPPER EXTREMITY
Regarding: [place]  tooth pain  ----- Message from Virgen Tilley sent at 6/10/2022  8:20 PM CDT -----  Patient Name: Janine RICHARD Cheema    Full Name of Provider seen for current symptoms:Amber Purdy    Symptoms: tooth pain    Pregnant (If Yes, how long?) no    Call Back #:[phone]     Call Center Account # for provider seen for current symptoms:[id number]    Which State are you currently located in? (enter State name in Summary field): [place]    Patients needing callback from the RN are informed of the following:   Please be aware the return phone call may come from an unidentified phone number and also keep in mind that call back times vary based on call volumes.  If your condition becomes life threatening while you wait for a callback, you should seek immediate medical assistance by calling 911 or going to the Emergency Department for evaluation.       bilateral UE Active Assistive ROM was WNL (within normal limits)

## 2023-05-02 NOTE — ED ADULT TRIAGE NOTE - TEMPERATURE IN CELSIUS (DEGREES C)
36.2 Nasal Turnover Hinge Flap Text: The defect edges were debeveled with a #15 scalpel blade.  Given the size, depth, location of the defect and the defect being full thickness a nasal turnover hinge flap was deemed most appropriate. Using a sterile surgical marker, an appropriate hinge flap was drawn incorporating the defect. The area thus outlined was incised with a #15 scalpel blade. The flap was designed to recreate the nasal mucosal lining and the alar rim. The skin margins were undermined to an appropriate distance in all directions utilizing iris scissors. Following this, the designed flap was carried over into the primary defect and sutured into place

## 2023-05-04 NOTE — PROGRESS NOTE ADULT - PROBLEM SELECTOR PROBLEM 5
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 It is unknown if this medication is excreted in breast milk when applied topically.

## 2025-02-24 NOTE — ED PROVIDER NOTE - TEMPLATE, MLM
General FAMILY HISTORY:  Mother  Still living? Unknown  Family history of diabetes mellitus, Age at diagnosis: Age Unknown  Family history of hypertension, Age at diagnosis: Age Unknown